# Patient Record
Sex: MALE | Race: WHITE | Employment: FULL TIME | URBAN - METROPOLITAN AREA
[De-identification: names, ages, dates, MRNs, and addresses within clinical notes are randomized per-mention and may not be internally consistent; named-entity substitution may affect disease eponyms.]

---

## 2017-01-17 ENCOUNTER — OFFICE VISIT (OUTPATIENT)
Dept: INTERNAL MEDICINE | Facility: CLINIC | Age: 52
End: 2017-01-17
Payer: COMMERCIAL

## 2017-01-17 VITALS
SYSTOLIC BLOOD PRESSURE: 134 MMHG | BODY MASS INDEX: 35.29 KG/M2 | OXYGEN SATURATION: 95 % | TEMPERATURE: 98.1 F | HEIGHT: 73 IN | DIASTOLIC BLOOD PRESSURE: 100 MMHG | WEIGHT: 266.3 LBS | HEART RATE: 60 BPM

## 2017-01-17 DIAGNOSIS — G43.009 MIGRAINE WITHOUT AURA AND WITHOUT STATUS MIGRAINOSUS, NOT INTRACTABLE: Primary | ICD-10-CM

## 2017-01-17 DIAGNOSIS — N52.9 ERECTILE DYSFUNCTION, UNSPECIFIED ERECTILE DYSFUNCTION TYPE: ICD-10-CM

## 2017-01-17 PROCEDURE — 99213 OFFICE O/P EST LOW 20 MIN: CPT | Performed by: INTERNAL MEDICINE

## 2017-01-17 RX ORDER — ONDANSETRON 4 MG/1
4-8 TABLET, ORALLY DISINTEGRATING ORAL EVERY 8 HOURS PRN
Qty: 20 TABLET | Refills: 1 | Status: SHIPPED | OUTPATIENT
Start: 2017-01-17 | End: 2017-02-14

## 2017-01-17 RX ORDER — SILDENAFIL CITRATE 20 MG/1
20-40 TABLET ORAL DAILY PRN
Qty: 30 TABLET | Refills: 0 | Status: SHIPPED | OUTPATIENT
Start: 2017-01-17 | End: 2017-02-14

## 2017-01-17 RX ORDER — PREDNISONE 20 MG/1
40 TABLET ORAL 2 TIMES DAILY
Qty: 12 TABLET | Refills: 0 | Status: SHIPPED | OUTPATIENT
Start: 2017-01-17 | End: 2017-01-20

## 2017-01-17 RX ORDER — SUMATRIPTAN 100 MG/1
100 TABLET, FILM COATED ORAL
Qty: 9 TABLET | Refills: 2 | Status: SHIPPED
Start: 2017-01-17 | End: 2017-02-14

## 2017-01-17 NOTE — MR AVS SNAPSHOT
After Visit Summary   1/17/2017    Ricki Blanchard    MRN: [id number]           Patient Information     Date Of Birth          1965        Visit Information        Provider Department      1/17/2017 1:00 PM Ibrahima Campbell MD [facility]        Today's Diagnoses     Migraine without aura and without status migrainosus, not intractable    -  1     Erectile dysfunction, unspecified erectile dysfunction type           Care Instructions    *  Your headaches appear to be migraine headaches.        *  Take Imitrex 100mg at the onset of migraine specific headache, or even at the time of the aura that precedes the migraine if you have a specific aura that precedes your migraine headache.  You may take a second dose every after 3 hours if you do not get noticeable migraine relief up to a maximum dose of 200 mg per day.      *  Possible side effects of Imitrex include, but are not limited to: chest pain, palpitations, shortness of breath, chest tightness, anxiety, sweating, dizziness.   Stop the medication if you develop any concerning side effects.      *  Prednisone 60 mg once per day for 3 days.     *  ZOfran tablet, 4 mg every 6-8 hours as needed for nasuea.       *  Try to find a quiet, darker room to rest during the migraine headache.  Cover your eyes with a washcloth if needed.      *  You may experience nausea and/or vomiting during the migraines.  Try to remain as well hydrated as you can and eat what you are capable.     *  It is typical to have lingering migraine symptoms for a day or two after a migraine which may also include fatigue and difficulties in concentration.      *  There is also a possibility of another rebound migraine during the days following a migraine episode.  Use the migraine medication mentioned above.     *  If you have more than one or two migraines per month on a regular basis, please return to see me to discuss preventative migraine  medications.        Common Migraine triggers:    * sudden changes in the weather, excessive heat and humidity  * Some fragrances common to outdoor living, including sunscreens and insect repellents.  * Foods containing nitrites, such as hot dogs, sausages, luncheon meats  *  Certain cheeses such as Cheddar, Brie, Blue cheeses.    Some cheeses are OK such as cottage cheeses, American cheese, and cream cheese.  *  Caffeine and alcohol can be triggers, limit iced tea, coffee and cola drinks to 16 ounces per day and alcoholic drinks to 1-2 per day.  *  Avoid choclate and pizza.  *  Have no more than 1/2 cup per day of citrus, raisins, papayas or avocados      VIAGRA (SILDENAFIL):     *  Trial of Sildenafil (Viagra) for help with erections.       **DO NOT TAKE VIAGRA/SILDENAFIL WITHIN ONE WEEK OF A MIGRAINE HEADACHE    *  Brand name Viagra is Sildenafil that comes in a 25, 50, or 100 mg size tablet that is manufactured and marketed by the drug company [employer].    *  Sildenafil is also available as a generic medication that comes in a 20 or 40 mg tablet.  It is not technically called Viagra, but is the same medication.      *  Visit the Viagra web site (http://www.Artabase/) for more information and also for possible discount cards.      *  Viagra and generic Sildenafil are potentially available at much cheaper prices from some [facility], which can be very important if you are paying for the cost of this medication.  One example is www.Ringpay.Bizily    *  Depending on which version of Sildenafil you have, Take a 20 mg, 40 mg or 50 mg dose (1/2 of 100 mg tablet) 30-60 minutes before sexual intercourse.  If the first time lower dose does not work, then retry the same dose the next time.  If that dose dose not work, then try 100 mg dose.    The medication rarely works the very first time you try it since you are likely to be nervous about the medication itself, but there has to be a first time.      *  Beware  of possible side effects including dizziness, headaches, colosr vision, upset stomach, nausea, passing out, problems with urination and sustained erections.  Never take the medication with nitroglycerin containing medications.  If you experience any severe side effects stop the medication and do not take it again until you speak with our clinic.     *  There may be an increased rate of side effects from Sildenafil/Viagra when alcohol is used too close to viagra    *  I will prescribe either Sildenafil or Viagra as long as it helps improve erectile dysfunction and does not cause side effects.  Contact me after you finish the first prescription.  If it works well with no major side effects, then I will give you further prescriptions.  If Sildenafil does not work as desired, then we may consider another similar medication (Levitra or Cialis)    *  Contact me after you use the first prescripton and let me know if it works and that there are no side effects.   If it works and there are no side effects, then I will continue it.  Also let me know what form of Sildenafil you would like to continue and where you would like to get the prescriptions.                Follow-ups after your visit        Who to contact     If you have questions or need follow up information about today's clinic visit or your schedule please contact [facility] directly at [phone].  Normal or non-critical lab and imaging results will be communicated to you by MyChart, letter or phone within 4 business days after the clinic has received the results. If you do not hear from us within 7 days, please contact the clinic through [employer] or phone. If you have a critical or abnormal lab result, we will notify you by phone as soon as possible.  Submit refill requests through [employer] or call your pharmacy and they will forward the refill request to us. Please allow 3 business days for your refill to be completed.           "Additional Information About Your Visit        [employer] Information     Your.MD gives you secure access to your electronic health record. If you see a primary care provider, you can also send messages to your care team and make appointments. If you have questions, please call your primary care clinic.  If you do not have a primary care provider, please call [phone] and they will assist you.        Care EveryWhere ID     This is your Care EveryWhere ID. This could be used by other organizations to access your [place] medical records  [id number]        Your Vitals Were     Pulse Temperature Height BMI (Body Mass Index) Pulse Oximetry       60 98.1  F (36.7  C) (Oral) 6' 1\" (1.854 m) 35.14 kg/m2 95%        Blood Pressure from Last 3 Encounters:   01/17/17 134/100   10/12/16 158/102   02/01/16 132/94    Weight from Last 3 Encounters:   01/17/17 266 lb 4.8 oz (120.793 kg)   10/12/16 260 lb 9.6 oz (118.207 kg)   02/01/16 257 lb 3.2 oz (116.665 kg)              Today, you had the following     No orders found for display         Today's Medication Changes          These changes are accurate as of: 1/17/17  1:42 PM.  If you have any questions, ask your nurse or doctor.               Start taking these medicines.        Dose/Directions    ondansetron 4 MG ODT tab   Commonly known as:  ZOFRAN ODT   Used for:  Migraine without aura and without status migrainosus, not intractable   Started by:  Ibrahima Campbell MD        Dose:  4-8 mg   Take 1-2 tablets (4-8 mg) by mouth every 8 hours as needed for nausea   Quantity:  20 tablet   Refills:  1       predniSONE 20 MG tablet   Commonly known as:  DELTASONE   Used for:  Migraine without aura and without status migrainosus, not intractable   Started by:  Ibrahima Campbell MD        Dose:  40 mg   Take 2 tablets (40 mg) by mouth 2 times daily for 3 days   Quantity:  12 tablet   Refills:  0       sildenafil 20 MG tablet   Commonly known as:  REVATIO/VIAGRA   Used " for:  Erectile dysfunction, unspecified erectile dysfunction type   Started by:  Ibrahima Campbell MD        Dose:  20-40 mg   Take 1-2 tablets (20-40 mg) by mouth daily as needed Never use with nitroglycerin, terazosin or doxazosin.   Quantity:  30 tablet   Refills:  0       SUMAtriptan 100 MG tablet   Commonly known as:  IMITREX   Used for:  Migraine without aura and without status migrainosus, not intractable   Started by:  Ibrahima Campbell MD        Dose:  100 mg   Take 1 tablet (100 mg) by mouth at onset of headache for migraine May take one additional tablet after hours if needed; MAX dose 200 mg daily   Quantity:  9 tablet   Refills:  2         Stop taking these medicines if you haven't already. Please contact your care team if you have questions.     sildenafil 100 MG cap/tab   Commonly known as:  VIAGRA   Stopped by:  Ibrahima Campbell MD           tadalafil 20 MG tablet   Commonly known as:  CIALIS   Stopped by:  Ibrahima Campbell MD                Where to get your medicines      These medications were sent to [facility]/pharmacy #[id number] - [address] [address]     Phone:  [phone]    - ondansetron 4 MG ODT tab  - predniSONE 20 MG tablet  - SUMAtriptan 100 MG tablet      Some of these will need a paper prescription and others can be bought over the counter.  Ask your nurse if you have questions.     Bring a paper prescription for each of these medications    - sildenafil 20 MG tablet             Primary Care Provider Office Phone # Fax #    Ibrahima Campbell [phone]       [facility]3] [address] [facility] [address]        Thank you!     Thank you for choosing [facility]  for your care. Our goal is always to provide you with excellent care. Hearing back from our patients is one way we can continue to improve our services. Please take a few minutes to complete  the written survey that you may receive in the mail after your visit with us. Thank you!             Your Updated Medication List - Protect others around you: Learn how to safely use, store and throw away your medicines at www.disposemymeds.org.          This list is accurate as of: 1/17/17  1:42 PM.  Always use your most recent med list.                   Brand Name Dispense Instructions for use    acetaminophen 500 MG tablet    TYLENOL     Take 3 tablets by mouth every 6 hours as needed.       CLARITIN 10 MG tablet   Generic drug:  loratadine      Take 1 tablet (10 mg) by mouth daily       guaiFENesin-codeine 100-10 MG/5ML Soln solution    ROBITUSSIN AC    120 mL    Take 5 mLs by mouth every 4 hours as needed for cough       ibuprofen 800 MG tablet    ADVIL/MOTRIN    90 tablet    Take 1 tablet by mouth every 8 hours as needed for pain.       ondansetron 4 MG ODT tab    ZOFRAN ODT    20 tablet    Take 1-2 tablets (4-8 mg) by mouth every 8 hours as needed for nausea       predniSONE 20 MG tablet    DELTASONE    12 tablet    Take 2 tablets (40 mg) by mouth 2 times daily for 3 days       sildenafil 20 MG tablet    REVATIO/VIAGRA    30 tablet    Take 1-2 tablets (20-40 mg) by mouth daily as needed Never use with nitroglycerin, terazosin or doxazosin.       SUMAtriptan 100 MG tablet    IMITREX    9 tablet    Take 1 tablet (100 mg) by mouth at onset of headache for migraine May take one additional tablet after hours if needed; MAX dose 200 mg daily

## 2017-01-17 NOTE — PROGRESS NOTES
SUBJECTIVE:                                                    Ricki Blanchard is a 51 year old male who presents to clinic today for the following health issues:      Headache     Onset: x5days     Description:   Location: unilateral in the left frontal area   Character: throbbing pain  Frequency:  constant  Duration:  Been steady all five days    Intensity: severe    Progression of Symptoms:  worsening    Accompanying Signs & Symptoms:  Stiff neck: no  Neck or upper back pain: no   Fever: no   Sinus pressure: no   Nausea or vomiting: YES- Today was the first day of vomitting  Dizziness: YES- a little bit  Numbness: no   Weakness: YES  Visual changes: YES- seeing spots   History:   Head trauma: no   Family history of migraines: YES- mother and grand father  Previous tests for headaches: YES- many  Neurologist evaluations: no   Able to do daily activities: YES- today its bothering  Wake with a headaches: YES  Do headaches wake you up: YES  Daily pain medication use: YES- tylenol/advil  Work/school stressors/changes: no    Precipitating factors:   Does light make it worse: YES  Does sound make it worse: YES    Alleviating factors:  Does sleep help: no     head injury migraines, but has not had one for years.  He says that he has not tried imitrex or other triptan in past.      Therapies Tried and outcome: Ibuprofen (Advil, Motrin) and Tylenol      2.  Pt. reports difficulty achieving and maintaining erections.  Denies other specific  complaints.  Has not tried VIAGRA or similar medication.  Is interested in trying VIAGRA.     Problem list and histories reviewed & adjusted, as indicated.  Additional history: as documented        Past Medical History:  ---------------------------  Past Medical History   Diagnosis Date     Migraine headaches 10/25/2010     Tobacco abuse 10/25/2010     Hyperlipidemia LDL goal <160 9/1/2011          Neck pain      Chronic pain      disc problems     Headache(784.0)      like  migraines     Obesity (BMI 30-39.9) 4/28/14     BMI 33.4     Prediabetes      A1C 6.5       Past Surgical History:  ---------------------------  Past Surgical History   Procedure Laterality Date     Replace disk cervical anterior  12/13/2011     Procedure:REPLACE DISK CERVICAL ANTERIOR; C5-6 ARTHROPLASTY (PRO DISC-C) (c-arm); Surgeon:ZINA JOSHI; Location:[facility] OR       Current Medications:  ---------------------------  Current Outpatient Prescriptions   Medication Sig Dispense Refill     SUMAtriptan (IMITREX) 100 MG tablet Take 1 tablet (100 mg) by mouth at onset of headache for migraine May take one additional tablet after hours if needed; MAX dose 200 mg daily 9 tablet 2     ondansetron (ZOFRAN ODT) 4 MG ODT tab Take 1-2 tablets (4-8 mg) by mouth every 8 hours as needed for nausea 20 tablet 1     sildenafil (REVATIO/VIAGRA) 20 MG tablet Take 1-2 tablets (20-40 mg) by mouth daily as needed Never use with nitroglycerin, terazosin or doxazosin. 30 tablet 0     guaiFENesin-codeine (ROBITUSSIN AC) 100-10 MG/5ML SOLN Take 5 mLs by mouth every 4 hours as needed for cough 120 mL 0     loratadine (CLARITIN) 10 MG tablet Take 1 tablet (10 mg) by mouth daily       ibuprofen (ADVIL,MOTRIN) 800 MG tablet Take 1 tablet by mouth every 8 hours as needed for pain. 90 tablet 1     acetaminophen (TYLENOL) 500 MG tablet Take 3 tablets by mouth every 6 hours as needed.         Allergies:  -------------  Allergies   Allergen Reactions     Seasonal Allergies Other (See Comments)     Sneezing, watery eyes       Social History:  -------------------  Social History     Social History     Marital Status:      Spouse Name: N/A     Number of Children: N/A     Years of Education: N/A     Occupational History     Not on file.     Social History Main Topics     Smoking status: Current Every Day Smoker -- 0.50 packs/day for 20 years     Types: Cigarettes     Last Attempt to Quit: 12/12/2011     Smokeless tobacco: Never Used       "Comment: 1/2-1 pk daily     Alcohol Use: No     Drug Use: No     Sexual Activity:     Partners: Female     Other Topics Concern     Not on file     Social History Narrative       Family Medical History:  ------------------------------  Family History   Problem Relation Age of Onset     Lipids Mother      Lipids Father          ROS:  REVIEW OF SYSTEMS:    RESP: negative for cough, dyspnea, wheezing, hemoptysis  CV: negative for chest pain, palpitations, PND, JACKSON, orthopnea; reports no changes in their ability to perform physical activity (from cardiovascular standpoint)  GI: negative for dysphagia, N/V, pain, melena, diarrhea and constipation  NEURO: negative for numbness/tingling, paralysis, incoordination, or focal weakness     OBJECTIVE:                                                    /100 mmHg  Pulse 60  Temp(Src) 98.1  F (36.7  C) (Oral)  Ht 6' 1\" (1.854 m)  Wt 266 lb 4.8 oz (120.793 kg)  BMI 35.14 kg/m2  SpO2 95%     GENERAL alert and no distress  EYES:  Normal sclera,conjunctiva, EOMI  HENT: oral and posterior pharynx without lesions or erythema, facies symmetric  NECK: Neck supple. No LAD, without thyroidmegaly or JVD., Carotids without bruits.  RESP: Clear to ausculation bilaterally without wheezes or crackles. Normal BS in all fields.  CV: RRR normal S1S2 without murmurs, rubs or gallops. PMI normal  LYMPH: no cervical lymph adenopathy appreciated  MS: extremities- no gross deformities of the visible extremities noted, no edema  PSYCH: Alert and oriented times 3; speech- coherent  SKIN:  No obvious significant skin lesions on visible portions of face          ASSESSMENT/PLAN:                                                      (G43.009) Migraine without aura and without status migrainosus, not intractable  (primary encounter diagnosis)  Comment: Discuss migraine heaches and vascular headache variants.  Discuss pathophysiology and treatment options inclduing OTC meds such as Tylenol or NSAIDS " with food, triptan medicaitons such as Imitrex, maxalt, etc.  Reviewed their indications, proper dosing instructions.  Also discussed their side effetcs including tachycardia, chest pain, elevated BP.  Discussed some of the contributing features to migraine and items to avoid such as excessive alcohol, blue cheese, red wine, etc.   Discussed administration of Imitrex at the onset of migraine specific headache with a second dose 2-3 hours later if no migraine relief with a max dose of 200 mg per day.    Discussed side effects of Imitrex including, but not limited to, chest pain, palpitations, shortness of breath, chest tightness, anxiety, sweating, dizziness.    Plan: SUMAtriptan (IMITREX) 100 MG tablet,         ondansetron (ZOFRAN ODT) 4 MG ODT tab,         predniSONE (DELTASONE) 20 MG tablet            (N52.9) Erectile dysfunction, unspecified erectile dysfunction type  Comment: Discussed erectile dysfunction in detail including a review of the physiology that produces erections.  Reviewed typical causes of impotence such as medication side effect, diabetes, neuropathies, drugs/alcohol, underlying mood disorder, relationship issues, neurogenic causes, hypogonadism.  Discussed typical medical evaluation including physical exam findings, labs (thyroid, testosterone, BMP, etc.)  Treatment will include modifying any causative features if able, stopping nay substance use, treating low testosterone levels, and consideration of medications (Viagra/Levitra/Cialis).     Discussed the risks and benefits of these meds.  Discussed the potential side effects of Viagra/Levitra/Cialis including dizziness, headache, vision changes, syncope, GI upset/dyspepsia, hypotension.  Recommended avoiding taking with large meal to improve absorption.  Avoid taking with alcohol.  Never take nitroglycerin containing compounds when on these medications.    WIll start with a lower dose and titrate upward as needed, aiming for the lowest  effective dose.  If effective, they will call for a prescription provided the medication if effective and there are no side effects.    Plan: sildenafil (REVATIO/VIAGRA) 20 MG tablet              See Patient Instructions    PAMELA FONTENOT M.D., MD  [facility]

## 2017-01-17 NOTE — NURSING NOTE
"Chief Complaint   Patient presents with     Headache     Migraine, vomitting, blackout x5 days       Initial /100 mmHg  Pulse 60  Temp(Src) 98.1  F (36.7  C) (Oral)  Ht 6' 1\" (1.854 m)  Wt 266 lb 4.8 oz (120.793 kg)  BMI 35.14 kg/m2  SpO2 95% Estimated body mass index is 35.14 kg/(m^2) as calculated from the following:    Height as of this encounter: 6' 1\" (1.854 m).    Weight as of this encounter: 266 lb 4.8 oz (120.793 kg).  BP completed using cuff size: regular    "

## 2017-01-17 NOTE — PATIENT INSTRUCTIONS
*  Your headaches appear to be migraine headaches.        *  Take Imitrex 100mg at the onset of migraine specific headache, or even at the time of the aura that precedes the migraine if you have a specific aura that precedes your migraine headache.  You may take a second dose every after 3 hours if you do not get noticeable migraine relief up to a maximum dose of 200 mg per day.      *  Possible side effects of Imitrex include, but are not limited to: chest pain, palpitations, shortness of breath, chest tightness, anxiety, sweating, dizziness.   Stop the medication if you develop any concerning side effects.      *  Prednisone 60 mg once per day for 3 days.     *  ZOfran tablet, 4 mg every 6-8 hours as needed for nasuea.       *  Try to find a quiet, darker room to rest during the migraine headache.  Cover your eyes with a washcloth if needed.      *  You may experience nausea and/or vomiting during the migraines.  Try to remain as well hydrated as you can and eat what you are capable.     *  It is typical to have lingering migraine symptoms for a day or two after a migraine which may also include fatigue and difficulties in concentration.      *  There is also a possibility of another rebound migraine during the days following a migraine episode.  Use the migraine medication mentioned above.     *  If you have more than one or two migraines per month on a regular basis, please return to see me to discuss preventative migraine medications.        Common Migraine triggers:    * sudden changes in the weather, excessive heat and humidity  * Some fragrances common to outdoor living, including sunscreens and insect repellents.  * Foods containing nitrites, such as hot dogs, sausages, luncheon meats  *  Certain cheeses such as Cheddar, Brie, Blue cheeses.    Some cheeses are OK such as cottage cheeses, American cheese, and cream cheese.  *  Caffeine and alcohol can be triggers, limit iced tea, coffee and cola drinks to 16  ounces per day and alcoholic drinks to 1-2 per day.  *  Avoid choclate and pizza.  *  Have no more than 1/2 cup per day of citrus, raisins, papayas or avocados      VIAGRA (SILDENAFIL):     *  Trial of Sildenafil (Viagra) for help with erections.       **DO NOT TAKE VIAGRA/SILDENAFIL WITHIN ONE WEEK OF A MIGRAINE HEADACHE    *  Brand name Viagra is Sildenafil that comes in a 25, 50, or 100 mg size tablet that is manufactured and marketed by the drug company [employer].    *  Sildenafil is also available as a generic medication that comes in a 20 or 40 mg tablet.  It is not technically called Viagra, but is the same medication.      *  Visit the Viagra web site (http://www.Proven/) for more information and also for possible discount cards.      *  Viagra and generic Sildenafil are potentially available at much cheaper prices from some [facility], which can be very important if you are paying for the cost of this medication.  One example is www.sifonr    *  Depending on which version of Sildenafil you have, Take a 20 mg, 40 mg or 50 mg dose (1/2 of 100 mg tablet) 30-60 minutes before sexual intercourse.  If the first time lower dose does not work, then retry the same dose the next time.  If that dose dose not work, then try 100 mg dose.    The medication rarely works the very first time you try it since you are likely to be nervous about the medication itself, but there has to be a first time.      *  Beware of possible side effects including dizziness, headaches, colosr vision, upset stomach, nausea, passing out, problems with urination and sustained erections.  Never take the medication with nitroglycerin containing medications.  If you experience any severe side effects stop the medication and do not take it again until you speak with our clinic.     *  There may be an increased rate of side effects from Sildenafil/Viagra when alcohol is used too close to viagra    *  I will prescribe either  Sildenafil or Viagra as long as it helps improve erectile dysfunction and does not cause side effects.  Contact me after you finish the first prescription.  If it works well with no major side effects, then I will give you further prescriptions.  If Sildenafil does not work as desired, then we may consider another similar medication (Levitra or Cialis)    *  Contact me after you use the first prescripton and let me know if it works and that there are no side effects.   If it works and there are no side effects, then I will continue it.  Also let me know what form of Sildenafil you would like to continue and where you would like to get the prescriptions.

## 2017-02-14 ENCOUNTER — OFFICE VISIT (OUTPATIENT)
Dept: FAMILY MEDICINE | Facility: CLINIC | Age: 52
End: 2017-02-14
Payer: COMMERCIAL

## 2017-02-14 VITALS — SYSTOLIC BLOOD PRESSURE: 130 MMHG | DIASTOLIC BLOOD PRESSURE: 82 MMHG | HEIGHT: 73 IN | TEMPERATURE: 98.2 F

## 2017-02-14 DIAGNOSIS — Z71.84 COUNSELING ABOUT TRAVEL: ICD-10-CM

## 2017-02-14 DIAGNOSIS — Z23 VACCINE FOR SINGLE BACTERIAL DISEASE: Primary | ICD-10-CM

## 2017-02-14 PROCEDURE — 90472 IMMUNIZATION ADMIN EACH ADD: CPT | Mod: GA | Performed by: FAMILY MEDICINE

## 2017-02-14 PROCEDURE — 90471 IMMUNIZATION ADMIN: CPT | Mod: GA | Performed by: FAMILY MEDICINE

## 2017-02-14 PROCEDURE — 90691 TYPHOID VACCINE IM: CPT | Mod: GA | Performed by: FAMILY MEDICINE

## 2017-02-14 PROCEDURE — 99402 PREV MED CNSL INDIV APPRX 30: CPT | Mod: 25 | Performed by: FAMILY MEDICINE

## 2017-02-14 PROCEDURE — 90632 HEPA VACCINE ADULT IM: CPT | Mod: GA | Performed by: FAMILY MEDICINE

## 2017-02-14 RX ORDER — ZOLPIDEM TARTRATE 10 MG/1
10 TABLET ORAL
Qty: 14 TABLET | Refills: 0 | Status: SHIPPED | OUTPATIENT
Start: 2017-02-14 | End: 2017-07-27

## 2017-02-14 RX ORDER — AZITHROMYCIN 500 MG/1
TABLET, FILM COATED ORAL
Qty: 3 TABLET | Refills: 0 | Status: SHIPPED | OUTPATIENT
Start: 2017-02-14 | End: 2017-07-27

## 2017-02-14 NOTE — PATIENT INSTRUCTIONS
Today February 14, 2017 you received the    Hepatitis A Vaccine - Please return on 8/13/17 or later for your 2nd and final dose.    Typhoid - injectable. This vaccine is valid for two years.   .    These appointments can be made as a NURSE ONLY visit.    **It is very important for the vaccinations to be given on the scheduled day(s), this helps ensure you receive the full effectiveness of the vaccine.**    Please call [facility] with any questions [phone]    Thank you for visiting [facility]

## 2017-02-14 NOTE — PROGRESS NOTES
"  Itinerary:  [facility]    Departure Date: 02/27/17    Return Date: 03/11/17    Length of Trip: 12 days    Purpose of Trip: business, pleasure    Urban/Rural: urban    Accommodations: hotel    IMMUNIZATION HISTORY  Have you received any immunizations within the past 4 weeks?  No  Have you ever fainted from having your blood drawn or from an injection?  No  Have you ever had a fever reaction to vaccination?  No  Have you ever had any bad reaction or side effect from any vaccination?  No  Have you ever had hepatitis A or B vaccine?  No  Do you live (or work closely) with anyone who has AIDS, an AIDS-like condition, any other immune disorder or who is on chemotherapy for cancer or a   family history of immunodeficiency?  No  Have you received any injection of immune globulin or any blood products during the past 12 months?  No    Patient roomed by Flavia Ramesh M.A.      Special medical concerns: none  Sleep issues - requesting med to help with sleep    /82  Temp 98.2  F (36.8  C) (Oral)  Ht 6' 1\" (1.854 m)  EXAM: deferred    Immunizations discussed include: Hepatitis A and Typhoid  Malaraia prophylaxis recommended: NONE needed based on trip details  Symptomatic treatment for traveler's diarrhea: azithromycin    ASSESSMENT/PLAN:  1. Vaccine for single bacterial disease     - HEPATITIS A VACCINE (ADULT)  - TYPHOID VACCINE, IM    2. Counseling about travel     - azithromycin (ZITHROMAX) 500 MG tablet; Take one tablet daily for up to 3 days as needed for traveler's diarrhea  Dispense: 3 tablet; Refill: 0  - zolpidem (AMBIEN) 10 MG tablet; Take 1 tablet (10 mg) by mouth nightly as needed for sleep  Dispense: 14 tablet; Refill: 0  I have reviewed general recommendations for safe travel   including: food/water precautions, insect avoidance,   roadway safety. Educational materials and [employer] report provided.    Total visit time 60 minutes (two family members)with over 50% of time spent counseling patient.    "

## 2017-02-14 NOTE — MR AVS SNAPSHOT
After Visit Summary   2/14/2017    Ricki Blanchard    MRN: [id number]           Patient Information     Date Of Birth          1965        Visit Information        Provider Department      2/14/2017 10:30 AM Laura Chilel, DO [facility]        Today's Diagnoses     Vaccine for single bacterial disease    -  1    Counseling about travel          Care Instructions    Today February 14, 2017 you received the    Hepatitis A Vaccine - Please return on 8/13/17 or later for your 2nd and final dose.    Typhoid - injectable. This vaccine is valid for two years.   .    These appointments can be made as a NURSE ONLY visit.    **It is very important for the vaccinations to be given on the scheduled day(s), this helps ensure you receive the full effectiveness of the vaccine.**    Please call [facility] with any questions [phone]    Thank you for visiting [facility]            Follow-ups after your visit        Who to contact     If you have questions or need follow up information about [facility]'s clinic visit or your schedule please contact [facility] directly at [phone].  Normal or non-critical lab and imaging results will be communicated to you by Zangohart, letter or phone within 4 business days after the clinic has received the results. If you do not hear from us within 7 days, please contact the clinic through [employer] or phone. If you have a critical or abnormal lab result, we will notify you by phone as soon as possible.  Submit refill requests through Azubu or call your pharmacy and they will forward the refill request to us. Please allow 3 business days for your refill to be completed.          Additional Information About Your Visit        Zangohart Information     Azubu gives you secure access to your electronic health record. If you see a primary care provider, you can also send messages to your care team and make  "appointments. If you have questions, please call your primary care clinic.  If you do not have a primary care provider, please call [phone] and they will assist you.        Care EveryWhere ID     This is your Care EveryWhere ID. This could be used by other organizations to access your [facility] medical records  [id number]        Your Vitals Were     Temperature Height                98.2  F (36.8  C) (Oral) 6' 1\" (1.854 m)           Blood Pressure from Last 3 Encounters:   02/14/17 130/82   01/17/17 (!) 134/100   10/12/16 (!) 158/102    Weight from Last 3 Encounters:   01/17/17 266 lb 4.8 oz (120.8 kg)   10/12/16 260 lb 9.6 oz (118.2 kg)   02/01/16 257 lb 3.2 oz (116.7 kg)              We Performed the Following     HEPATITIS A VACCINE (ADULT)     TYPHOID VACCINE, IM          Today's Medication Changes          These changes are accurate as of: 2/14/17 10:48 AM.  If you have any questions, ask your nurse or doctor.               Start taking these medicines.        Dose/Directions    azithromycin 500 MG tablet   Commonly known as:  ZITHROMAX   Used for:  Counseling about travel   Started by:  Laura Chilel DO        Take one tablet daily for up to 3 days as needed for traveler's diarrhea   Quantity:  3 tablet   Refills:  0       zolpidem 10 MG tablet   Commonly known as:  AMBIEN   Used for:  Counseling about travel   Started by:  Laura Chilel DO        Dose:  10 mg   Take 1 tablet (10 mg) by mouth nightly as needed for sleep   Quantity:  14 tablet   Refills:  0            Where to get your medicines      These medications were sent to [id number] - [place], [place] - [address] [place] [address]  [address] [facility] [address]     Phone:  [phone]     azithromycin 500 MG tablet         Some of these will need a paper prescription and others can be bought over the counter.  Ask your nurse if you have questions.     Bring a paper prescription for each of these medications     zolpidem 10 MG " tablet                Primary Care Provider Office Phone # Fax #    Ibrahima Campbell [phone]       [facility] [address] [facility] [address]        Thank you!     Thank you for choosing [facility] [place]  for your care. Our goal is always to provide you with excellent care. Hearing back from our patients is one way we can continue to improve our services. Please take a few minutes to complete the written survey that you may receive in the mail after your visit with us. Thank you!             Your Updated Medication List - Protect others around you: Learn how to safely use, store and throw away your medicines at www.disposemymeds.org.          This list is accurate as of: 2/14/17 10:48 AM.  Always use your most recent med list.                   Brand Name Dispense Instructions for use    azithromycin 500 MG tablet    ZITHROMAX    3 tablet    Take one tablet daily for up to 3 days as needed for traveler's diarrhea       zolpidem 10 MG tablet    AMBIEN    14 tablet    Take 1 tablet (10 mg) by mouth nightly as needed for sleep

## 2017-02-14 NOTE — NURSING NOTE
"Chief Complaint   Patient presents with     Travel Clinic     [facility]     /82  Temp 98.2  F (36.8  C) (Oral)  Ht 6' 1\" (1.854 m) Estimated body mass index is 35.13 kg/(m^2) as calculated from the following:    Height as of 1/17/17: 6' 1\" (1.854 m).    Weight as of 1/17/17: 266 lb 4.8 oz (120.8 kg).  bp completed using cuff size: large      Health Maintenance that is potentially due pending provider review:  NONE    n/a  Flavia Ramesh M.A.    "

## 2017-06-19 ENCOUNTER — TRANSFERRED RECORDS (OUTPATIENT)
Dept: HEALTH INFORMATION MANAGEMENT | Facility: CLINIC | Age: 52
End: 2017-06-19

## 2017-07-25 ENCOUNTER — APPOINTMENT (OUTPATIENT)
Dept: MRI IMAGING | Facility: CLINIC | Age: 52
End: 2017-07-25
Attending: EMERGENCY MEDICINE
Payer: OTHER MISCELLANEOUS

## 2017-07-25 ENCOUNTER — HOSPITAL ENCOUNTER (EMERGENCY)
Facility: CLINIC | Age: 52
Discharge: HOME | End: 2017-07-25
Attending: EMERGENCY MEDICINE | Admitting: EMERGENCY MEDICINE
Payer: OTHER MISCELLANEOUS

## 2017-07-25 ENCOUNTER — APPOINTMENT (OUTPATIENT)
Dept: CT IMAGING | Facility: CLINIC | Age: 52
End: 2017-07-25
Attending: EMERGENCY MEDICINE
Payer: OTHER MISCELLANEOUS

## 2017-07-25 VITALS
OXYGEN SATURATION: 98 % | HEIGHT: 74 IN | DIASTOLIC BLOOD PRESSURE: 87 MMHG | TEMPERATURE: 98.3 F | WEIGHT: 250 LBS | BODY MASS INDEX: 32.08 KG/M2 | RESPIRATION RATE: 39 BRPM | SYSTOLIC BLOOD PRESSURE: 144 MMHG
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
## 2017-07-25 DIAGNOSIS — R55 VASOVAGAL SYNCOPE: ICD-10-CM

## 2017-07-25 DIAGNOSIS — S16.1XXA CERVICAL STRAIN, INITIAL ENCOUNTER: ICD-10-CM

## 2017-07-25 LAB
ALBUMIN UR-MCNC: NEGATIVE MG/DL
ANION GAP SERPL CALCULATED.3IONS-SCNC: 11 MMOL/L (ref 3–14)
APPEARANCE UR: CLEAR
BASOPHILS # BLD AUTO: 0 10E9/L (ref 0–0.2)
BASOPHILS NFR BLD AUTO: 0.5 %
BILIRUB UR QL STRIP: NEGATIVE
BUN SERPL-MCNC: 10 MG/DL (ref 7–30)
CALCIUM SERPL-MCNC: 8.6 MG/DL (ref 8.5–10.1)
CHLORIDE SERPL-SCNC: 102 MMOL/L (ref 94–109)
CO2 SERPL-SCNC: 23 MMOL/L (ref 20–32)
COLOR UR AUTO: NORMAL
CREAT SERPL-MCNC: 0.89 MG/DL (ref 0.66–1.25)
DIFFERENTIAL METHOD BLD: NORMAL
EOSINOPHIL # BLD AUTO: 0.1 10E9/L (ref 0–0.7)
EOSINOPHIL NFR BLD AUTO: 1.7 %
ERYTHROCYTE [DISTWIDTH] IN BLOOD BY AUTOMATED COUNT: 12.7 % (ref 10–15)
GFR SERPL CREATININE-BSD FRML MDRD: 89 ML/MIN/1.7M2
GLUCOSE SERPL-MCNC: 131 MG/DL (ref 70–99)
GLUCOSE UR STRIP-MCNC: NEGATIVE MG/DL
HCT VFR BLD AUTO: 46.4 % (ref 40–53)
HGB BLD-MCNC: 16.8 G/DL (ref 13.3–17.7)
HGB UR QL STRIP: NEGATIVE
IMM GRANULOCYTES # BLD: 0 10E9/L (ref 0–0.4)
IMM GRANULOCYTES NFR BLD: 0.4 %
INTERPRETATION ECG - MUSE: NORMAL
KETONES UR STRIP-MCNC: NEGATIVE MG/DL
LEUKOCYTE ESTERASE UR QL STRIP: NEGATIVE
LYMPHOCYTES # BLD AUTO: 1.9 10E9/L (ref 0.8–5.3)
LYMPHOCYTES NFR BLD AUTO: 22.6 %
MCH RBC QN AUTO: 30.7 PG (ref 26.5–33)
MCHC RBC AUTO-ENTMCNC: 36.2 G/DL (ref 31.5–36.5)
MCV RBC AUTO: 85 FL (ref 78–100)
MONOCYTES # BLD AUTO: 0.5 10E9/L (ref 0–1.3)
MONOCYTES NFR BLD AUTO: 6.1 %
NEUTROPHILS # BLD AUTO: 5.8 10E9/L (ref 1.6–8.3)
NEUTROPHILS NFR BLD AUTO: 68.7 %
NITRATE UR QL: NEGATIVE
NRBC # BLD AUTO: 0 10*3/UL
NRBC BLD AUTO-RTO: 0 /100
PH UR STRIP: 7 PH (ref 5–7)
PLATELET # BLD AUTO: 206 10E9/L (ref 150–450)
POTASSIUM SERPL-SCNC: 4 MMOL/L (ref 3.4–5.3)
RBC # BLD AUTO: 5.47 10E12/L (ref 4.4–5.9)
RBC #/AREA URNS AUTO: 0 /HPF (ref 0–2)
SODIUM SERPL-SCNC: 136 MMOL/L (ref 133–144)
SP GR UR STRIP: 1 (ref 1–1.03)
SQUAMOUS #/AREA URNS AUTO: <1 /HPF (ref 0–1)
TROPONIN I SERPL-MCNC: NORMAL UG/L (ref 0–0.04)
URN SPEC COLLECT METH UR: NORMAL
UROBILINOGEN UR STRIP-MCNC: NORMAL MG/DL (ref 0–2)
WBC # BLD AUTO: 8.4 10E9/L (ref 4–11)
WBC #/AREA URNS AUTO: 1 /HPF (ref 0–2)

## 2017-07-25 PROCEDURE — 25000128 H RX IP 250 OP 636: Performed by: EMERGENCY MEDICINE

## 2017-07-25 PROCEDURE — 70553 MRI BRAIN STEM W/O & W/DYE: CPT

## 2017-07-25 PROCEDURE — 70544 MR ANGIOGRAPHY HEAD W/O DYE: CPT | Mod: XS

## 2017-07-25 PROCEDURE — 25000132 ZZH RX MED GY IP 250 OP 250 PS 637: Performed by: EMERGENCY MEDICINE

## 2017-07-25 PROCEDURE — 99285 EMERGENCY DEPT VISIT HI MDM: CPT | Mod: 25

## 2017-07-25 PROCEDURE — 81001 URINALYSIS AUTO W/SCOPE: CPT | Performed by: EMERGENCY MEDICINE

## 2017-07-25 PROCEDURE — 96361 HYDRATE IV INFUSION ADD-ON: CPT

## 2017-07-25 PROCEDURE — 93005 ELECTROCARDIOGRAM TRACING: CPT

## 2017-07-25 PROCEDURE — 70450 CT HEAD/BRAIN W/O DYE: CPT

## 2017-07-25 PROCEDURE — 85025 COMPLETE CBC W/AUTO DIFF WBC: CPT | Performed by: EMERGENCY MEDICINE

## 2017-07-25 PROCEDURE — 80048 BASIC METABOLIC PNL TOTAL CA: CPT | Performed by: EMERGENCY MEDICINE

## 2017-07-25 PROCEDURE — 84484 ASSAY OF TROPONIN QUANT: CPT | Performed by: EMERGENCY MEDICINE

## 2017-07-25 PROCEDURE — 96360 HYDRATION IV INFUSION INIT: CPT | Mod: 59

## 2017-07-25 PROCEDURE — A9585 GADOBUTROL INJECTION: HCPCS | Performed by: EMERGENCY MEDICINE

## 2017-07-25 PROCEDURE — 72125 CT NECK SPINE W/O DYE: CPT

## 2017-07-25 RX ORDER — ACETAMINOPHEN 500 MG
1000 TABLET ORAL ONCE
Status: COMPLETED | OUTPATIENT
Start: 2017-07-25 | End: 2017-07-25

## 2017-07-25 RX ORDER — SODIUM CHLORIDE 9 MG/ML
1000 INJECTION, SOLUTION INTRAVENOUS CONTINUOUS
Status: DISCONTINUED | OUTPATIENT
Start: 2017-07-25 | End: 2017-07-25 | Stop reason: HOSPADM

## 2017-07-25 RX ORDER — GADOBUTROL 604.72 MG/ML
10 INJECTION INTRAVENOUS ONCE
Status: COMPLETED | OUTPATIENT
Start: 2017-07-25 | End: 2017-07-25

## 2017-07-25 RX ADMIN — SODIUM CHLORIDE 1000 ML: 9 INJECTION, SOLUTION INTRAVENOUS at 10:21

## 2017-07-25 RX ADMIN — ACETAMINOPHEN 1000 MG: 500 TABLET, FILM COATED ORAL at 11:16

## 2017-07-25 RX ADMIN — SODIUM CHLORIDE 1000 ML: 9 INJECTION, SOLUTION INTRAVENOUS at 12:41

## 2017-07-25 RX ADMIN — GADOBUTROL 10 ML: 604.72 INJECTION INTRAVENOUS at 14:46

## 2017-07-25 ASSESSMENT — ENCOUNTER SYMPTOMS
HEADACHES: 0
NECK PAIN: 1
APPETITE CHANGE: 0
DIAPHORESIS: 0
SHORTNESS OF BREATH: 0
ROS GI COMMENTS: NO BOWEL INCONTINENCE
NAUSEA: 0
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
## 2017-07-25 NOTE — ED AVS SNAPSHOT
Emergency Department    [address] [place] [address]    Phone:  [phone]    Fax:  [phone]                                       Ricki Blanchard   MRN: [id number]    Department:   Emergency Department   Date of Visit:  7/25/2017           After Visit Summary Signature Page     I have received my discharge instructions, and my questions have been answered. I have discussed any challenges I see with this plan with the nurse or doctor.    ..........................................................................................................................................  Patient/Patient Representative Signature      ..........................................................................................................................................  Patient Representative Print Name and Relationship to Patient    ..................................................               ................................................  Date                                            Time    ..........................................................................................................................................  Reviewed by Signature/Title    ...................................................              ..............................................  Date                                                            Time
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
## 2017-07-25 NOTE — DISCHARGE INSTRUCTIONS
Understanding Cervical Strain    There are 7 bones (vertebrae) in the neck that are part of the spine. These are called the cervical spine. Cervical strain is a medical term for neck pain. The neck has several layers of muscles. These are connected with tendons to the cervical spine and other bones. Neck pain is often the result of injury to these muscles and tendons.  Causes of cervical strain  Different types of stress on the neck can damage muscles and tendons (soft tissues) and cause cervical strain. Cervical tissues can be damaged by:    The neck being forced past its normal range of motion, such as in a car accident or sports injury    Constant, low-level stress, such as from poor posture or a poorly set-up workspace  Symptoms of cervical strain  These may include:    Neck pain or stiffness    Pain in the shoulders or upper back    Muscle spasms    Headache, often starting at the base of the neck    Irritability, difficulty concentrating, or sleeplessness  Treatment for cervical strain  This problem often gets better on its own. Treatments aim to reduce pain and inflammation and increase the range of motion of the neck. Possible treatments include:    Over-the-counter or prescription pain medicine. These help relieve pain and inflammation.    Stretching exercises to decrease neck stiffness.    Massage to decrease neck stiffness.    Cold or heat pack. These help reduce pain and swelling.  Call 911  Call emergency services right away if you have any of these:    Face drooping or numbness    Numbness or weakness, especially in the arms or on one side    Slurred speech or difficulty speaking    Blurred vision   When to call your healthcare provider  Call your healthcare provider right away if you have any of these:    Fever of 100.4 F (38 C) or higher, or as directed    Pain or stiffness that gets worse    Symptoms that don t get better, or get worse    Numbness, tingling, weakness or shooting pains into the  arms or legs    New symptoms  Date Last Reviewed: 3/10/2016    5143-5634 [employer]. [address] [place] [address], [place] [address]. All rights reserved. This information is not intended as a substitute for professional medical care. Always follow your healthcare professional's instructions.          Fainting: Vagal Reaction  Fainting (syncope) is a temporary loss of consciousness that is associated with a loss of postural tone. It s also called passing out. It occurs when blood flow to the brain is less than normal. Your healthcare provider believes that your fainting was because of a vagal reaction. This condition is not a sign of serious disease.  A vagal reaction is a response in your body that causes your pulse to slow down or the blood vessels to expand. This causes your blood pressure to fall. And this sends less blood to your brain if you are standing or sitting. That results in dizziness, near-fainting, or fainting. Lying down usually stops the reaction within 60 seconds.  This response can occur during sudden fear, severe pain, emotional stress, overexertion, overheating, hunger, nausea or vomiting, prolonged standing, or standing up after sitting or lying for a long time.  Home care  Follow these guidelines when caring for yourself at home:    Rest today. Go back to your normal activities as soon as you are feeling back to normal.    Stay hydrated and avoid skipping meals.    If you feel lightheaded or dizzy, lie down right away. Or sit with your head lowered between your knees.  Follow-up care  Follow up with your healthcare provider, or as advised.  When to seek medical advice  Call your healthcare provider right away if any of these occur:    Another fainting spell that s not explained by the common causes listed above    Pain in your chest, arm, neck, jaw, back, or abdomen    Shortness of breath    Severe headache or seizure    Your heart beats very rapidly, very slowly, or irregularly  (palpitations)  Date Last Reviewed: 12/1/2016 2000-2017 [employer]. [address] [place] [address], [place] [address]. All rights reserved. This information is not intended as a substitute for professional medical care. Always follow your healthcare professional's instructions.          Fainting: Vagal Reaction  Fainting (syncope) is a temporary loss of consciousness that is associated with a loss of postural tone. It s also called passing out. It occurs when blood flow to the brain is less than normal. Your healthcare provider believes that your fainting was because of a vagal reaction. This condition is not a sign of serious disease.  A vagal reaction is a response in your body that causes your pulse to slow down or the blood vessels to expand. This causes your blood pressure to fall. And this sends less blood to your brain if you are standing or sitting. That results in dizziness, near-fainting, or fainting. Lying down usually stops the reaction within 60 seconds.  This response can occur during sudden fear, severe pain, emotional stress, overexertion, overheating, hunger, nausea or vomiting, prolonged standing, or standing up after sitting or lying for a long time.  Home care  Follow these guidelines when caring for yourself at home:    Rest today. Go back to your normal activities as soon as you are feeling back to normal.    Stay hydrated and avoid skipping meals.    If you feel lightheaded or dizzy, lie down right away. Or sit with your head lowered between your knees.  Follow-up care  Follow up with your healthcare provider, or as advised.  When to seek medical advice  Call your healthcare provider right away if any of these occur:    Another fainting spell that s not explained by the common causes listed above    Pain in your chest, arm, neck, jaw, back, or abdomen    Shortness of breath    Severe headache or seizure    Your heart beats very rapidly, very slowly, or irregularly  (palpitations)  Date Last Reviewed: 12/1/2016 2000-2017 [email or web address]. [address] [place] [address], [place] [address]. All rights reserved. This information is not intended as a substitute for professional medical care. Always follow your healthcare professional's instructions.

## 2017-07-27 ENCOUNTER — OFFICE VISIT (OUTPATIENT)
Dept: INTERNAL MEDICINE | Facility: CLINIC | Age: 52
End: 2017-07-27
Payer: OTHER MISCELLANEOUS

## 2017-07-27 VITALS
OXYGEN SATURATION: 97 % | DIASTOLIC BLOOD PRESSURE: 90 MMHG | BODY MASS INDEX: 32.12 KG/M2 | TEMPERATURE: 98.5 F | WEIGHT: 250.2 LBS | SYSTOLIC BLOOD PRESSURE: 126 MMHG | HEART RATE: 54 BPM

## 2017-07-27 DIAGNOSIS — R55 SYNCOPE, UNSPECIFIED SYNCOPE TYPE: Primary | ICD-10-CM

## 2017-07-27 PROCEDURE — 99214 OFFICE O/P EST MOD 30 MIN: CPT | Performed by: INTERNAL MEDICINE

## 2017-07-27 NOTE — NURSING NOTE
"Chief Complaint   Patient presents with     Hospital F/U       Initial /90  Pulse 54  Temp 98.5  F (36.9  C) (Oral)  Wt 250 lb 3.2 oz (113.5 kg)  SpO2 97%  BMI 32.12 kg/m2 Estimated body mass index is 32.12 kg/(m^2) as calculated from the following:    Height as of 7/25/17: 6' 2\" (1.88 m).    Weight as of this encounter: 250 lb 3.2 oz (113.5 kg).  Medication Reconciliation: complete    "
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
## 2017-07-27 NOTE — MR AVS SNAPSHOT
After Visit Summary   7/27/2017    Ricki Blanchard    MRN: [id number]           Patient Information     Date Of Birth          1965        Visit Information        Provider Department      7/27/2017 8:00 AM Junior Prieto MD [facility]        Today's Diagnoses     Syncope, unspecified syncope type    -  1       Follow-ups after your visit        Future tests that were ordered for you today     Open Future Orders        Priority Expected Expires Ordered    Echocardiogram Complete Routine  7/27/2018 7/27/2017    Zio Patch Holter Routine  9/10/2017 7/27/2017            Who to contact     If you have questions or need follow up information about today's clinic visit or your schedule please contact [facility] directly at [phone].  Normal or non-critical lab and imaging results will be communicated to you by MyChart, letter or phone within 4 business days after the clinic has received the results. If you do not hear from us within 7 days, please contact the clinic through MyChart or phone. If you have a critical or abnormal lab result, we will notify you by phone as soon as possible.  Submit refill requests through [employer] or call your pharmacy and they will forward the refill request to us. Please allow 3 business days for your refill to be completed.          Additional Information About Your Visit        MyChart Information     [employer] gives you secure access to your electronic health record. If you see a primary care provider, you can also send messages to your care team and make appointments. If you have questions, please call your primary care clinic.  If you do not have a primary care provider, please call [phone] and they will assist you.        Care EveryWhere ID     This is your Care EveryWhere ID. This could be used by other organizations to access your [facility] medical records  [id number]        Your Vitals Were     Pulse  Temperature Pulse Oximetry BMI (Body Mass Index)          54 98.5  F (36.9  C) (Oral) 97% 32.12 kg/m2         Blood Pressure from Last 3 Encounters:   07/27/17 126/90   07/25/17 144/87   02/14/17 130/82    Weight from Last 3 Encounters:   07/27/17 250 lb 3.2 oz (113.5 kg)   07/25/17 250 lb (113.4 kg)   01/17/17 266 lb 4.8 oz (120.8 kg)               Primary Care Provider Office Phone # Fax #    Ibrahima Campbell [phone]       [facility] [address] [facility] [address]        Equal Access to Services     SHERI DUNCAN : Hadii donte balderramao Soedward, waaxda luqadaha, qaybta kaalmada adeegyada, mike parkinson. So [facility] [phone].    ATENCIÓN: Si habla español, tiene a mclean disposición servicios gratuitos de asistencia lingüística. Llame al [phone].    We comply with applicable federal civil rights laws and [place] laws. We do not discriminate on the basis of race, color, national origin, age, disability sex, sexual orientation or gender identity.            Thank you!     Thank you for choosing [facility]  for your care. Our goal is always to provide you with excellent care. Hearing back from our patients is one way we can continue to improve our services. Please take a few minutes to complete the written survey that you may receive in the mail after your visit with us. Thank you!             Your Updated Medication List - Protect others around you: Learn how to safely use, store and throw away your medicines at www.disposemymeds.org.      Notice  As of 7/27/2017  8:55 AM    You have not been prescribed any medications.

## 2017-07-31 ENCOUNTER — HOSPITAL ENCOUNTER (OUTPATIENT)
Dept: CARDIOLOGY | Facility: CLINIC | Age: 52
Discharge: HOME | End: 2017-07-31
Attending: INTERNAL MEDICINE | Admitting: INTERNAL MEDICINE
Payer: OTHER MISCELLANEOUS

## 2017-07-31 ENCOUNTER — TELEPHONE (OUTPATIENT)
Dept: INTERNAL MEDICINE | Facility: CLINIC | Age: 52
End: 2017-07-31

## 2017-07-31 ENCOUNTER — HOSPITAL ENCOUNTER (OUTPATIENT)
Dept: CARDIOLOGY | Facility: CLINIC | Age: 52
End: 2017-07-31
Attending: INTERNAL MEDICINE
Payer: OTHER MISCELLANEOUS

## 2017-07-31 DIAGNOSIS — R55 SYNCOPE, UNSPECIFIED SYNCOPE TYPE: ICD-10-CM

## 2017-07-31 DIAGNOSIS — I47.20 VENTRICULAR TACHYCARDIA (H): Primary | ICD-10-CM

## 2017-07-31 PROCEDURE — 93306 TTE W/DOPPLER COMPLETE: CPT | Mod: 26 | Performed by: INTERNAL MEDICINE

## 2017-07-31 PROCEDURE — 0296T ZIO PATCH HOLTER: CPT

## 2017-07-31 PROCEDURE — 93306 TTE W/DOPPLER COMPLETE: CPT

## 2017-07-31 PROCEDURE — 0298T ZZC EXT ECG > 48HR TO 21 DAY REVIEW AND INTERPRETATN: CPT | Performed by: INTERNAL MEDICINE

## 2017-07-31 NOTE — TELEPHONE ENCOUNTER
Attending Physician's Statement of Disability from [employer] to be completed in your red folder [facility]

## 2017-08-07 NOTE — TELEPHONE ENCOUNTER
Patient called asking if forms were complete and if not can they be done soon because otherwise he will not get paid.

## 2017-08-07 NOTE — TELEPHONE ENCOUNTER
Form sent to me as I saw pt  Pt should have f/u with Dr. Campbell or myself a week after ZIO patch returned  Should bring another form to visit so we can release him back to work at that time if everything normal

## 2017-08-08 NOTE — TELEPHONE ENCOUNTER
Attending Physician's Statement of Disability form completed and faxed back to [employer] FAX: [phone], orig for chart, pt to return call and make appt and be informed of below MD message

## 2017-08-16 ENCOUNTER — TELEPHONE (OUTPATIENT)
Dept: INTERNAL MEDICINE | Facility: CLINIC | Age: 52
End: 2017-08-16

## 2017-08-16 NOTE — TELEPHONE ENCOUNTER
Spoke with patient and informed him of Dr. Prieto's note below.  Patient understood however had several questions;     1) Would MD be willing to write a note for patient dismissing him from work until cardiology appointment (8/24) due to fact that he is not to drive.  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  If possible, can note be released to [employer] for patient to access.     2) Can patient still lift weights?  RN advised patient to refrain from activity until further advisement from MD.  Please advise.

## 2017-08-16 NOTE — LETTER
[facility]  [address] [address]  [phone]      8/17/2017       Ricki Blanchard  [address] [facility] [address]        To whom it May Concern;  Due to Ricki's recent health condition, he is not able to drive until further evaluation which is 8/24/17. Please excuse him from work until that time. If you have any further questions please contact the clinic at [phone].      Sincerely,      Junior Prieto MD  Internal Medicine
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
## 2017-08-17 NOTE — TELEPHONE ENCOUNTER
A letter was already written saying he cannot drive.   You can add addendum to this one if needed

## 2017-08-24 ENCOUNTER — OFFICE VISIT (OUTPATIENT)
Dept: CARDIOLOGY | Facility: CLINIC | Age: 52
End: 2017-08-24
Attending: INTERNAL MEDICINE
Payer: COMMERCIAL

## 2017-08-24 VITALS
WEIGHT: 258.3 LBS | DIASTOLIC BLOOD PRESSURE: 91 MMHG | SYSTOLIC BLOOD PRESSURE: 159 MMHG | HEIGHT: 74 IN | HEART RATE: 64 BPM | BODY MASS INDEX: 33.15 KG/M2

## 2017-08-24 DIAGNOSIS — I47.20 VENTRICULAR TACHYCARDIA (H): ICD-10-CM

## 2017-08-24 DIAGNOSIS — Z72.0 TOBACCO ABUSE: ICD-10-CM

## 2017-08-24 DIAGNOSIS — E78.5 HYPERLIPIDEMIA LDL GOAL <160: ICD-10-CM

## 2017-08-24 DIAGNOSIS — R55 SYNCOPE, UNSPECIFIED SYNCOPE TYPE: Primary | ICD-10-CM

## 2017-08-24 PROCEDURE — 99204 OFFICE O/P NEW MOD 45 MIN: CPT | Performed by: INTERNAL MEDICINE

## 2017-08-24 RX ORDER — OMEGA-3 FATTY ACIDS/FISH OIL 300-1000MG
600 CAPSULE ORAL EVERY 4 HOURS PRN
Status: ON HOLD | COMMUNITY
End: 2022-04-13

## 2017-08-24 NOTE — MR AVS SNAPSHOT
After Visit Summary   8/24/2017    Ricki Blanchard    MRN: [id number]           Patient Information     Date Of Birth          1965        Visit Information        Provider Department      8/24/2017 8:15 AM Ignacio Garduno MD [facility]        Today's Diagnoses     Syncope, unspecified syncope type    -  1    Hyperlipidemia LDL goal <160        Ventricular tachycardia (H)        Tobacco abuse           Follow-ups after your visit        Additional Services     Follow-Up with Electrophysiologist                 Future tests that were ordered for you today     Open Future Orders        Priority Expected Expires Ordered    Follow-Up with Electrophysiologist Routine 8/28/2017 8/24/2018 8/24/2017    MRI Cardiac w/contrast and stress Routine 8/25/2017 8/24/2018 8/24/2017            Who to contact     If you have questions or need follow up information about today's clinic visit or your schedule please contact [facility] directly at [phone].  Normal or non-critical lab and imaging results will be communicated to you by [employer], letter or phone within 4 business days after the clinic has received the results. If you do not hear from us within 7 days, please contact the clinic through [employer] or phone. If you have a critical or abnormal lab result, we will notify you by phone as soon as possible.  Submit refill requests through [employer] or call your pharmacy and they will forward the refill request to us. Please allow 3 business days for your refill to be completed.          Additional Information About Your Visit        MyChart Information     [employer] gives you secure access to your electronic health record. If you see a primary care provider, you can also send messages to your care team and make appointments. If you have questions, please call your primary care clinic.  If you do not have a primary care provider,  "please call [phone] and they will assist you.        Care EveryWhere ID     This is your Care EveryWhere ID. This could be used by other organizations to access your [place] medical records  [id number]        Your Vitals Were     Pulse Height BMI (Body Mass Index)             64 1.88 m (6' 2\") 33.16 kg/m2          Blood Pressure from Last 3 Encounters:   08/24/17 (!) 159/91   07/27/17 126/90   07/25/17 144/87    Weight from Last 3 Encounters:   08/24/17 117.2 kg (258 lb 4.8 oz)   07/27/17 113.5 kg (250 lb 3.2 oz)   07/25/17 113.4 kg (250 lb)               Primary Care Provider Office Phone # Fax #    Ibrahima Campbell [phone]       [address] [facility] [address]        Equal Access to Services     SHERI [facility] Center[facility]AN JOSE : Hadii aad ku hadasho Soomaali, waaxda luqadaha, qaybta kaalmada adeegyada, waxay drewin hayvianeyn matthew shah . So [facility] [phone].    ATENCIÓN: Si habla español, tiene a mclean disposición servicios gratuitos de asistencia lingüística. Salomon al [phone].    We comply with applicable federal civil rights laws and [place] laws. We do not discriminate on the basis of race, color, national origin, age, disability sex, sexual orientation or gender identity.            Thank you!     Thank you for choosing [facility] [place]  for your care. Our goal is always to provide you with excellent care. Hearing back from our patients is one way we can continue to improve our services. Please take a few minutes to complete the written survey that you may receive in the mail after your visit with us. Thank you!             Your Updated Medication List - Protect others around you: Learn how to safely use, store and throw away your medicines at www.disposemymeds.org.          This list is accurate as of: 8/24/17  9:10 AM.  Always use your most recent med list.                   Brand Name Dispense Instructions for use Diagnosis    ADVIL 200 MG " capsule   Generic drug:  ibuprofen      Take 200 mg by mouth as needed for fever

## 2017-08-24 NOTE — LETTER
8/24/2017    Junior Prieto MD  [address] [place] [address]    RE: Ricki Blanchard       Dear Colleague,    I had the pleasure of seeing Ricki Blanchard in the [facility].    I had the opportunity to see Mr. Ricki Blanchard in Cardiology Clinic today at the [facility] in [place] for consultation regarding an episode of syncope and tachycardia identified on ZIO Patch monitor.  Mr. Blanchard is a 51-year-old gentleman with no prior cardiac history who had just finished urinating at a convenience store and got him back in his van to drive to work when he had syncope.  He tells me that he was planning to take a left turn out of the gas station when he suddenly had a severe sharp pain in his lower abdomen, felt a sudden brief rush to his head and then found himself waking up in his van on a football field across the road.  Apparently he has gone over the curb and through a chain link fence before his van came to rest on the football field.  He had some pain in his neck and called 911.  He was brought into the emergency room at [facility] and had CT scan of the neck, an MRI of the brain which all looked fine.  He had an ECG which looked normal and a series of labs which were normal as well including troponin and electrolytes and glucose.  Based on the recent urination and abdominal discomfort and perhaps some prodromal symptoms, this was thought to be a vasovagal event and he was sent home to follow up with his primary care office at [facility].  He saw Dr. Prieto and had a ZIO Patch monitor placed for 7 days.  I reviewed the strips of that monitor myself and discussed those strips with Dr. Arauz with Electrophysiology.  There was 1 episode of tachycardia that appeared slightly different in morphology than his baseline.  The QRS duration was not very wide, but on a single lead strip this is difficult to identify as possible SVT versus ventricular  tachycardia.  This episode was 8.1 seconds in duration at a heart rate of 160 beats per minute and the final report from Dr. Russell suggested that this could be nonsustained ventricular tachycardia, but also could be SVT with some aberrancy.  This was a patient triggered event and he felt a little bit of fluttering in his chest at the time this occurred as well as a moment of brief lightheadedness.  He has these similar episodes on a fairly regular basis, occurring without predictability and not specifically with exertion.  He often feels them at night.  Most of the time there is no lightheadedness associated with these.  He typically just feels some fluttering.  The episodes are usually momentary.  He has had only 2 episodes of syncope in the past.  One occurred while he was in the shower about 6 months ago.  He had no warning whatsoever and found himself waking up on the bathroom floor on top of the shower curtain with the shower still running.  He is not sure how long he might have been unconscious.  The previous episode of syncope was 30 years ago.  At that time, he was experiencing a severe migraine headache and passed out and then bumped his head.  He has not been having any migraines recently.  He does have chronic headaches that are not as severe as what he used to have.  He has no other previous history of any similar vasovagal sort of situations in the past.  He was in the  and tells me that he stood at attention quite frequently and never had any syncopal or lightheadedness episodes doing that.  He has no history of syncope as a child.      He has no prior history of coronary artery disease events.  He did have an echocardiogram recently as part of this evaluation on 07/31/2017 and that looked normal with an ejection fraction of 60%.  He had a stress echocardiogram back in 2005 which was completely normal except for hypertension and hypertensive response to exercise.  He apparently was having some  chest discomfort at that time.  His recent 12-lead ECG showed normal sinus rhythm without abnormality.  I reviewed that ECG myself.  He does have hypercholesterolemia with a recent LDL of 161, HDL 41 and triglycerides 271.  His LDL prior to that was 183.  He has diet-controlled diabetes with a hemoglobin A1c of 6.6 in 2016 and he continues to smoke cigarettes.  He smoked for many years, although he did go for about 8 or 10 years without smoking until he started again.  He does not have a family history of premature coronary artery disease and was never told that he has hypertension.        PHYSICAL EXAMINATION:   VITAL SIGNS:  On examination today, his blood pressure is 159/91, heart rate 64 and weight 258 pounds.  His last blood pressure on 07/27/2017 was 126/90.   LUNGS:  His lungs are clear.     CARDIAC:  His heart rhythm is regular.  He has no cardiac murmurs or carotid bruits.    EXTREMITIES:  No edema.     Outpatient Encounter Prescriptions as of 8/24/2017   Medication Sig Dispense Refill     ibuprofen (ADVIL) 200 MG capsule Take 200 mg by mouth as needed for fever       No facility-administered encounter medications on file as of 8/24/2017.       IMPRESSIONS:  Mr. Ricki Blanchard is a 51-year-old gentleman with diet-controlled type 2 diabetes, ongoing cigarette smoking, and significant hypercholesterolemia who presented to the emergency room recently on 07/25/2017 with a syncopal episode.  It was thought initially to be due to a vasovagal etiology, which certainly could be the case.  He had an episode in the shower which also could certainly have been vasovagal or due to situational hypotension.  However, he had a ZIO Patch monitor performed which shows an episode of tachycardia which appears to be likely due to ventricular tachycardia, although this is not immediately clear on a single lead rhythm strip.  There is some possibility that this could also be SVT.  He has significant cardiac risk factors including  dyslipidemia, cigarette smoking and diet-controlled type 2 diabetes, which raised the possibility of coronary artery disease playing a role here.      I discussed this with Dr. Arauz who suggested that this does not appear to be a situation that requires hospitalization and suggested a stress MRI for further evaluation of the possibility of coronary artery disease as well as evaluation of other myocardial abnormalities that could cause arrhythmia.      After his stress MRI, I will have him follow up with Electrophysiology to discuss this further and decide whether electrophysiology study is required for further evaluation.  I have not started any medical therapy for now.  I have discouraged Mr. Blanchard from exercising vigorously and he is not driving until this issue gets sorted out.  I have also encouraged him to stop smoking.            Again, thank you for allowing me to participate in the care of your patient.      Sincerely,    ERIC BROWN MD     [facility]    cc:   Ibrahima Campbell MD  [address] [place] [id number]

## 2017-08-24 NOTE — PROGRESS NOTES
HPI and Plan:   See dictation    Orders Placed This Encounter   Procedures     MRI Cardiac w/contrast and stress     Follow-Up with Electrophysiologist       Orders Placed This Encounter   Medications     ibuprofen (ADVIL) 200 MG capsule     Sig: Take 200 mg by mouth as needed for fever       There are no discontinued medications.      Encounter Diagnoses   Name Primary?     Hyperlipidemia LDL goal <160      Syncope, unspecified syncope type Yes     Ventricular tachycardia (H)      Tobacco abuse        CURRENT MEDICATIONS:  Current Outpatient Prescriptions   Medication Sig Dispense Refill     ibuprofen (ADVIL) 200 MG capsule Take 200 mg by mouth as needed for fever         ALLERGIES     Allergies   Allergen Reactions     Seasonal Allergies Other (See Comments)     Sneezing, watery eyes       PAST MEDICAL HISTORY:  Past Medical History:   Diagnosis Date     Chronic pain     disc problems     Headache(784.0)     like migraines     Hyperlipidemia LDL goal <160 9/1/2011         Migraine headaches 10/25/2010     Neck pain      Obesity (BMI 30-39.9) 4/28/14    BMI 33.4     Prediabetes     A1C 6.5     Tobacco abuse 10/25/2010       PAST SURGICAL HISTORY:  Past Surgical History:   Procedure Laterality Date     REPLACE DISK CERVICAL ANTERIOR  12/13/2011    Procedure:REPLACE DISK CERVICAL ANTERIOR; C5-6 ARTHROPLASTY (PRO DISC-C) (c-arm); Surgeon:ZINA JOSHI; Location: OR       FAMILY HISTORY:  Family History   Problem Relation Age of Onset     Lipids Mother      Lipids Father        SOCIAL HISTORY:  Social History     Social History     Marital status:      Spouse name: N/A     Number of children: N/A     Years of education: N/A     Social History Main Topics     Smoking status: Current Every Day Smoker     Packs/day: 0.50     Years: 20.00     Types: Cigarettes     Last attempt to quit: 12/12/2011     Smokeless tobacco: Never Used      Comment: 1/2-1 pk daily     Alcohol use No     Drug use: No      "Sexual activity: Yes     Partners: Female     Other Topics Concern     None     Social History Narrative       Review of Systems:  Skin:  Negative       Eyes:  Positive for glasses    ENT:  Negative      Respiratory:  Negative       Cardiovascular:    Positive for;syncope or near-syncope;lightheadedness;dizziness 3 episodes of syncope  Gastroenterology: Negative      Genitourinary:  Negative      Musculoskeletal:  Negative      Neurologic:  Positive for migraine headaches    Psychiatric:  Positive for depression    Heme/Lymph/Imm:  Positive for allergies    Endocrine:  Negative        Physical Exam:  Vitals: BP (!) 159/91  Pulse 64  Ht 1.88 m (6' 2\")  Wt 117.2 kg (258 lb 4.8 oz)  BMI 33.16 kg/m2    Constitutional:  cooperative, alert and oriented, well developed, well nourished, in no acute distress        Skin:  warm and dry to the touch, no apparent skin lesions or masses noted        Head:  normocephalic, no masses or lesions        Eyes:  pupils equal and round, conjunctivae and lids unremarkable, sclera white, no xanthalasma, EOMS intact, no nystagmus        ENT:  no pallor or cyanosis, dentition good        Neck:  carotid pulses are full and equal bilaterally, JVP normal, no carotid bruit, no thyromegaly        Chest:  normal breath sounds, clear to auscultation, normal A-P diameter, normal symmetry, normal respiratory excursion, no use of accessory muscles          Cardiac: regular rhythm, normal S1/S2, no S3 or S4, apical impulse not displaced, no murmurs, gallops or rubs                  Abdomen:  abdomen soft;BS normoactive        Vascular: pulses full and equal, no bruits auscultated                                        Extremities and Back:  no edema;no deformities, clubbing, cyanosis, erythema observed              Neurological:  affect appropriate, oriented to time, person and place;no gross motor deficits              CC  Junior Prieto MD  [address] [place] " [id number]

## 2017-08-24 NOTE — PROGRESS NOTES
HISTORY OF PRESENT ILLNESS:  I had the opportunity to see Mr. Ricki Blanchard in Cardiology Clinic today at the [facility] in [place] for consultation regarding an episode of syncope and tachycardia identified on ZIO Patch monitor.  Mr. Blanchard is a 51-year-old gentleman with no prior cardiac history who had just finished urinating at a convenience store and got him back in his van to drive to work when he had syncope.  He tells me that he was planning to take a left turn out of the gas station when he suddenly had a severe sharp pain in his lower abdomen, felt a sudden brief rush to his head and then found himself waking up in his van on a football field across the road.  Apparently he has gone over the curb and through a chain link fence before his van came to rest on the football field.  He had some pain in his neck and called 911.  He was brought into the emergency room at [facility] and had CT scan of the neck, an MRI of the brain which all looked fine.  He had an ECG which looked normal and a series of labs which were normal as well including troponin and electrolytes and glucose.  Based on the recent urination and abdominal discomfort and perhaps some prodromal symptoms, this was thought to be a vasovagal event and he was sent home to follow up with his primary care office at [facility].  He saw Dr. Prieto and had a ZIO Patch monitor placed for 7 days.  I reviewed the strips of that monitor myself and discussed those strips with Dr. Arauz with Electrophysiology.  There was 1 episode of tachycardia that appeared slightly different in morphology than his baseline.  The QRS duration was not very wide, but on a single lead strip this is difficult to identify as possible SVT versus ventricular tachycardia.  This episode was 8.1 seconds in duration at a heart rate of 160 beats per minute and the final report from Dr. Russell suggested that this could be nonsustained ventricular tachycardia,  but also could be SVT with some aberrancy.  This was a patient triggered event and he felt a little bit of fluttering in his chest at the time this occurred as well as a moment of brief lightheadedness.  He has these similar episodes on a fairly regular basis, occurring without predictability and not specifically with exertion.  He often feels them at night.  Most of the time there is no lightheadedness associated with these.  He typically just feels some fluttering.  The episodes are usually momentary.  He has had only 2 episodes of syncope in the past.  One occurred while he was in the shower about 6 months ago.  He had no warning whatsoever and found himself waking up on the bathroom floor on top of the shower curtain with the shower still running.  He is not sure how long he might have been unconscious.  The previous episode of syncope was 30 years ago.  At that time, he was experiencing a severe migraine headache and passed out and then bumped his head.  He has not been having any migraines recently.  He does have chronic headaches that are not as severe as what he used to have.  He has no other previous history of any similar vasovagal sort of situations in the past.  He was in the  and tells me that he stood at attention quite frequently and never had any syncopal or lightheadedness episodes doing that.  He has no history of syncope as a child.      He has no prior history of coronary artery disease events.  He did have an echocardiogram recently as part of this evaluation on 07/31/2017 and that looked normal with an ejection fraction of 60%.  He had a stress echocardiogram back in 2005 which was completely normal except for hypertension and hypertensive response to exercise.  He apparently was having some chest discomfort at that time.  His recent 12-lead ECG showed normal sinus rhythm without abnormality.  I reviewed that ECG myself.  He does have hypercholesterolemia with a recent LDL of 161, HDL  41 and triglycerides 271.  His LDL prior to that was 183.  He has diet-controlled diabetes with a hemoglobin A1c of 6.6 in 2016 and he continues to smoke cigarettes.  He smoked for many years, although he did go for about 8 or 10 years without smoking until he started again.  He does not have a family history of premature coronary artery disease and was never told that he has hypertension.        PHYSICAL EXAMINATION:   VITAL SIGNS:  On examination today, his blood pressure is 159/91, heart rate 64 and weight 258 pounds.  His last blood pressure on 07/27/2017 was 126/90.   LUNGS:  His lungs are clear.     CARDIAC:  His heart rhythm is regular.  He has no cardiac murmurs or carotid bruits.    EXTREMITIES:  No edema.      IMPRESSIONS:  Mr. Ricki Blanchard is a 51-year-old gentleman with diet-controlled type 2 diabetes, ongoing cigarette smoking, and significant hypercholesterolemia who presented to the emergency room recently on 07/25/2017 with a syncopal episode.  It was thought initially to be due to a vasovagal etiology, which certainly could be the case.  He had an episode in the shower which also could certainly have been vasovagal or due to situational hypotension.  However, he had a ZIO Patch monitor performed which shows an episode of tachycardia which appears to be likely due to ventricular tachycardia, although this is not immediately clear on a single lead rhythm strip.  There is some possibility that this could also be SVT.  He has significant cardiac risk factors including dyslipidemia, cigarette smoking and diet-controlled type 2 diabetes, which raised the possibility of coronary artery disease playing a role here.      I discussed this with Dr. Arauz who suggested that this does not appear to be a situation that requires hospitalization and suggested a stress MRI for further evaluation of the possibility of coronary artery disease as well as evaluation of other myocardial abnormalities that could cause  arrhythmia.      After his stress MRI, I will have him follow up with Electrophysiology to discuss this further and decide whether electrophysiology study is required for further evaluation.  I have not started any medical therapy for now.  I have discouraged Mr. Blanchard from exercising vigorously and he is not driving until this issue gets sorted out.  I have also encouraged him to stop smoking.      cc:   [place] [address]      Junior Prieto MD   [address]  [id number]         ERIC BROWN MD, [facility]             D: 2017 10:59   T: 2017 13:30   MT: CHRIS      Name:     VALERIE BLANCHARD   MRN:      [id number]        Account:      [id number]   :      1965           Service Date: 2017      Document: [id number]

## 2017-08-28 ENCOUNTER — HOSPITAL ENCOUNTER (OUTPATIENT)
Dept: CARDIOLOGY | Facility: CLINIC | Age: 52
Discharge: HOME | End: 2017-08-28
Attending: INTERNAL MEDICINE | Admitting: INTERNAL MEDICINE
Payer: COMMERCIAL

## 2017-08-28 ENCOUNTER — TELEPHONE (OUTPATIENT)
Dept: CARDIOLOGY | Facility: CLINIC | Age: 52
End: 2017-08-28

## 2017-08-28 VITALS — SYSTOLIC BLOOD PRESSURE: 131 MMHG | HEART RATE: 61 BPM | OXYGEN SATURATION: 97 % | DIASTOLIC BLOOD PRESSURE: 75 MMHG

## 2017-08-28 DIAGNOSIS — I47.20 VENTRICULAR TACHYCARDIA (H): ICD-10-CM

## 2017-08-28 PROCEDURE — 75563 CARD MRI W/STRESS IMG & DYE: CPT | Mod: 26 | Performed by: INTERNAL MEDICINE

## 2017-08-28 PROCEDURE — 93017 CV STRESS TEST TRACING ONLY: CPT

## 2017-08-28 PROCEDURE — A9585 GADOBUTROL INJECTION: HCPCS | Performed by: INTERNAL MEDICINE

## 2017-08-28 PROCEDURE — 25000128 H RX IP 250 OP 636: Performed by: INTERNAL MEDICINE

## 2017-08-28 PROCEDURE — 93016 CV STRESS TEST SUPVJ ONLY: CPT | Performed by: INTERNAL MEDICINE

## 2017-08-28 PROCEDURE — 93018 CV STRESS TEST I&R ONLY: CPT | Performed by: INTERNAL MEDICINE

## 2017-08-28 RX ORDER — ALBUTEROL SULFATE 90 UG/1
2 AEROSOL, METERED RESPIRATORY (INHALATION) EVERY 5 MIN PRN
Status: DISCONTINUED | OUTPATIENT
Start: 2017-08-28 | End: 2017-08-29 | Stop reason: HOSPADM

## 2017-08-28 RX ORDER — DIAZEPAM 5 MG
5 TABLET ORAL EVERY 30 MIN PRN
Status: DISCONTINUED | OUTPATIENT
Start: 2017-08-28 | End: 2017-08-29 | Stop reason: HOSPADM

## 2017-08-28 RX ORDER — METHYLPREDNISOLONE SODIUM SUCCINATE 125 MG/2ML
125 INJECTION, POWDER, LYOPHILIZED, FOR SOLUTION INTRAMUSCULAR; INTRAVENOUS
Status: DISCONTINUED | OUTPATIENT
Start: 2017-08-28 | End: 2017-08-29 | Stop reason: HOSPADM

## 2017-08-28 RX ORDER — DIPHENHYDRAMINE HCL 25 MG
25 CAPSULE ORAL
Status: DISCONTINUED | OUTPATIENT
Start: 2017-08-28 | End: 2017-08-29 | Stop reason: HOSPADM

## 2017-08-28 RX ORDER — DIPHENHYDRAMINE HYDROCHLORIDE 50 MG/ML
25-50 INJECTION INTRAMUSCULAR; INTRAVENOUS
Status: DISCONTINUED | OUTPATIENT
Start: 2017-08-28 | End: 2017-08-29 | Stop reason: HOSPADM

## 2017-08-28 RX ORDER — ACYCLOVIR 200 MG/1
0-1 CAPSULE ORAL
Status: DISCONTINUED | OUTPATIENT
Start: 2017-08-28 | End: 2017-08-29 | Stop reason: HOSPADM

## 2017-08-28 RX ORDER — GADOBUTROL 604.72 MG/ML
5-65 INJECTION INTRAVENOUS ONCE
Status: COMPLETED | OUTPATIENT
Start: 2017-08-28 | End: 2017-08-28

## 2017-08-28 RX ORDER — REGADENOSON 0.08 MG/ML
0.4 INJECTION, SOLUTION INTRAVENOUS ONCE
Status: COMPLETED | OUTPATIENT
Start: 2017-08-28 | End: 2017-08-28

## 2017-08-28 RX ORDER — AMINOPHYLLINE 25 MG/ML
100 INJECTION, SOLUTION INTRAVENOUS ONCE
Status: COMPLETED | OUTPATIENT
Start: 2017-08-28 | End: 2017-08-28

## 2017-08-28 RX ORDER — ONDANSETRON 2 MG/ML
4 INJECTION INTRAMUSCULAR; INTRAVENOUS
Status: DISCONTINUED | OUTPATIENT
Start: 2017-08-28 | End: 2017-08-29 | Stop reason: HOSPADM

## 2017-08-28 RX ADMIN — AMINOPHYLLINE 100 MG: 25 INJECTION, SOLUTION INTRAVENOUS at 11:58

## 2017-08-28 RX ADMIN — GADOBUTROL 30 ML: 604.72 INJECTION INTRAVENOUS at 13:00

## 2017-08-28 RX ADMIN — REGADENOSON 0.4 MG: 0.08 INJECTION, SOLUTION INTRAVENOUS at 11:56

## 2017-08-28 NOTE — TELEPHONE ENCOUNTER
Phone call to patient to inform him that Dr. Garduno reviewed his c-MRI stress done today and it showed no evidence for any perfusion problems or issues with the heart muscle itself. He was pleased to know of this. I reminded him that this was done because his recent Ziopatch showed an episode of v-tach along with having had recent episode of syncope. I reminded him that he has an EP appointment on 9/11 with [facility] who will review this study as well as his ZioPatch and determine if further testing is indicated. He had no questions for me at this time. Gilma

## 2017-08-31 ENCOUNTER — OFFICE VISIT (OUTPATIENT)
Dept: INTERNAL MEDICINE | Facility: CLINIC | Age: 52
End: 2017-08-31
Payer: COMMERCIAL

## 2017-08-31 VITALS
HEART RATE: 69 BPM | SYSTOLIC BLOOD PRESSURE: 154 MMHG | DIASTOLIC BLOOD PRESSURE: 102 MMHG | WEIGHT: 258.6 LBS | TEMPERATURE: 99.2 F | OXYGEN SATURATION: 98 % | BODY MASS INDEX: 33.2 KG/M2

## 2017-08-31 DIAGNOSIS — R55 SYNCOPE, UNSPECIFIED SYNCOPE TYPE: ICD-10-CM

## 2017-08-31 DIAGNOSIS — R03.0 ELEVATED BLOOD PRESSURE READING WITHOUT DIAGNOSIS OF HYPERTENSION: ICD-10-CM

## 2017-08-31 DIAGNOSIS — N52.9 ERECTILE DYSFUNCTION, UNSPECIFIED ERECTILE DYSFUNCTION TYPE: ICD-10-CM

## 2017-08-31 DIAGNOSIS — H61.23 BILATERAL IMPACTED CERUMEN: Primary | ICD-10-CM

## 2017-08-31 PROCEDURE — 99214 OFFICE O/P EST MOD 30 MIN: CPT | Mod: 25 | Performed by: INTERNAL MEDICINE

## 2017-08-31 PROCEDURE — 69210 REMOVE IMPACTED EAR WAX UNI: CPT | Mod: 50 | Performed by: INTERNAL MEDICINE

## 2017-08-31 NOTE — PROGRESS NOTES
SUBJECTIVE:   Ricki Blanchard is a 51 year old male who presents to clinic today for the following health issues:      Chief Complaint   Patient presents with     Ear Problem     c/o muffled hwaring in R ear X 3-2 days, denies pain or drainage            Problem list and histories reviewed & adjusted, as indicated.  Additional history: as documented        Reviewed and updated as needed this visit by clinical staff  Tobacco  Allergies       Reviewed and updated as needed this visit by Provider           Past Medical History:  ---------------------------  Past Medical History:   Diagnosis Date     Chronic pain     disc problems     Headache(784.0)     like migraines     Hyperlipidemia LDL goal <160 9/1/2011         Migraine headaches 10/25/2010     Neck pain      Obesity (BMI 30-39.9) 4/28/14    BMI 33.4     Prediabetes     A1C 6.5     Tobacco abuse 10/25/2010       Past Surgical History:  ---------------------------  Past Surgical History:   Procedure Laterality Date     MR CARDIAC W CONTRAST AND STRESS  08/28/2017    negative cardiac MRI     REPLACE DISK CERVICAL ANTERIOR  12/13/2011    Procedure:REPLACE DISK CERVICAL ANTERIOR; C5-6 ARTHROPLASTY (PRO DISC-C) (c-arm); Surgeon:ZINA JOSHI; Location:[facility] OR       Current Medications:  ---------------------------  Current Outpatient Prescriptions   Medication Sig Dispense Refill     ibuprofen (ADVIL) 200 MG capsule Take 200 mg by mouth as needed for fever         Allergies:  -------------  Allergies   Allergen Reactions     Seasonal Allergies Other (See Comments)     Sneezing, watery eyes       Social History:  -------------------  Social History     Social History     Marital status:      Spouse name: N/A     Number of children: N/A     Years of education: N/A     Occupational History     Not on file.     Social History Main Topics     Smoking status: Current Every Day Smoker     Packs/day: 0.50     Years: 20.00     Types: Cigarettes      Last attempt to quit: 12/12/2011     Smokeless tobacco: Never Used      Comment: 1/2-1 pk daily     Alcohol use No     Drug use: No     Sexual activity: Yes     Partners: Female     Other Topics Concern     Not on file     Social History Narrative       Family Medical History:  ------------------------------  Family History   Problem Relation Age of Onset     Lipids Mother      Lipids Father          ROS:  REVIEW OF SYSTEMS:    RESP: negative for cough, dyspnea, wheezing, hemoptysis  CV: negative for chest pain, palpitations, PND, JACKSON, orthopnea; reports no changes in their ability to perform physical activity (from cardiovascular standpoint)  GI: negative for dysphagia, N/V, pain, melena, diarrhea and constipation  NEURO: negative for numbness/tingling, paralysis, incoordination, or focal weakness     OBJECTIVE:                                                    BP (!) 154/102  Pulse 69  Temp 99.2  F (37.3  C) (Oral)  Wt 258 lb 9.6 oz (117.3 kg)  SpO2 98%  BMI 33.2 kg/m2     GENERAL alert and no distress  EYES:  Normal sclera,conjunctiva, EOMI  EAR:  both ear canals show thick wax, occluding most of the ear canals, unable to fully see TMs.  The wax was removed from both ear canals with a combination of loop removal by MD and water irrigation.   Canals were normal after, TMs normal.  Pt tolerated well   HENT: oral and posterior pharynx without lesions or erythema, facies symmetric  NECK: Neck supple. No LAD, without thyroidmegaly or JVD., Carotids without bruits.  RESP: Clear to ausculation bilaterally without wheezes or crackles. Normal BS in all fields.  CV: RRR normal S1S2 without murmurs, rubs or gallops. PMI normal  LYMPH: no cervical lymph adenopathy appreciated  MS: extremities- no gross deformities of the visible extremities noted, no edema  PSYCH: Alert and oriented times 3; speech- coherent  SKIN:  No obvious significant skin lesions on visible portions of face          ASSESSMENT/PLAN:                                                       (H61.23) Bilateral impacted cerumen  (primary encounter diagnosis)  Comment: Impacted cerumen was removed with a combination of loop removal by MD and water irrigation.  Canal and TM clear afterwards.  Discussed strategies for preventing future ear wax buildups.   Plan:     (R03.0) Elevated blood pressure reading without diagnosis of hypertension  Comment: The blood pressure was higher than expected today, which is not usual for the patient.  Discussed the diagnostic criteria for HTN according to JNC VII.  Discussed the rationale for aggressive management of HTN including diagnosis, starting treatment when indicated, and importance of regular follow up to ensure adequate treatment.  Will have the patient check their BPs outside clinic and return for consideration for treatment if the BPs remain outside the normal range on regular basis.   Plan:     (R55) Syncope, unspecified syncope type  Comment: unclear reason.   Plan:     (N52.9) Erectile dysfunction, unspecified erectile dysfunction type  Comment: /Discussed erectile dysfunction in detail including a review of the physiology that produces erections.  Reviewed typical causes of impotence such as medication side effect, diabetes, neuropathies, drugs/alcohol, underlying mood disorder, relationship issues, neurogenic causes, hypogonadism.  Discussed typical medical evaluation including physical exam findings, labs (thyroid, testosterone, BMP, etc.)  Treatment will include modifying any causative features if able, stopping nay substance use, treating low testosterone levels, and consideration of medications (Viagra/Levitra/Cialis).     Discussed the risks and benefits of these meds.  Discussed the potential side effects of Viagra/Levitra/Cialis including dizziness, headache, vision changes, syncope, GI upset/dyspepsia, hypotension.  Recommended avoiding taking with large meal to improve absorption.  Avoid taking with alcohol.   Never take nitroglycerin containing compounds when on these medications.    WIll start with a lower dose and titrate upward as needed, aiming for the lowest effective dose.  If effective, they will call for a prescription provided the medication if effective and there are no side effects.    Plan:        See Patient Instructions    PAMELA FONTENOT M.D., MD  [facility]

## 2017-08-31 NOTE — NURSING NOTE
"Chief Complaint   Patient presents with     Ear Problem     c/o muffled hwaring in R ear X 3-2 days, denies pain or drainage        Initial BP (!) 154/102  Pulse 69  Temp 99.2  F (37.3  C) (Oral)  Wt 258 lb 9.6 oz (117.3 kg)  SpO2 98%  BMI 33.2 kg/m2 Estimated body mass index is 33.2 kg/(m^2) as calculated from the following:    Height as of 8/24/17: 6' 2\" (1.88 m).    Weight as of this encounter: 258 lb 9.6 oz (117.3 kg).  Medication Reconciliation: complete   Emely Dodd CMA      "

## 2017-08-31 NOTE — MR AVS SNAPSHOT
After Visit Summary   8/31/2017    Ricki Blanchard    MRN: [id number]           Patient Information     Date Of Birth          1965        Visit Information        Provider Department      8/31/2017 1:00 PM Ibrahima Campbell MD [facility]        Today's Diagnoses     Bilateral impacted cerumen    -  1    Elevated blood pressure reading without diagnosis of hypertension        Syncope, unspecified syncope type        Erectile dysfunction, unspecified erectile dysfunction type          Care Instructions    Continue all medications at the same doses.  Contact your usual pharmacy if you need refills.             Follow-ups after your visit        Your next 10 appointments already scheduled     Sep 11, 2017  8:15 AM CDT   EP NEW with Wil Carlton MD   [facility]YSIC[facility]ANS [facility] AT [place] ([facility])    [address] [place] [id number] [phone]              Who to contact     If you have questions or need follow up information about today's clinic visit or your schedule please contact [facility] directly at [phone].  Normal or non-critical lab and imaging results will be communicated to you by [email or web address], letter or phone within 4 business days after the clinic has received the results. If you do not hear from us within 7 days, please contact the clinic through MyChart or phone. If you have a critical or abnormal lab result, we will notify you by phone as soon as possible.  Submit refill requests through [employer] or call your pharmacy and they will forward the refill request to us. Please allow 3 business days for your refill to be completed.          Additional Information About Your Visit        [email or web address] Information     [employer] gives you secure access to your electronic health record. If you see a primary care provider, you can also send messages to your care team and make appointments. If  you have questions, please call your primary care clinic.  If you do not have a primary care provider, please call [phone] and they will assist you.        Care EveryWhere ID     This is your Care EveryWhere ID. This could be used by other organizations to access your [place] medical records  [id number]        Your Vitals Were     Pulse Temperature Pulse Oximetry BMI (Body Mass Index)          69 99.2  F (37.3  C) (Oral) 98% 33.2 kg/m2         Blood Pressure from Last 3 Encounters:   08/31/17 (!) 154/102   08/28/17 131/75   08/24/17 (!) 159/91    Weight from Last 3 Encounters:   08/31/17 258 lb 9.6 oz (117.3 kg)   08/24/17 258 lb 4.8 oz (117.2 kg)   07/27/17 250 lb 3.2 oz (113.5 kg)              We Performed the Following     [facility]        Primary Care Provider Office Phone # Fax #    Ibrahima Campbell [phone]       [address] [place] [address]        Equal Access to Services     [facility] HospitalJUAN JOSE : Hadii aad ku hadasho Soomaali, waaxda luqadaha, qaybta kaalmada adekandice, mike shah . So [facility] [phone].    ATENCIÓN: Si habla español, tiene a mclean disposición servicios gratuitos de asistencia lingüística. [facility] [phone].    We comply with applicable federal civil rights laws and [place] laws. We do not discriminate on the basis of race, color, national origin, age, disability sex, sexual orientation or gender identity.            Thank you!     Thank you for choosing [facility]  for your care. Our goal is always to provide you with excellent care. Hearing back from our patients is one way we can continue to improve our services. Please take a few minutes to complete the written survey that you may receive in the mail after your visit with us. Thank you!             Your Updated Medication List - Protect others around you: Learn how to safely use, store and throw away your medicines at  www.disposemymeds.org.          This list is accurate as of: 8/31/17 11:59 PM.  Always use your most recent med list.                   Brand Name Dispense Instructions for use Diagnosis    ADVIL 200 MG capsule   Generic drug:  ibuprofen      Take 200 mg by mouth as needed for fever

## 2017-09-11 ENCOUNTER — OFFICE VISIT (OUTPATIENT)
Dept: CARDIOLOGY | Facility: CLINIC | Age: 52
End: 2017-09-11
Attending: INTERNAL MEDICINE
Payer: COMMERCIAL

## 2017-09-11 VITALS
HEIGHT: 74 IN | SYSTOLIC BLOOD PRESSURE: 110 MMHG | DIASTOLIC BLOOD PRESSURE: 70 MMHG | BODY MASS INDEX: 33.11 KG/M2 | WEIGHT: 258 LBS | HEART RATE: 60 BPM

## 2017-09-11 DIAGNOSIS — I47.10 PAROXYSMAL SUPRAVENTRICULAR TACHYCARDIA (H): Primary | ICD-10-CM

## 2017-09-11 DIAGNOSIS — I47.20 VENTRICULAR TACHYCARDIA (H): ICD-10-CM

## 2017-09-11 PROCEDURE — 99204 OFFICE O/P NEW MOD 45 MIN: CPT | Performed by: INTERNAL MEDICINE

## 2017-09-11 NOTE — PROGRESS NOTES
HPI and Plan:   See dictation  [id number]  Orders Placed This Encounter   Procedures     EP Ablation/ EP Studies       No orders of the defined types were placed in this encounter.      There are no discontinued medications.      Encounter Diagnoses   Name Primary?     Ventricular tachycardia (H)      Paroxysmal supraventricular tachycardia (H) Yes       CURRENT MEDICATIONS:  Current Outpatient Prescriptions   Medication Sig Dispense Refill     ibuprofen (ADVIL) 200 MG capsule Take 200 mg by mouth as needed for fever         ALLERGIES     Allergies   Allergen Reactions     Seasonal Allergies Other (See Comments)     Sneezing, watery eyes       PAST MEDICAL HISTORY:  Past Medical History:   Diagnosis Date     Chronic pain     disc problems     Headache(784.0)     like migraines     Hyperlipidemia LDL goal <160 9/1/2011         Migraine headaches 10/25/2010     Neck pain      NSVT (nonsustained ventricular tachycardia) (H)      Obesity (BMI 30-39.9) 4/28/14    BMI 33.4     Prediabetes     A1C 6.5     Syncope      Tobacco abuse 10/25/2010       PAST SURGICAL HISTORY:  Past Surgical History:   Procedure Laterality Date     MR CARDIAC W CONTRAST AND STRESS  08/28/2017    negative cardiac MRI     REPLACE DISK CERVICAL ANTERIOR  12/13/2011    Procedure:REPLACE DISK CERVICAL ANTERIOR; C5-6 ARTHROPLASTY (PRO DISC-C) (c-arm); Surgeon:ZINA JOSHI; Location: OR       FAMILY HISTORY:  Family History   Problem Relation Age of Onset     Lipids Mother      Lipids Father        SOCIAL HISTORY:  Social History     Social History     Marital status:      Spouse name: N/A     Number of children: N/A     Years of education: N/A     Social History Main Topics     Smoking status: Current Every Day Smoker     Packs/day: 0.50     Years: 20.00     Types: Cigarettes     Last attempt to quit: 12/12/2011     Smokeless tobacco: Never Used      Comment: 1/2-1 pk daily     Alcohol use No     Drug use: No     Sexual  "activity: Yes     Partners: Female     Other Topics Concern     None     Social History Narrative       Review of Systems:  Skin:  Negative       Eyes:  Positive for glasses    ENT:  Negative      Respiratory:  Negative       Cardiovascular:    Positive for;syncope or near-syncope;lightheadedness;dizziness 3 episodes of syncope  Gastroenterology: Negative      Genitourinary:  Negative      Musculoskeletal:  Negative      Neurologic:  Positive for migraine headaches    Psychiatric:  Positive for depression    Heme/Lymph/Imm:  Positive for allergies    Endocrine:  Negative        Physical Exam:  Vitals: /70  Pulse 60  Ht 1.88 m (6' 2\")  Wt 117 kg (258 lb)  BMI 33.13 kg/m2    Constitutional:  cooperative, alert and oriented, well developed, well nourished, in no acute distress        Skin:  warm and dry to the touch, no apparent skin lesions or masses noted        Head:  normocephalic, no masses or lesions        Eyes:  pupils equal and round, conjunctivae and lids unremarkable, sclera white, no xanthalasma, EOMS intact, no nystagmus        ENT:  no pallor or cyanosis, dentition good        Neck:  carotid pulses are full and equal bilaterally, JVP normal, no carotid bruit, no thyromegaly        Chest:  normal breath sounds, clear to auscultation, normal A-P diameter, normal symmetry, normal respiratory excursion, no use of accessory muscles          Cardiac: regular rhythm, normal S1/S2, no S3 or S4, apical impulse not displaced, no murmurs, gallops or rubs                  Abdomen:  abdomen soft, non-tender, BS normoactive, no mass, no HSM, no bruits        Vascular: pulses full and equal, no bruits auscultated                                        Extremities and Back:  no deformities, clubbing, cyanosis, erythema observed              Neurological:  affect appropriate, oriented to time, person and place                Ignacio Garduno MD  [address]  [place] [id number]              "

## 2017-09-11 NOTE — MR AVS SNAPSHOT
After Visit Summary   9/11/2017    Ricki Blanchard    MRN: [id number]           Patient Information     Date Of Birth          1965        Visit Information        Provider Department      9/11/2017 8:15 AM Wil Shelley MD [facility] HEART [facility]        Today's Diagnoses     Paroxysmal supraventricular tachycardia (H)    -  1    Ventricular tachycardia (H)           Follow-ups after your visit        Your next 10 appointments already scheduled     Sep 25, 2017 10:30 AM CDT   Ep 90 Minute with [id number]   [facility] ([facility])    [address]  [place] [address]   [phone]              Future tests that were ordered for you today     Open Future Orders        Priority Expected Expires Ordered    EP Ablation/ EP Studies Routine 9/11/2017 9/11/2018 9/11/2017            Who to contact     If you have questions or need follow up information about today's clinic visit or your schedule please contact [facility] directly at [phone].  Normal or non-critical lab and imaging results will be communicated to you by That's Solarhart, letter or phone within 4 business days after the clinic has received the results. If you do not hear from us within 7 days, please contact the clinic through That's Solarhart or phone. If you have a critical or abnormal lab result, we will notify you by phone as soon as possible.  Submit refill requests through [employer] or call your pharmacy and they will forward the refill request to us. Please allow 3 business days for your refill to be completed.          Additional Information About Your Visit        That's Solarhart Information     [employer] gives you secure access to your electronic health record. If you see a primary care provider, you can also send messages to your care team and make appointments. If you have questions, please call your primary care clinic.  If  "you do not have a primary care provider, please call [phone] and they will assist you.        Care EveryWhere ID     This is your Care EveryWhere ID. This could be used by other organizations to access your [place] medical records  [id number]        Your Vitals Were     Pulse Height BMI (Body Mass Index)             60 1.88 m (6' 2\") 33.13 kg/m2          Blood Pressure from Last 3 Encounters:   09/11/17 110/70   08/31/17 (!) 154/102   08/28/17 131/75    Weight from Last 3 Encounters:   09/11/17 117 kg (258 lb)   08/31/17 117.3 kg (258 lb 9.6 oz)   08/24/17 117.2 kg (258 lb 4.8 oz)              We Performed the Following     Follow-Up with Electrophysiologist        Primary Care Provider Office Phone # Fax #    Ibrahima Campbell [phone]       [address] [place] [address]        Equal Access to Services     SHERI DUNCAN : Hadii aad ku hadasho Soomaali, waaxda luqadaha, qaybta kaalmada adeegyada, waxay idiin haytello shah . So [facility] [phone].    ATENCIÓN: Si habla español, tiene a mclean disposición servicios gratuitos de asistencia lingüística. [facility] [phone].    We comply with applicable federal civil rights laws and [place] laws. We do not discriminate on the basis of race, color, national origin, age, disability sex, sexual orientation or gender identity.            Thank you!     Thank you for choosing [facility]YSIC[facility]ANS HE[facility] AT [place]  for your care. Our goal is always to provide you with excellent care. Hearing back from our patients is one way we can continue to improve our services. Please take a few minutes to complete the written survey that you may receive in the mail after your visit with us. Thank you!             Your Updated Medication List - Protect others around you: Learn how to safely use, store and throw away your medicines at www.disposemymeds.org.          This list is accurate as of: 9/11/17  8:51 AM.  Always use " your most recent med list.                   Brand Name Dispense Instructions for use Diagnosis    ADVIL 200 MG capsule   Generic drug:  ibuprofen      Take 200 mg by mouth as needed for fever
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
## 2017-09-22 DIAGNOSIS — I47.10 PAROXYSMAL SUPRAVENTRICULAR TACHYCARDIA (H): Primary | ICD-10-CM

## 2017-09-22 RX ORDER — LIDOCAINE 40 MG/G
CREAM TOPICAL
Status: CANCELLED | OUTPATIENT
Start: 2017-09-22

## 2017-09-22 RX ORDER — SODIUM CHLORIDE 450 MG/100ML
INJECTION, SOLUTION INTRAVENOUS CONTINUOUS
Status: CANCELLED | OUTPATIENT
Start: 2017-09-22

## 2017-09-25 ENCOUNTER — APPOINTMENT (OUTPATIENT)
Dept: CARDIOLOGY | Facility: CLINIC | Age: 52
End: 2017-09-25
Attending: INTERNAL MEDICINE
Payer: COMMERCIAL

## 2017-09-25 ENCOUNTER — HOSPITAL ENCOUNTER (OUTPATIENT)
Facility: CLINIC | Age: 52
Discharge: HOME | End: 2017-09-25
Attending: INTERNAL MEDICINE | Admitting: INTERNAL MEDICINE
Payer: COMMERCIAL

## 2017-09-25 VITALS
HEART RATE: 65 BPM | DIASTOLIC BLOOD PRESSURE: 74 MMHG | SYSTOLIC BLOOD PRESSURE: 137 MMHG | HEIGHT: 73 IN | TEMPERATURE: 97.8 F | OXYGEN SATURATION: 96 % | BODY MASS INDEX: 34.3 KG/M2 | WEIGHT: 258.8 LBS | RESPIRATION RATE: 16 BRPM

## 2017-09-25 DIAGNOSIS — I47.10 PAROXYSMAL SUPRAVENTRICULAR TACHYCARDIA (H): ICD-10-CM

## 2017-09-25 LAB
ANION GAP SERPL CALCULATED.3IONS-SCNC: 7 MMOL/L (ref 3–14)
BUN SERPL-MCNC: 9 MG/DL (ref 7–30)
CALCIUM SERPL-MCNC: 9.1 MG/DL (ref 8.5–10.1)
CHLORIDE SERPL-SCNC: 105 MMOL/L (ref 94–109)
CO2 SERPL-SCNC: 27 MMOL/L (ref 20–32)
CREAT SERPL-MCNC: 0.97 MG/DL (ref 0.66–1.25)
ERYTHROCYTE [DISTWIDTH] IN BLOOD BY AUTOMATED COUNT: 12.6 % (ref 10–15)
GFR SERPL CREATININE-BSD FRML MDRD: 81 ML/MIN/1.7M2
GLUCOSE SERPL-MCNC: 155 MG/DL (ref 70–99)
HCT VFR BLD AUTO: 47.4 % (ref 40–53)
HGB BLD-MCNC: 17.3 G/DL (ref 13.3–17.7)
MCH RBC QN AUTO: 31.3 PG (ref 26.5–33)
MCHC RBC AUTO-ENTMCNC: 36.5 G/DL (ref 31.5–36.5)
MCV RBC AUTO: 86 FL (ref 78–100)
PLATELET # BLD AUTO: 224 10E9/L (ref 150–450)
POTASSIUM SERPL-SCNC: 4.8 MMOL/L (ref 3.4–5.3)
RBC # BLD AUTO: 5.52 10E12/L (ref 4.4–5.9)
SODIUM SERPL-SCNC: 139 MMOL/L (ref 133–144)
WBC # BLD AUTO: 7.4 10E9/L (ref 4–11)

## 2017-09-25 PROCEDURE — 25000125 ZZHC RX 250: Performed by: INTERNAL MEDICINE

## 2017-09-25 PROCEDURE — 27210795 ZZH PAD DEFIB QUICK CR4

## 2017-09-25 PROCEDURE — 40000235 ZZH STATISTIC TELEMETRY

## 2017-09-25 PROCEDURE — C1732 CATH, EP, DIAG/ABL, 3D/VECT: HCPCS

## 2017-09-25 PROCEDURE — 93005 ELECTROCARDIOGRAM TRACING: CPT

## 2017-09-25 PROCEDURE — 25000132 ZZH RX MED GY IP 250 OP 250 PS 637: Performed by: INTERNAL MEDICINE

## 2017-09-25 PROCEDURE — 36415 COLL VENOUS BLD VENIPUNCTURE: CPT | Performed by: INTERNAL MEDICINE

## 2017-09-25 PROCEDURE — 3E033KZ INTRODUCTION OF OTHER DIAGNOSTIC SUBSTANCE INTO PERIPHERAL VEIN, PERCUTANEOUS APPROACH: ICD-10-PCS | Performed by: INTERNAL MEDICINE

## 2017-09-25 PROCEDURE — 02K83ZZ MAP CONDUCTION MECHANISM, PERCUTANEOUS APPROACH: ICD-10-PCS | Performed by: INTERNAL MEDICINE

## 2017-09-25 PROCEDURE — 93623 PRGRMD STIMJ&PACG IV RX NFS: CPT

## 2017-09-25 PROCEDURE — 80048 BASIC METABOLIC PNL TOTAL CA: CPT | Performed by: INTERNAL MEDICINE

## 2017-09-25 PROCEDURE — 4A023FZ MEASUREMENT OF CARDIAC RHYTHM, PERCUTANEOUS APPROACH: ICD-10-PCS | Performed by: INTERNAL MEDICINE

## 2017-09-25 PROCEDURE — 27210782 ZZH KIT EP TOTES DISP CR7

## 2017-09-25 PROCEDURE — 27210796 ZZH PAD EXTRNAL REFRENCE CARDIAC MAPPING CR14

## 2017-09-25 PROCEDURE — 93613 INTRACARDIAC EPHYS 3D MAPG: CPT | Performed by: INTERNAL MEDICINE

## 2017-09-25 PROCEDURE — 25000128 H RX IP 250 OP 636: Performed by: INTERNAL MEDICINE

## 2017-09-25 PROCEDURE — 40000852 ZZH STATISTIC HEART CATH LAB OR EP LAB

## 2017-09-25 PROCEDURE — 93653 COMPRE EP EVAL TX SVT: CPT | Performed by: INTERNAL MEDICINE

## 2017-09-25 PROCEDURE — 99152 MOD SED SAME PHYS/QHP 5/>YRS: CPT

## 2017-09-25 PROCEDURE — C1730 CATH, EP, 19 OR FEW ELECT: HCPCS

## 2017-09-25 PROCEDURE — 4A0234Z MEASUREMENT OF CARDIAC ELECTRICAL ACTIVITY, PERCUTANEOUS APPROACH: ICD-10-PCS | Performed by: INTERNAL MEDICINE

## 2017-09-25 PROCEDURE — 93623 PRGRMD STIMJ&PACG IV RX NFS: CPT | Mod: 26 | Performed by: INTERNAL MEDICINE

## 2017-09-25 PROCEDURE — 93010 ELECTROCARDIOGRAM REPORT: CPT | Mod: 76 | Performed by: INTERNAL MEDICINE

## 2017-09-25 PROCEDURE — 99153 MOD SED SAME PHYS/QHP EA: CPT

## 2017-09-25 PROCEDURE — 85027 COMPLETE CBC AUTOMATED: CPT | Performed by: INTERNAL MEDICINE

## 2017-09-25 PROCEDURE — 93621 COMP EP EVL L PAC&REC C SINS: CPT | Mod: 26 | Performed by: INTERNAL MEDICINE

## 2017-09-25 PROCEDURE — 93621 COMP EP EVL L PAC&REC C SINS: CPT

## 2017-09-25 PROCEDURE — 93653 COMPRE EP EVAL TX SVT: CPT

## 2017-09-25 PROCEDURE — 93613 INTRACARDIAC EPHYS 3D MAPG: CPT

## 2017-09-25 PROCEDURE — 02573ZZ DESTRUCTION OF LEFT ATRIUM, PERCUTANEOUS APPROACH: ICD-10-PCS | Performed by: INTERNAL MEDICINE

## 2017-09-25 PROCEDURE — 99152 MOD SED SAME PHYS/QHP 5/>YRS: CPT | Mod: 59 | Performed by: INTERNAL MEDICINE

## 2017-09-25 RX ORDER — DIPHENHYDRAMINE HYDROCHLORIDE 50 MG/ML
25-50 INJECTION INTRAMUSCULAR; INTRAVENOUS
Status: DISCONTINUED | OUTPATIENT
Start: 2017-09-25 | End: 2017-09-25 | Stop reason: HOSPADM

## 2017-09-25 RX ORDER — LIDOCAINE HYDROCHLORIDE 10 MG/ML
10-30 INJECTION, SOLUTION EPIDURAL; INFILTRATION; INTRACAUDAL; PERINEURAL
Status: DISCONTINUED | OUTPATIENT
Start: 2017-09-25 | End: 2017-09-25 | Stop reason: HOSPADM

## 2017-09-25 RX ORDER — PROTAMINE SULFATE 10 MG/ML
1-5 INJECTION, SOLUTION INTRAVENOUS
Status: DISCONTINUED | OUTPATIENT
Start: 2017-09-25 | End: 2017-09-25 | Stop reason: HOSPADM

## 2017-09-25 RX ORDER — LIDOCAINE HYDROCHLORIDE AND EPINEPHRINE 10; 10 MG/ML; UG/ML
10-30 INJECTION, SOLUTION INFILTRATION; PERINEURAL
Status: DISCONTINUED | OUTPATIENT
Start: 2017-09-25 | End: 2017-09-25 | Stop reason: HOSPADM

## 2017-09-25 RX ORDER — PROMETHAZINE HYDROCHLORIDE 25 MG/ML
6.25-25 INJECTION, SOLUTION INTRAMUSCULAR; INTRAVENOUS EVERY 4 HOURS PRN
Status: DISCONTINUED | OUTPATIENT
Start: 2017-09-25 | End: 2017-09-25 | Stop reason: HOSPADM

## 2017-09-25 RX ORDER — IBUTILIDE FUMARATE 1 MG/10ML
1 INJECTION, SOLUTION INTRAVENOUS
Status: DISCONTINUED | OUTPATIENT
Start: 2017-09-25 | End: 2017-09-25 | Stop reason: HOSPADM

## 2017-09-25 RX ORDER — SODIUM CHLORIDE 450 MG/100ML
INJECTION, SOLUTION INTRAVENOUS CONTINUOUS
Status: DISCONTINUED | OUTPATIENT
Start: 2017-09-25 | End: 2017-09-25 | Stop reason: HOSPADM

## 2017-09-25 RX ORDER — MORPHINE SULFATE 2 MG/ML
1-2 INJECTION, SOLUTION INTRAMUSCULAR; INTRAVENOUS EVERY 5 MIN PRN
Status: DISCONTINUED | OUTPATIENT
Start: 2017-09-25 | End: 2017-09-25 | Stop reason: HOSPADM

## 2017-09-25 RX ORDER — LORAZEPAM 2 MG/ML
.5-2 INJECTION INTRAMUSCULAR EVERY 10 MIN PRN
Status: DISCONTINUED | OUTPATIENT
Start: 2017-09-25 | End: 2017-09-25 | Stop reason: HOSPADM

## 2017-09-25 RX ORDER — ONDANSETRON 2 MG/ML
4 INJECTION INTRAMUSCULAR; INTRAVENOUS EVERY 4 HOURS PRN
Status: DISCONTINUED | OUTPATIENT
Start: 2017-09-25 | End: 2017-09-25 | Stop reason: HOSPADM

## 2017-09-25 RX ORDER — FUROSEMIDE 10 MG/ML
20-100 INJECTION INTRAMUSCULAR; INTRAVENOUS
Status: DISCONTINUED | OUTPATIENT
Start: 2017-09-25 | End: 2017-09-25 | Stop reason: HOSPADM

## 2017-09-25 RX ORDER — FLUMAZENIL 0.1 MG/ML
0.2 INJECTION, SOLUTION INTRAVENOUS
Status: DISCONTINUED | OUTPATIENT
Start: 2017-09-25 | End: 2017-09-25 | Stop reason: HOSPADM

## 2017-09-25 RX ORDER — IBUTILIDE FUMARATE 1 MG/10ML
0.01 INJECTION, SOLUTION INTRAVENOUS
Status: DISCONTINUED | OUTPATIENT
Start: 2017-09-25 | End: 2017-09-25 | Stop reason: HOSPADM

## 2017-09-25 RX ORDER — BUPIVACAINE HYDROCHLORIDE 2.5 MG/ML
10-30 INJECTION, SOLUTION EPIDURAL; INFILTRATION; INTRACAUDAL
Status: DISCONTINUED | OUTPATIENT
Start: 2017-09-25 | End: 2017-09-25 | Stop reason: HOSPADM

## 2017-09-25 RX ORDER — LIDOCAINE 40 MG/G
CREAM TOPICAL
Status: DISCONTINUED | OUTPATIENT
Start: 2017-09-25 | End: 2017-09-25 | Stop reason: HOSPADM

## 2017-09-25 RX ORDER — HEPARIN SODIUM 1000 [USP'U]/ML
1000-10000 INJECTION, SOLUTION INTRAVENOUS; SUBCUTANEOUS EVERY 5 MIN PRN
Status: DISCONTINUED | OUTPATIENT
Start: 2017-09-25 | End: 2017-09-25 | Stop reason: HOSPADM

## 2017-09-25 RX ORDER — NALOXONE HYDROCHLORIDE 0.4 MG/ML
0.4 INJECTION, SOLUTION INTRAMUSCULAR; INTRAVENOUS; SUBCUTANEOUS EVERY 5 MIN PRN
Status: DISCONTINUED | OUTPATIENT
Start: 2017-09-25 | End: 2017-09-25 | Stop reason: HOSPADM

## 2017-09-25 RX ORDER — DOBUTAMINE HYDROCHLORIDE 200 MG/100ML
5-40 INJECTION INTRAVENOUS CONTINUOUS PRN
Status: DISCONTINUED | OUTPATIENT
Start: 2017-09-25 | End: 2017-09-25 | Stop reason: HOSPADM

## 2017-09-25 RX ORDER — NALOXONE HYDROCHLORIDE 0.4 MG/ML
.1-.4 INJECTION, SOLUTION INTRAMUSCULAR; INTRAVENOUS; SUBCUTANEOUS
Status: DISCONTINUED | OUTPATIENT
Start: 2017-09-25 | End: 2017-09-25 | Stop reason: HOSPADM

## 2017-09-25 RX ORDER — ATROPINE SULFATE 0.1 MG/ML
.5-1 INJECTION INTRAVENOUS
Status: DISCONTINUED | OUTPATIENT
Start: 2017-09-25 | End: 2017-09-25 | Stop reason: HOSPADM

## 2017-09-25 RX ORDER — KETOROLAC TROMETHAMINE 30 MG/ML
15-30 INJECTION, SOLUTION INTRAMUSCULAR; INTRAVENOUS
Status: DISCONTINUED | OUTPATIENT
Start: 2017-09-25 | End: 2017-09-25 | Stop reason: HOSPADM

## 2017-09-25 RX ORDER — FENTANYL CITRATE 50 UG/ML
25-50 INJECTION, SOLUTION INTRAMUSCULAR; INTRAVENOUS
Status: DISCONTINUED | OUTPATIENT
Start: 2017-09-25 | End: 2017-09-25 | Stop reason: HOSPADM

## 2017-09-25 RX ORDER — PROTAMINE SULFATE 10 MG/ML
5-40 INJECTION, SOLUTION INTRAVENOUS EVERY 10 MIN PRN
Status: DISCONTINUED | OUTPATIENT
Start: 2017-09-25 | End: 2017-09-25 | Stop reason: HOSPADM

## 2017-09-25 RX ORDER — ADENOSINE 3 MG/ML
6-12 INJECTION, SOLUTION INTRAVENOUS EVERY 5 MIN PRN
Status: DISCONTINUED | OUTPATIENT
Start: 2017-09-25 | End: 2017-09-25 | Stop reason: HOSPADM

## 2017-09-25 RX ADMIN — FENTANYL CITRATE 100 MCG: 50 INJECTION, SOLUTION INTRAMUSCULAR; INTRAVENOUS at 12:14

## 2017-09-25 RX ADMIN — Medication 3 MCG/MIN: at 12:05

## 2017-09-25 RX ADMIN — ACETAMINOPHEN AND CODEINE PHOSPHATE 1 TABLET: 300; 30 TABLET ORAL at 13:00

## 2017-09-25 RX ADMIN — Medication 3 MCG/MIN: at 11:39

## 2017-09-25 RX ADMIN — MIDAZOLAM HYDROCHLORIDE 5 MG: 1 INJECTION, SOLUTION INTRAMUSCULAR; INTRAVENOUS at 12:15

## 2017-09-25 NOTE — DISCHARGE INSTRUCTIONS
SVT Ablation Discharge Instructions - Femoral     After you go home:      Have an adult stay with you until tomorrow.    You may resume your normal diet.       For 24 hours - due to the sedation you received:    Relax and take it easy.    Do NOT make any important or legal decisions.    Do NOT drive or operate machines at home or at work.    Do NOT drink alcohol.    Care of Groin Puncture Site:      For the first 24 hrs - check the puncture site every 1-2 hours while awake.    For 2 days, when you cough, sneeze, laugh or move your bowels, hold your hand over the puncture site and press firmly.    Remove the bandaid after 24 hours. If there is minor oozing, apply another bandaid and remove it after 12 hours.    It is normal to have a small bruise or pea size lump at the site.    You may shower tomorrow.  Do NOT take a bath, or use a hot tub or pool for at least 3 days. Do NOT scrub the site. Do not use lotion or powder near the puncture site.    Activity:            For 2 days:    No stooping or squatting    Do NOT do any heavy activity such as exercise, lifting, or straining.     No housework, yard work or any activity that make you sweat    Do NOT lift more than 10 pounds    Bleeding:      If you start bleeding from the site in your groin, lie down flat and press firmly on the site for 10 minutes.     Once bleeding stops, lay flat for 2 hours.    Call [facility] as soon as you can.       Call 911 right away if you have heavy bleeding or bleeding that does not stop.      Medicines:      Take your medications, including blood thinners, unless your provider tells you not to.    If you have stopped any medicines, check with your provider about when to restart them.    If you have pain or shortness of breath, you may take Advil (ibuprofen) or Tylenol (acetaminophen).    Follow Up Appointments:      Follow up with Device Clinic at [facility] of patient preference in 7-10 days.    Call the clinic if:      You  have increased pain or a large or growing hard lump around the site.    The site is red, swollen, hot or tender.    Blood or fluid is draining from the site.    You have chills or a fever greater than 101 F (38 C).    Your leg feels numb, cool or changes color.    Increased pain in the chest and/or groin.    Increased shortness of breath    Chest pain not relieved by Tylenol or Advil    New pain in the back or belly that you cannot control with Tylenol.    Recurrent irregular or fast heart rate lasting over 2 hours.    Any questions or concerns.    Heart rhythms:    You may have some irregular heartbeats. These feel very strong. They may make you feel that the fast heart rhythm is going to start again.  Give it time. The irregular beats should occur less often.       [facility] at [place]:            [phone] [facility] (7 days a week)

## 2017-09-25 NOTE — PROGRESS NOTES
Pre procedure instructions reviewed, questions answered, pt states to understand, ready for procedure.

## 2017-09-25 NOTE — PROGRESS NOTES
1310 Report received from Ludy Fernandez RN.  1315 Bandaid CDI to right groin puncture sites. No oozing or hematoma noted. Area soft & flat. Pt denies pain. Activity restrictions while on bedrest reinforced with pt. Verbal understanding received from pt. Pt's wife at bedside. Detailed update given.   1325 Pt taking diet & flds well. No complaints.  1410 Report given to Ludy Fernandez RN.

## 2017-09-25 NOTE — PROGRESS NOTES
Pt up ambulating in halls, right groin site c/d/i with dressing no hematoma,VSS, pt denies pain,pt discharged to home with wife.

## 2017-09-25 NOTE — PROGRESS NOTES
Post procedure instructions reviewed with pt, questions answered and pt and wife state and appear to understand.

## 2017-09-25 NOTE — IP AVS SNAPSHOT
MRN:[id number]                      After Visit Summary   9/25/2017    Ricki Blanchard    MRN: [id number]           Visit Information        Department      9/25/2017  8:36 AM [facility]          Review of your medicines      CONTINUE these medicines which have NOT CHANGED        Dose / Directions    ADVIL 200 MG capsule   Generic drug:  ibuprofen        Dose:  200 mg   Take 200 mg by mouth as needed for fever   Refills:  0                Protect others around you: Learn how to safely use, store and throw away your medicines at www.disposemymeds.org.         Follow-ups after your visit         Care Instructions        Further instructions from your care team       SVT Ablation Discharge Instructions - Femoral     After you go home:      Have an adult stay with you until tomorrow.    You may resume your normal diet.       For 24 hours - due to the sedation you received:    Relax and take it easy.    Do NOT make any important or legal decisions.    Do NOT drive or operate machines at home or at work.    Do NOT drink alcohol.    Care of Groin Puncture Site:      For the first 24 hrs - check the puncture site every 1-2 hours while awake.    For 2 days, when you cough, sneeze, laugh or move your bowels, hold your hand over the puncture site and press firmly.    Remove the bandaid after 24 hours. If there is minor oozing, apply another bandaid and remove it after 12 hours.    It is normal to have a small bruise or pea size lump at the site.    You may shower tomorrow.  Do NOT take a bath, or use a hot tub or pool for at least 3 days. Do NOT scrub the site. Do not use lotion or powder near the puncture site.    Activity:            For 2 days:    No stooping or squatting    Do NOT do any heavy activity such as exercise, lifting, or straining.     No housework, yard work or any activity that make you sweat    Do NOT lift more than 10 pounds    Bleeding:      If you start bleeding from  the site in your groin, lie down flat and press firmly on the site for 10 minutes.     Once bleeding stops, lay flat for 2 hours.    Call [facility] as soon as you can.       Call 911 right away if you have heavy bleeding or bleeding that does not stop.      Medicines:      Take your medications, including blood thinners, unless your provider tells you not to.    If you have stopped any medicines, check with your provider about when to restart them.    If you have pain or shortness of breath, you may take Advil (ibuprofen) or Tylenol (acetaminophen).    Follow Up Appointments:      Follow up with Device Clinic at [facility] of patient preference in 7-10 days.    Call the clinic if:      You have increased pain or a large or growing hard lump around the site.    The site is red, swollen, hot or tender.    Blood or fluid is draining from the site.    You have chills or a fever greater than 101 F (38 C).    Your leg feels numb, cool or changes color.    Increased pain in the chest and/or groin.    Increased shortness of breath    Chest pain not relieved by Tylenol or Advil    New pain in the back or belly that you cannot control with Tylenol.    Recurrent irregular or fast heart rate lasting over 2 hours.    Any questions or concerns.    Heart rhythms:    You may have some irregular heartbeats. These feel very strong. They may make you feel that the fast heart rhythm is going to start again.  Give it time. The irregular beats should occur less often.       [facility] at [place]:            [phone] [facility] (7 days a week)           Additional Information About Your Visit        MedigramharMyGoodPoints Information     Cebix gives you secure access to your electronic health record. If you see a primary care provider, you can also send messages to your care team and make appointments. If you have questions, please call your primary care clinic.  If you do not have a primary care provider,  "please call [phone] and they will assist you.        Care EveryWhere ID     This is your Care EveryWhere ID. This could be used by other organizations to access your [facility] medical records  [id number]        Your Vitals Were     Blood Pressure Pulse Temperature Respirations Height Weight    138/98 65 97.8  F (36.6  C) (Oral) 18 1.854 m (6' 1\") 117.4 kg (258 lb 12.8 oz)    Pulse Oximetry BMI (Body Mass Index)                97% 34.14 kg/m2           Primary Care Provider Office Phone # Fax #    Ibrahima Campbell [phone]      Equal Access to Services     [facility] JOSE : Hadii donte Markham, waaxda tinoadaha, qaybta kaalmada filemon, mike parkinson. So [facility] [phone].    ATENCIÓN: Si habla español, tiene a mclean disposición servicios gratuitos de asistencia lingüística. Llame al [phone].    We comply with applicable federal civil rights laws and [place] laws. We do not discriminate on the basis of race, color, national origin, age, disability sex, sexual orientation or gender identity.            Thank you!     Thank you for choosing [facility] for your care. Our goal is always to provide you with excellent care. Hearing back from our patients is one way we can continue to improve our services. Please take a few minutes to complete the written survey that you may receive in the mail after you visit with us. Thank you!             Medication List: This is a list of all your medications and when to take them. Check marks below indicate your daily home schedule. Keep this list as a reference.      Medications           Morning Afternoon Evening Bedtime As Needed    ADVIL 200 MG capsule   Take 200 mg by mouth as needed for fever   Generic drug:  ibuprofen                                  "

## 2017-09-26 ENCOUNTER — TELEPHONE (OUTPATIENT)
Dept: CARDIOLOGY | Facility: CLINIC | Age: 52
End: 2017-09-26

## 2017-09-26 DIAGNOSIS — I47.10 SVT (SUPRAVENTRICULAR TACHYCARDIA) (H): Primary | ICD-10-CM

## 2017-09-26 NOTE — PROGRESS NOTES
Called patient to follow up post SVT ablation.  He is feeling well without complaints.  Has questions regarding RTW, follow up and STD paperwork.  I will review plan for follow up with Dr. Shelley and fill out paperwork for patient.    Reviewed with Dr. Shelley.  ANGELIA follow up 4-6 weeks ordered.  Paperwork for RTW 10/25 faxed to [employer] company and mailed to patient.

## 2017-09-27 LAB — INTERPRETATION ECG - MUSE: NORMAL

## 2017-10-24 ENCOUNTER — OFFICE VISIT (OUTPATIENT)
Dept: CARDIOLOGY | Facility: CLINIC | Age: 52
End: 2017-10-24
Payer: COMMERCIAL

## 2017-10-24 VITALS
HEIGHT: 74 IN | SYSTOLIC BLOOD PRESSURE: 158 MMHG | HEART RATE: 64 BPM | BODY MASS INDEX: 34.14 KG/M2 | WEIGHT: 266 LBS | DIASTOLIC BLOOD PRESSURE: 100 MMHG

## 2017-10-24 DIAGNOSIS — I47.10 SVT (SUPRAVENTRICULAR TACHYCARDIA) (H): ICD-10-CM

## 2017-10-24 PROCEDURE — 99213 OFFICE O/P EST LOW 20 MIN: CPT | Performed by: NURSE PRACTITIONER
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
## 2017-10-24 NOTE — MR AVS SNAPSHOT
"              After Visit Summary   10/24/2017    Ricki Blanchard    MRN: [id number]           Patient Information     Date Of Birth          1965        Visit Information        Provider Department      10/24/2017 2:30 PM Gisele Santos APRN CNP [facility]        Today's Diagnoses     SVT (supraventricular tachycardia) (H)           Follow-ups after your visit        Who to contact     If you have questions or need follow up information about today's clinic visit or your schedule please contact [facility] directly at [phone].  Normal or non-critical lab and imaging results will be communicated to you by Assemblagehart, letter or phone within 4 business days after the clinic has received the results. If you do not hear from us within 7 days, please contact the clinic through Up [employer] Nett or phone. If you have a critical or abnormal lab result, we will notify you by phone as soon as possible.  Submit refill requests through COM DEV or call your pharmacy and they will forward the refill request to us. Please allow 3 business days for your refill to be completed.          Additional Information About Your Visit        MyChart Information     COM DEV gives you secure access to your electronic health record. If you see a primary care provider, you can also send messages to your care team and make appointments. If you have questions, please call your primary care clinic.  If you do not have a primary care provider, please call [phone] and they will assist you.        Care EveryWhere ID     This is your Care EveryWhere ID. This could be used by other organizations to access your [facility] medical records  [id number]        Your Vitals Were     Pulse Height BMI (Body Mass Index)             64 1.88 m (6' 2\") 34.15 kg/m2          Blood Pressure from Last 3 Encounters:   10/24/17 (!) 158/100   09/25/17 137/74   09/11/17 " 110/70    Weight from Last 3 Encounters:   10/24/17 120.7 kg (266 lb)   09/25/17 117.4 kg (258 lb 12.8 oz)   09/11/17 117 kg (258 lb)              We Performed the Following     Follow-Up with Cardiac Advanced Practice Provider        Primary Care Provider Office Phone # Fax #    Ibrahima Campbell [phone]       [address] [facility] [address]        Equal Access to Services     SHERI DUNCAN : Hadii aad ku hadasho Soomaali, waaxda luqadaha, qaybta kaalmada adeegyada, waxay idiin hayaan adeeg kharash la'aan . So [facility] [phone].    ATENCIÓN: Si habla español, tiene a mclean disposición servicios gratuitos de asistencia lingüística. [facility] [phone].    We comply with applicable federal civil rights laws and [place] laws. We do not discriminate on the basis of race, color, national origin, age, disability, sex, sexual orientation, or gender identity.            Thank you!     Thank you for choosing [facility] [place]  for your care. Our goal is always to provide you with excellent care. Hearing back from our patients is one way we can continue to improve our services. Please take a few minutes to complete the written survey that you may receive in the mail after your visit with us. Thank you!             Your Updated Medication List - Protect others around you: Learn how to safely use, store and throw away your medicines at www.disposemymeds.org.          This list is accurate as of: 10/24/17  4:10 PM.  Always use your most recent med list.                   Brand Name Dispense Instructions for use Diagnosis    ADVIL 200 MG capsule   Generic drug:  ibuprofen      Take 200 mg by mouth as needed for fever
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
## 2017-10-26 ENCOUNTER — DOCUMENTATION ONLY (OUTPATIENT)
Dept: CARDIOLOGY | Facility: CLINIC | Age: 52
End: 2017-10-26

## 2017-10-26 NOTE — PROGRESS NOTES
[place] Dept of Public Safety form and letter composed and signed by Dr. Shelley faxed to [phone]. Copy of letter mailed to patient as well. Gimla

## 2017-11-04 ENCOUNTER — MYC MEDICAL ADVICE (OUTPATIENT)
Dept: INTERNAL MEDICINE | Facility: CLINIC | Age: 52
End: 2017-11-04

## 2018-02-23 ENCOUNTER — OFFICE VISIT (OUTPATIENT)
Dept: INTERNAL MEDICINE | Facility: CLINIC | Age: 53
End: 2018-02-23
Payer: COMMERCIAL

## 2018-02-23 VITALS
WEIGHT: 251.3 LBS | BODY MASS INDEX: 32.25 KG/M2 | TEMPERATURE: 98.2 F | DIASTOLIC BLOOD PRESSURE: 90 MMHG | OXYGEN SATURATION: 98 % | HEART RATE: 59 BPM | SYSTOLIC BLOOD PRESSURE: 124 MMHG | HEIGHT: 74 IN

## 2018-02-23 DIAGNOSIS — H61.92 SKIN LESION OF LEFT EAR: ICD-10-CM

## 2018-02-23 DIAGNOSIS — H61.23 BILATERAL IMPACTED CERUMEN: Primary | ICD-10-CM

## 2018-02-23 PROCEDURE — 69209 REMOVE IMPACTED EAR WAX UNI: CPT | Mod: 50 | Performed by: PHYSICIAN ASSISTANT

## 2018-02-23 PROCEDURE — 99213 OFFICE O/P EST LOW 20 MIN: CPT | Mod: 25 | Performed by: PHYSICIAN ASSISTANT

## 2018-02-23 NOTE — MR AVS SNAPSHOT
"              After Visit Summary   2/23/2018    Ricki Blanchard    MRN: [id number]           Patient Information     Date Of Birth          1965        Visit Information        Provider Department      2/23/2018 10:00 AM Mariajose Lu PA-C [facility]        Today's Diagnoses     Bilateral impacted cerumen    -  1    Skin lesion of left ear           Follow-ups after your visit        Who to contact     If you have questions or need follow up information about today's clinic visit or your schedule please contact [facility] directly at [phone].  Normal or non-critical lab and imaging results will be communicated to you by MyChart, letter or phone within 4 business days after the clinic has received the results. If you do not hear from us within 7 days, please contact the clinic through [employer] or phone. If you have a critical or abnormal lab result, we will notify you by phone as soon as possible.  Submit refill requests through "[employer] or call your pharmacy and they will forward the refill request to us. Please allow 3 business days for your refill to be completed.          Additional Information About Your Visit        MyChart Information     "[employer] gives you secure access to your electronic health record. If you see a primary care provider, you can also send messages to your care team and make appointments. If you have questions, please call your primary care clinic.  If you do not have a primary care provider, please call [phone] and they will assist you.        Care EveryWhere ID     This is your Care EveryWhere ID. This could be used by other organizations to access your [place] medical records  [id number]        Your Vitals Were     Pulse Temperature Height Pulse Oximetry BMI (Body Mass Index)       59 98.2  F (36.8  C) (Oral) 6' 2\" (1.88 m) 98% 32.27 kg/m2        Blood Pressure from Last 3 Encounters:   02/23/18 124/90   10/24/17 " (!) 158/100   09/25/17 137/74    Weight from Last 3 Encounters:   02/23/18 251 lb 4.8 oz (114 kg)   10/24/17 266 lb (120.7 kg)   09/25/17 258 lb 12.8 oz (117.4 kg)              We Performed the Following     HC REMOVAL IMPACTED CERUMEN IRRIGATION/LVG UNILAT        Primary Care Provider Office Phone # Fax #    Ibrahima Campbell [phone]       [address] [facility] [address]        Equal Access to Services     [facility]: Hadii aad ku hadasho Soomaali, waaxda luqadaha, qaybta kaalmada adeegyada, waxay derwin haytello shah . So [facility] [phone].    ATENCIÓN: Si habla español, tiene a mclean disposición servicios gratuitos de asistencia lingüística. [facility] [phone].    We comply with applicable federal civil rights laws and [place] laws. We do not discriminate on the basis of race, color, national origin, age, disability, sex, sexual orientation, or gender identity.            Thank you!     Thank you for choosing [facility]  for your care. Our goal is always to provide you with excellent care. Hearing back from our patients is one way we can continue to improve our services. Please take a few minutes to complete the written survey that you may receive in the mail after your visit with us. Thank you!             Your Updated Medication List - Protect others around you: Learn how to safely use, store and throw away your medicines at www.disposemymeds.org.          This list is accurate as of 2/23/18 10:13 AM.  Always use your most recent med list.                   Brand Name Dispense Instructions for use Diagnosis    ADVIL 200 MG capsule   Generic drug:  ibuprofen      Take 200 mg by mouth as needed for fever

## 2018-02-23 NOTE — PROGRESS NOTES
"  SUBJECTIVE:   Ricki Blanchard is a 52 year old male who presents to clinic today for the following health issues:      Pt states he is here today to get his ears cleaned.   Plugged in the left and right sides    Also - abnormal skin lesion left auricle of the ear. Hx of removal of a lesion in the past.       -------------------------------------    Problem list and histories reviewed & adjusted, as indicated.  Additional history: as documented    Labs reviewed in EPIC    Reviewed and updated as needed this visit by clinical staff  Tobacco  Allergies  Meds       Reviewed and updated as needed this visit by Provider  Allergies  Meds         ROS:  Constitutional, HEENT, cardiovascular, pulmonary, derm systems are negative, except as otherwise noted.    OBJECTIVE:     /90 (BP Location: Left arm, Patient Position: Chair, Cuff Size: Adult Regular)  Pulse 59  Temp 98.2  F (36.8  C) (Oral)  Ht 6' 2\" (1.88 m)  Wt 251 lb 4.8 oz (114 kg)  SpO2 98%  BMI 32.27 kg/m2  Body mass index is 32.27 kg/(m^2).  GENERAL: healthy, alert and no distress  ENT- left and right external ear canals with cerumen  After ear lavage canals are clear  NECK: no adenopathy, no asymmetry, masses, or scars and thyroid normal to palpation  RESP: lungs clear to auscultation - no rales, rhonchi or wheezes  CV: regular rates and rhythm and normal S1 S2, no S3 or S4  SKIN: skin general tan noted.  Other seborrhetic keratosis on the face right temple  Left Auricle with rough raise slightly pigmented area.     Diagnostic Test Results:  none     ASSESSMENT/PLAN:           1. Bilateral impacted cerumen  Ear lavage by staff  - HC REMOVAL IMPACTED CERUMEN IRRIGATION/LVG UNILAT    2. Skin lesion of left ear  Recommend to see dermatology for consult/biopsy           Mariajose Lu PA-C  [facility]    "

## 2018-09-14 ENCOUNTER — OFFICE VISIT (OUTPATIENT)
Dept: INTERNAL MEDICINE | Facility: CLINIC | Age: 53
End: 2018-09-14
Payer: COMMERCIAL

## 2018-09-14 ENCOUNTER — OFFICE VISIT (OUTPATIENT)
Dept: DERMATOLOGY | Facility: CLINIC | Age: 53
End: 2018-09-14
Payer: COMMERCIAL

## 2018-09-14 ENCOUNTER — MYC MEDICAL ADVICE (OUTPATIENT)
Dept: INTERNAL MEDICINE | Facility: CLINIC | Age: 53
End: 2018-09-14

## 2018-09-14 VITALS
BODY MASS INDEX: 32.18 KG/M2 | DIASTOLIC BLOOD PRESSURE: 86 MMHG | HEIGHT: 72 IN | OXYGEN SATURATION: 96 % | SYSTOLIC BLOOD PRESSURE: 154 MMHG | TEMPERATURE: 98.2 F | RESPIRATION RATE: 14 BRPM | HEART RATE: 61 BPM | WEIGHT: 237.6 LBS

## 2018-09-14 VITALS — HEART RATE: 55 BPM | OXYGEN SATURATION: 98 % | SYSTOLIC BLOOD PRESSURE: 130 MMHG | DIASTOLIC BLOOD PRESSURE: 78 MMHG

## 2018-09-14 DIAGNOSIS — L91.8 INFLAMED ACROCHORDON: ICD-10-CM

## 2018-09-14 DIAGNOSIS — D48.5 NEOPLASM OF UNCERTAIN BEHAVIOR OF SKIN: Primary | ICD-10-CM

## 2018-09-14 DIAGNOSIS — E78.5 HYPERLIPIDEMIA LDL GOAL <160: ICD-10-CM

## 2018-09-14 DIAGNOSIS — Z11.4 SCREENING FOR HIV (HUMAN IMMUNODEFICIENCY VIRUS): ICD-10-CM

## 2018-09-14 DIAGNOSIS — N52.9 ERECTILE DYSFUNCTION, UNSPECIFIED ERECTILE DYSFUNCTION TYPE: ICD-10-CM

## 2018-09-14 DIAGNOSIS — Z12.5 SPECIAL SCREENING FOR MALIGNANT NEOPLASM OF PROSTATE: ICD-10-CM

## 2018-09-14 DIAGNOSIS — Z00.00 ROUTINE GENERAL MEDICAL EXAMINATION AT A HEALTH CARE FACILITY: Primary | ICD-10-CM

## 2018-09-14 DIAGNOSIS — Z11.59 NEED FOR HEPATITIS C SCREENING TEST: ICD-10-CM

## 2018-09-14 DIAGNOSIS — R73.03 PREDIABETES: ICD-10-CM

## 2018-09-14 PROBLEM — E11.9 DIABETES MELLITUS, TYPE 2 (H): Status: ACTIVE | Noted: 2018-09-14

## 2018-09-14 LAB
ALBUMIN SERPL-MCNC: 4.3 G/DL (ref 3.4–5)
ALP SERPL-CCNC: 53 U/L (ref 40–150)
ALT SERPL W P-5'-P-CCNC: 33 U/L (ref 0–70)
ANION GAP SERPL CALCULATED.3IONS-SCNC: 5 MMOL/L (ref 3–14)
AST SERPL W P-5'-P-CCNC: 33 U/L (ref 0–45)
BILIRUB SERPL-MCNC: 0.8 MG/DL (ref 0.2–1.3)
BUN SERPL-MCNC: 12 MG/DL (ref 7–30)
CALCIUM SERPL-MCNC: 8.9 MG/DL (ref 8.5–10.1)
CHLORIDE SERPL-SCNC: 101 MMOL/L (ref 94–109)
CHOLEST SERPL-MCNC: 246 MG/DL
CO2 SERPL-SCNC: 29 MMOL/L (ref 20–32)
CREAT SERPL-MCNC: 1.02 MG/DL (ref 0.66–1.25)
GFR SERPL CREATININE-BSD FRML MDRD: 76 ML/MIN/1.7M2
GLUCOSE SERPL-MCNC: 73 MG/DL (ref 70–99)
HBA1C MFR BLD: 6 % (ref 0–5.6)
HDLC SERPL-MCNC: 48 MG/DL
LDLC SERPL CALC-MCNC: 171 MG/DL
NONHDLC SERPL-MCNC: 198 MG/DL
POTASSIUM SERPL-SCNC: 3.8 MMOL/L (ref 3.4–5.3)
PROT SERPL-MCNC: 8 G/DL (ref 6.8–8.8)
PSA SERPL-ACNC: 2.13 UG/L (ref 0–4)
SODIUM SERPL-SCNC: 135 MMOL/L (ref 133–144)
TRIGL SERPL-MCNC: 135 MG/DL

## 2018-09-14 PROCEDURE — 36415 COLL VENOUS BLD VENIPUNCTURE: CPT | Performed by: INTERNAL MEDICINE

## 2018-09-14 PROCEDURE — G0103 PSA SCREENING: HCPCS | Performed by: INTERNAL MEDICINE

## 2018-09-14 PROCEDURE — 99396 PREV VISIT EST AGE 40-64: CPT | Performed by: INTERNAL MEDICINE

## 2018-09-14 PROCEDURE — 87389 HIV-1 AG W/HIV-1&-2 AB AG IA: CPT | Performed by: INTERNAL MEDICINE

## 2018-09-14 PROCEDURE — 88305 TISSUE EXAM BY PATHOLOGIST: CPT | Mod: TC | Performed by: PHYSICIAN ASSISTANT

## 2018-09-14 PROCEDURE — 83036 HEMOGLOBIN GLYCOSYLATED A1C: CPT | Performed by: INTERNAL MEDICINE

## 2018-09-14 PROCEDURE — 80061 LIPID PANEL: CPT | Performed by: INTERNAL MEDICINE

## 2018-09-14 PROCEDURE — 86803 HEPATITIS C AB TEST: CPT | Performed by: INTERNAL MEDICINE

## 2018-09-14 PROCEDURE — 80053 COMPREHEN METABOLIC PANEL: CPT | Performed by: INTERNAL MEDICINE

## 2018-09-14 PROCEDURE — 11200 RMVL SKIN TAGS UP TO&INC 15: CPT | Mod: 51 | Performed by: PHYSICIAN ASSISTANT

## 2018-09-14 PROCEDURE — 11100 HC BIOPSY SKIN/SUBQ/MUC MEM, SINGLE LESION: CPT | Mod: 59 | Performed by: PHYSICIAN ASSISTANT

## 2018-09-14 RX ORDER — SILDENAFIL CITRATE 20 MG/1
20-40 TABLET ORAL DAILY PRN
Qty: 30 TABLET | Refills: 0 | Status: SHIPPED | OUTPATIENT
Start: 2018-09-14 | End: 2020-02-26 | Stop reason: DRUGHIGH

## 2018-09-14 NOTE — MR AVS SNAPSHOT
"              After Visit Summary   9/14/2018    Ricki Blanchard    MRN: [id number]           Patient Information     Date Of Birth          1965        Visit Information        Provider Department      9/14/2018 2:00 PM Ibrahima Campbell MD [facility]        Today's Diagnoses     Routine general medical examination at a health care facility    -  1    Need for hepatitis C screening test        Screening for HIV (human immunodeficiency virus)        Erectile dysfunction, unspecified erectile dysfunction type        Special screening for malignant neoplasm of prostate        Hyperlipidemia LDL goal <160        Prediabetes          Care Instructions      *     5 GOALS TO PREVENT VASCULAR DISEASE:     1.  Aggressive blood pressure control, under 130/80 ideally.  Using medications if needed.    Your blood pressure is under good control    BP Readings from Last 4 Encounters:   09/14/18 130/78   09/14/18 154/86   02/23/18 124/90   10/24/17 (!) 158/100       2.  Aggressive LDL cholesterol (\"bad cholesterol\") lowering as indicated.    Your goal is an LDL under 130 for sure, preferably under 100.  (If you have diabetes or previous vascular disease, the the LDL goals would be under 100 for sure, preferably under 70.)    New guidelines identify four high-risk groups who could benefit from statins:   *people with pre-existing heart disease, such as those who have had a heart attack;   *people ages 40 to 75 who have diabetes of any type  *patients ages 40 to 75 with at least a 7.5% risk of developing cardiovascular disease over the next decade, according to a formula described in the guidelines  *patients with the sort of super-high cholesterol that sometimes runs in families, as evidenced by an LDL of 190 milligrams per deciliter or higher    Your cholesterol levels are well controlled.    Recent Labs   Lab Test  02/01/16   1446  04/01/14   0910  12/05/11   0919   CHOL  256*  267*  261* " "  HDL  41  39*  45   LDL  161*  183*  168*   TRIG  271*  223*  236*   CHOLHDLRATIO   --   6.8*  5.7*       3.  Aggressive diabetic prevention, screening and/or management.      You do not have diabetes as of the most recent blood tests.     4.  No smoking    5.  Consider taking low dose aspirin (81 mg) tablet once per day over the age of 50, every day unless there is a specific reason that you cannot take aspirin (such as side effect, allergy, or you are on another \"blood thinner\").        --Based on your current cardiac risk factors, you should take Aspirin 81 mg once per day if you are over 50 years of age.             Preventive Health Recommendations  Male Ages 50 - 64    Yearly exam:             See your health care provider every year in order to  o   Review health changes.   o   Discuss preventive care.    o   Review your medicines if your doctor has prescribed any.     Have a cholesterol test every 5 years, or more frequently if you are at risk for high cholesterol/heart disease.     Have a diabetes test (fasting glucose) every three years. If you are at risk for diabetes, you should have this test more often.     Have a colonoscopy at age 50, or have a yearly FIT test (stool test). These exams will check for colon cancer.      Talk with your health care provider about whether or not a prostate cancer screening test (PSA) is right for you.    You should be tested each year for STDs (sexually transmitted diseases), if you re at risk.     Shots: Get a flu shot each year. Get a tetanus shot every 10 years.     Nutrition:    Eat at least 5 servings of fruits and vegetables daily.     Eat whole-grain bread, whole-wheat pasta and brown rice instead of white grains and rice.     Get adequate Calcium and Vitamin D.        --Good Grains:  Oats, brown rice, Quinoa (these do not raise the blood sugar as much)     --Bad grains:  Anything made from wheat or white rice     (because these raise the blood sugars " "significantly, and the possible gluten issue from wheat for some people).      --Proteins:  Aim for \"lean proteins\" including chicken, fish, seafood, pork, turkey, and eggs (in moderation); Eat red meat only occasionally            Rashid Abbibrandt:                         Lifestyle    Exercise for at least 150 minutes a week (30 minutes a day, 5 days a week). This will help you control your weight and prevent disease.     Limit alcohol to one drink per day.     No smoking.     Wear sunscreen to prevent skin cancer.     See your dentist every six months for an exam and cleaning.     See your eye doctor every 1 to 2 years.            Follow-ups after your visit        Who to contact     If you have questions or need follow up information about today's clinic visit or your schedule please contact [facility] directly at [phone].  Normal or non-critical lab and imaging results will be communicated to you by [employer]ice[employer]art, letter or phone within 4 business days after the clinic has received the results. If you do not hear from us within 7 days, please contact the clinic through [employer] or phone. If you have a critical or abnormal lab result, we will notify you by phone as soon as possible.  Submit refill requests through [employer] or call your pharmacy and they will forward the refill request to us. Please allow 3 business days for your refill to be completed.          Additional Information About Your Visit        [employer] Information     [employer] gives you secure access to your electronic health record. If you see a primary care provider, you can also send messages to your care team and make appointments. If you have questions, please call your primary care clinic.  If you do not have a primary care provider, please call [phone] and they will assist you.        Care EveryWhere ID     This is your Care EveryWhere ID. This could be used by other organizations to access your [facility]" records  [id number]        Your Vitals Were     Pulse Temperature Respirations Height Pulse Oximetry BMI (Body Mass Index)    61 98.2  F (36.8  C) (Oral) 14 6' (1.829 m) 96% 32.22 kg/m2       Blood Pressure from Last 3 Encounters:   09/14/18 130/78   09/14/18 154/86   02/23/18 124/90    Weight from Last 3 Encounters:   09/14/18 237 lb 9.6 oz (107.8 kg)   02/23/18 251 lb 4.8 oz (114 kg)   10/24/17 266 lb (120.7 kg)              We Performed the Following     Comprehensive metabolic panel     Hemoglobin A1c     Hepatitis C Screen Reflex to HCV RNA Quant and Genotype     HIV Screening     Lipid panel reflex to direct LDL Fasting     PSA, screen          Today's Medication Changes          These changes are accurate as of 9/14/18  3:47 PM.  If you have any questions, ask your nurse or doctor.               Start taking these medicines.        Dose/Directions    sildenafil 20 MG tablet   Commonly known as:  REVATIO   Used for:  Erectile dysfunction, unspecified erectile dysfunction type   Started by:  Ibrahima Campbell MD        Dose:  20-40 mg   Take 1-2 tablets (20-40 mg) by mouth daily as needed Never use with nitroglycerin, terazosin or doxazosin.   Quantity:  30 tablet   Refills:  0            Where to get your medicines      Some of these will need a paper prescription and others can be bought over the counter.  Ask your nurse if you have questions.     Bring a paper prescription for each of these medications     sildenafil 20 MG tablet                Primary Care Provider Office Phone # Fax #    Ibrahima Campbell [phone]       [address] [place] [id number]        Equal Access to Services     [facility]: Hadii aad ku hadasho Soomaali, waaxda luqadaha, qaybta kaalmada adeegyada, mike parkinson. So [facility] [phone].    ATENCIÓN: Si habla español, tiene a mclean disposición servicios gratuitos de asistencia lingüística. Llame al [phone].    We  comply with applicable federal civil rights laws and [place] laws. We do not discriminate on the basis of race, color, national origin, age, disability, sex, sexual orientation, or gender identity.            Thank you!     Thank you for choosing [facility]  for your care. Our goal is always to provide you with excellent care. Hearing back from our patients is one way we can continue to improve our services. Please take a few minutes to complete the written survey that you may receive in the mail after your visit with us. Thank you!             Your Updated Medication List - Protect others around you: Learn how to safely use, store and throw away your medicines at www.disposemymeds.org.          This list is accurate as of 9/14/18  3:47 PM.  Always use your most recent med list.                   Brand Name Dispense Instructions for use Diagnosis    ADVIL 200 MG capsule   Generic drug:  ibuprofen      Take 200 mg by mouth as needed for fever        sildenafil 20 MG tablet    REVATIO    30 tablet    Take 1-2 tablets (20-40 mg) by mouth daily as needed Never use with nitroglycerin, terazosin or doxazosin.    Erectile dysfunction, unspecified erectile dysfunction type

## 2018-09-14 NOTE — LETTER
9/14/2018         RE: Ricki Blanchard  [address] [facility] [address]        Dear Colleague,    Thank you for referring your patient, Ricki Blanchard, to the [facility]. Please see a copy of my visit note below.    HPI:   Ricki Blanchard is a 52 year old male who presents for evaluation of spots on back and left and right abdomen.   chief complaint  Location: back and right and left abdomen   Condition present for:  awhile.   Previous treatments include: none    Review Of Systems  Eyes: negative  Ears/Nose/Throat: negative  Respiratory: No shortness of breath, dyspnea on exertion, cough, or hemoptysis  Cardiovascular: negative  Gastrointestinal: negative  Genitourinary: negative  Musculoskeletal: negative  Neurologic: negative  Psychiatric: negative    This document serves as a record of the services and decisions personally performed and made by Marcelle Horvath, MS, PA-C. It was created on her behalf by Billie Waddell, a trained medical scribe. The creation of this document is based on the provider's statements to the medical scribe.  Billie Waddell 3:02 PM September 14, 2018    PHYSICAL EXAM:    /78  Pulse 55  SpO2 98%  Skin exam performed as follows: Type 2 skin. Mood appropriate  Alert and Oriented X 3. Well developed, well nourished in no distress.  General appearance: Normal  Head including face: Normal  Eyes: conjunctiva and lids: Normal  Mouth: Lips, teeth, gums: Normal  Neck: Normal  Chest-breast/axillae: Normal  Back: Normal  Spleen and liver: Normal  Cardiovascular: Exam of peripheral vascular system by observation for swelling, varicosities, edema: Normal  Genitalia: groin, buttocks: Normal  Extremities: digits/nails (clubbing): Normal  Eccrine and Apocrine glands: Normal  Right upper extremity: Normal  Left upper extremity: Normal  Right lower extremity: Normal  Left lower extremity: Normal  Skin: Scalp and body hair: See  below    1. Left medial back with inflamed black keratotic papule   2. Inflamed pedunculated papules on the left abdomen x 1, right abdomen x 1    ASSESSMENT/PLAN:     1. ISK r/o atypicality on left medial back. Shave bx in typical fashion .  Area cleaned with betadyne and anesthetized with 1% lidocaine with epi .  Dermablade used to remove the lesion and sent to pathology. Bleeding was cauterized. Pt tolerated procedure well.  2. Inflamed acrochordon on left abdomen x 1 and right abdomen x 1. Irritated per patient. Snip excision performed to all areas. Bleeding stopped. Pt tolerated well with no complications.           Follow-up: pending path/PRN  CC:   Scribed By: Billie Waddell, Medical Scribe    The information in this document, created by the medical scribe for me, accurately reflects the services I personally performed and the decisions made by me. I have reviewed and approved this document for accuracy prior to leaving the patient care area.  September 14, 2018 3:10 PM    Marcelle Horvath MS, PANORY      Again, thank you for allowing me to participate in the care of your patient.        Sincerely,        Marcelle Horvath PA-C

## 2018-09-14 NOTE — PROGRESS NOTES
SUBJECTIVE:   CC: Ricki Blanchard is an 52 year old male who presents for preventative health visit.     Physical   Annual:     Getting at least 3 servings of Calcium per day:  Yes    Bi-annual eye exam:  Yes    Dental care twice a year:  Yes    Sleep apnea or symptoms of sleep apnea:  None    Diet:  Carbohydrate counting and Other    Frequency of exercise:  6-7 days/week    Duration of exercise:  45-60 minutes    Taking medications regularly:  Yes    Medication side effects:  Not applicable    Additional concerns today:  No    Today's PHQ-2 Score:   PHQ-2 ( 1999 [employer]) 9/14/2018   Q1: Little interest or pleasure in doing things 0   Q2: Feeling down, depressed or hopeless 0   PHQ-2 Score 0   Q1: Little interest or pleasure in doing things Not at all   Q2: Feeling down, depressed or hopeless Not at all   PHQ-2 Score 0       Abuse: Current or Past(Physical, Sexual or Emotional)- No  Do you feel safe in your environment - Yes    Social History   Substance Use Topics     Smoking status: Current Every Day Smoker     Packs/day: 0.50     Years: 20.00     Types: Cigarettes     Last attempt to quit: 12/12/2011     Smokeless tobacco: Never Used      Comment: 1/2-1 pk daily     Alcohol use No     Alcohol Use 9/14/2018   If you drink alcohol do you typically have greater than 3 drinks per day OR greater than 7 drinks per week? No     Last PSA:   PSA   Date Value Ref Range Status   02/01/2016 0.92 0 - 4 ug/L Final       Reviewed orders with patient. Reviewed health maintenance and updated orders accordingly - Yes      Reviewed and updated as needed this visit by clinical staff  Tobacco  Allergies  Meds         Reviewed and updated as needed this visit by Provider          Past Medical History:  ---------------------------  Past Medical History:   Diagnosis Date     Chronic pain     disc problems     Diabetes mellitus, type 2 (H) 9/14/2018     Headache(784.0)     like migraines     Hyperlipidemia LDL goal <160 9/1/2011          Migraine headaches 10/25/2010     Neck pain      NSVT (nonsustained ventricular tachycardia) (H)      Obesity (BMI 30-39.9) 4/28/14    BMI 33.4     Prediabetes     A1C 6.5     Syncope      Tobacco abuse 10/25/2010       Past Surgical History:  ---------------------------  Past Surgical History:   Procedure Laterality Date     EP ABLATION / EP STUDIES  09/25/2017    Modification of the slow pathway of the atrioventricular node to prevent presumptive typical AVNRT     MR CARDIAC W CONTRAST AND STRESS  08/28/2017    negative cardiac MRI     REPLACE DISK CERVICAL ANTERIOR  12/13/2011    Procedure:REPLACE DISK CERVICAL ANTERIOR; C5-6 ARTHROPLASTY (PRO DISC-C) (c-arm); Surgeon:ZINA JOSHI; Location:[facility] OR       Current Medications:  ---------------------------  Current Outpatient Prescriptions   Medication Sig Dispense Refill     ibuprofen (ADVIL) 200 MG capsule Take 200 mg by mouth as needed for fever       sildenafil (REVATIO) 20 MG tablet Take 1-2 tablets (20-40 mg) by mouth daily as needed Never use with nitroglycerin, terazosin or doxazosin. 30 tablet 0       Allergies:  -------------  Allergies   Allergen Reactions     Seasonal Allergies Other (See Comments)     Sneezing, watery eyes       Social History:  -------------------  Social History     Social History     Marital status:      Spouse name: N/A     Number of children: N/A     Years of education: N/A     Occupational History     Not on file.     Social History Main Topics     Smoking status: Current Every Day Smoker     Packs/day: 0.50     Years: 20.00     Types: Cigarettes     Last attempt to quit: 12/12/2011     Smokeless tobacco: Never Used      Comment: 1/2-1 pk daily     Alcohol use No     Drug use: No     Sexual activity: Yes     Partners: Female     Other Topics Concern     Not on file     Social History Narrative       Family Medical History:  ------------------------------  Family History   Problem Relation Age of Onset      Lipids Mother      Skin Cancer Mother      Lipids Father      Skin Cancer Father      Skin Cancer Maternal Grandmother      Skin Cancer Maternal Grandfather         Review of Systems  CONSTITUTIONAL: NEGATIVE for fever, chills, change in weight  INTEGUMENTARY/SKIN: NEGATIVE for worrisome rashes, moles or lesions  EYES: NEGATIVE for vision changes or irritation  ENT: NEGATIVE for ear, mouth and throat problems  RESP: NEGATIVE for significant cough or SOB  CV: NEGATIVE for chest pain, palpitations or peripheral edema  GI: NEGATIVE for nausea, abdominal pain, heartburn, or change in bowel habits   male: negative for dysuria, hematuria, decreased urinary stream, erectile dysfunction, urethral discharge  MUSCULOSKELETAL: NEGATIVE for significant arthralgias or myalgia  NEURO: NEGATIVE for weakness, dizziness or paresthesias  PSYCHIATRIC: NEGATIVE for changes in mood or affect    OBJECTIVE:   /86  Pulse 61  Temp 98.2  F (36.8  C) (Oral)  Resp 14  Ht 6' (1.829 m)  Wt 237 lb 9.6 oz (107.8 kg)  SpO2 96%  BMI 32.22 kg/m2    Physical Exam  GENERAL alert and no distress  EYES:  Normal sclera,conjunctiva, EOMI  HENT: oral and posterior pharynx without lesions or erythema, facies symmetric  NECK: Neck supple. No LAD, without thyroidmegaly or JVD., Carotids without bruits.  RESP: Clear to ausculation bilaterally without wheezes or crackles. Normal BS in all fields.  CV: RRR normal S1S2 without murmurs, rubs or gallops. PMI normal  LYMPH: no cervical lymph adenopathy appreciated  MS: extremities- no gross deformities of the visible extremities noted, no edema  PSYCH: Alert and oriented times 3; speech- coherent  SKIN:  No obvious significant skin lesions on visible portions of face         ASSESSMENT/PLAN:     (Z00.00) Routine general medical examination at a health care facility  (primary encounter diagnosis)  Comment: Discussed cardiac disease risk factor modification including screening for and treating HTN,  "lipids, DM, and smoking cessation.  Also discussed age appropriate cancer screening recommendations including testicular, prostate, colon and lung cancer as dictated by age group.  Recommended low fat, low salt diet and moderation in any alcohol intake.  Recommended always using seatbelts when in a car.  Recommended never driving after drinking or riding with someone who has been drinking as well.       Plan: Lipid panel reflex to direct LDL Fasting,         Hemoglobin A1c, Comprehensive metabolic panel            (Z11.59) Need for hepatitis C screening test  Comment:   Plan: Hepatitis C Screen Reflex to HCV RNA Quant and         Genotype            (Z11.4) Screening for HIV (human immunodeficiency virus)  Comment:   Plan: HIV Screening            (N52.9) Erectile dysfunction, unspecified erectile dysfunction type  Comment: This condition is currently controlled on the current medical regimen.  Continue current therapy.   He has not experienced any significant side effects of this medication.   Plan: sildenafil (REVATIO) 20 MG tablet            (Z12.5) Special screening for malignant neoplasm of prostate  Comment:   Plan: PSA, screen            (E78.5) Hyperlipidemia LDL goal <160  Comment: Discussed current lipid results, previous results (if available) current guidelines (NCEP) for treatment and goals for lipids.  Discussed lifestyle modification, dietary changes (low fat, low simple carb) and regular aerobic exercise.  Discussed the link between dysmetabolic syndrome and impaired glucose tolerance seen in certain patterns of lipids.  Briefly discussed medication used for lipid lowering, including the statins are their possible side effects of myalgias, rhabdomyolysis, and liver toxicity.   Plan: Lipid panel reflex to direct LDL Fasting,         Comprehensive metabolic panel            (R73.03) Prediabetes  Comment: Reviewed the labs showing elevated glucose levels.    Discussed \"pre-diabetes\", impaired glucose " "tolerance, and its part in the dysmetabolic syndrome.    Discussed the inevitable progression of impaired glucose tolerance toward worsening diabetes mellitus and the need for agressive interventions now to delay and prevent this inevitable progression.  Discussed the overall risks that dysmetabolic syndromes/impaired glucose tolerance/syndrome X pose toward increased risks of vascular disease as the main reason for agressive intervention now.  Will add medications for glucose control (i.e. metformin, glitazones, etc), lipid (e.g. statins), and for blood pressure (preferably ARBs and ACE) as indicated.  Will start these as early as needed based other proven ability to delay and modify these risk factors.   Discussed the need for aggressive diet control as the cornerstone of pre-diabetes and diabetes management, emphasizing the reduction of \"simple carbohydrates\" (e.g. Any kind of wheat products (e.g. any bread, any pasta), white rice, noodles, potatoes, snack foods, regular soda, juices (except fresh squeezed), cakes, cookies, candy, etc.) along with regular exercise.       Plan: Lipid panel reflex to direct LDL Fasting,         Hemoglobin A1c, Comprehensive metabolic panel               COUNSELING:   Reviewed preventive health counseling, as reflected in patient instructions       Regular exercise  Colon cancer screening  Prostate cancer screening       Healthy diet/nutrition       Vision screening       Aspirin Prophylaxsis    BP Readings from Last 1 Encounters:   09/14/18 154/86     Estimated body mass index is 32.22 kg/(m^2) as calculated from the following:    Height as of this encounter: 6' (1.829 m).    Weight as of this encounter: 237 lb 9.6 oz (107.8 kg).           reports that he has been smoking Cigarettes.  He has a 10.00 pack-year smoking history. He has never used smokeless tobacco.      Counseling Resources:  ATP IV Guidelines  Pooled Cohorts Equation Calculator  FRAX Risk Assessment  ICSI Preventive " Guidelines  Dietary Guidelines for Americans, 2010  [employer]'s MyPlate  ASA Prophylaxis  Lung CA Screening    Ibrahima Campbell MD  [facility]  Answers for HPI/ROS submitted by the patient on 9/14/2018   PHQ-2 Score: 0

## 2018-09-14 NOTE — PATIENT INSTRUCTIONS
Wound Care Instructions     FOR SUPERFICIAL WOUNDS     [facility] [phone]    [facility] [phone]                       AFTER 24 HOURS YOU SHOULD REMOVE THE BANDAGE AND BEGIN DAILY DRESSING CHANGES AS FOLLOWS:     1) Remove Dressing.     2) Clean and dry the area with tap water using a Q-tip or sterile gauze pad.     3) Apply Vaseline, Aquaphor, Polysporin ointment or Bacitracin ointment over entire wound.  Do NOT use Neosporin ointment.     4) Cover the wound with a band-aid, or a sterile non-stick gauze pad and micropore paper tape      REPEAT THESE INSTRUCTIONS AT LEAST ONCE A DAY UNTIL THE WOUND HAS COMPLETELY HEALED.    It is an old wives tale that a wound heals better when it is exposed to air and allowed to dry out. The wound will heal faster with a better cosmetic result if it is kept moist with ointment and covered with a bandage.    **Do not let the wound dry out.**      Supplies Needed:      *Cotton tipped applicators (Q-tips)    *Polysporin Ointment or Bacitracin Ointment (NOT NEOSPORIN)    *Band-aids or non-stick gauze pads and micropore paper tape.      PATIENT INFORMATION:    During the healing process you will notice a number of changes. All wounds develop a small halo of redness surrounding the wound.  This means healing is occurring. Severe itching with extensive redness usually indicates sensitivity to the ointment or bandage tape used to dress the wound.  You should call our office if this develops.      Swelling  and/or discoloration around your surgical site is common, particularly when performed around the eye.    All wounds normally drain.  The larger the wound the more drainage there will be.  After 7-10 days, you will notice the wound beginning to shrink and new skin will begin to grow.  The wound is healed when you can see skin has formed over the entire area.  A healed wound has a healthy, shiny look to the surface and is red to dark pink in color  to normalize.  Wounds may take approximately 4-6 weeks to heal.  Larger wounds may take 6-8 weeks.  After the wound is healed you may discontinue dressing changes.    You may experience a sensation of tightness as your wound heals. This is normal and will gradually subside.    Your healed wound may be sensitive to temperature changes. This sensitivity improves with time, but if you re having a lot of discomfort, try to avoid temperature extremes.    Patients frequently experience itching after their wound appears to have healed because of the continue healing under the skin.  Plain Vaseline will help relieve the itching.        POSSIBLE COMPLICATIONS    BLEEDIN. Leave the bandage in place.  2. Use tightly rolled up gauze or a cloth to apply direct pressure over the bandage for 30  minutes.  3. Reapply pressure for an additional 30 minutes if necessary  4. Use additional gauze and tape to maintain pressure once the bleeding has stopped.

## 2018-09-14 NOTE — PATIENT INSTRUCTIONS
"  *     5 GOALS TO PREVENT VASCULAR DISEASE:     1.  Aggressive blood pressure control, under 130/80 ideally.  Using medications if needed.    Your blood pressure is under good control    BP Readings from Last 4 Encounters:   09/14/18 130/78   09/14/18 154/86   02/23/18 124/90   10/24/17 (!) 158/100       2.  Aggressive LDL cholesterol (\"bad cholesterol\") lowering as indicated.    Your goal is an LDL under 130 for sure, preferably under 100.  (If you have diabetes or previous vascular disease, the the LDL goals would be under 100 for sure, preferably under 70.)    New guidelines identify four high-risk groups who could benefit from statins:   *people with pre-existing heart disease, such as those who have had a heart attack;   *people ages 40 to 75 who have diabetes of any type  *patients ages 40 to 75 with at least a 7.5% risk of developing cardiovascular disease over the next decade, according to a formula described in the guidelines  *patients with the sort of super-high cholesterol that sometimes runs in families, as evidenced by an LDL of 190 milligrams per deciliter or higher    Your cholesterol levels are well controlled.    Recent Labs   Lab Test  02/01/16   1446  04/01/14   0910  12/05/11   0919   CHOL  256*  267*  261*   HDL  41  39*  45   LDL  161*  183*  168*   TRIG  271*  223*  236*   CHOLHDLRATIO   --   6.8*  5.7*       3.  Aggressive diabetic prevention, screening and/or management.      You do not have diabetes as of the most recent blood tests.     4.  No smoking    5.  Consider taking low dose aspirin (81 mg) tablet once per day over the age of 50, every day unless there is a specific reason that you cannot take aspirin (such as side effect, allergy, or you are on another \"blood thinner\").        --Based on your current cardiac risk factors, you should take Aspirin 81 mg once per day if you are over 50 years of age.             Preventive Health Recommendations  Male Ages 50 - 64    Yearly exam: " "            See your health care provider every year in order to  o   Review health changes.   o   Discuss preventive care.    o   Review your medicines if your doctor has prescribed any.     Have a cholesterol test every 5 years, or more frequently if you are at risk for high cholesterol/heart disease.     Have a diabetes test (fasting glucose) every three years. If you are at risk for diabetes, you should have this test more often.     Have a colonoscopy at age 50, or have a yearly FIT test (stool test). These exams will check for colon cancer.      Talk with your health care provider about whether or not a prostate cancer screening test (PSA) is right for you.    You should be tested each year for STDs (sexually transmitted diseases), if you re at risk.     Shots: Get a flu shot each year. Get a tetanus shot every 10 years.     Nutrition:    Eat at least 5 servings of fruits and vegetables daily.     Eat whole-grain bread, whole-wheat pasta and brown rice instead of white grains and rice.     Get adequate Calcium and Vitamin D.        --Good Grains:  Oats, brown rice, Quinoa (these do not raise the blood sugar as much)     --Bad grains:  Anything made from wheat or white rice     (because these raise the blood sugars significantly, and the possible gluten issue from wheat for some people).      --Proteins:  Aim for \"lean proteins\" including chicken, fish, seafood, pork, turkey, and eggs (in moderation); Eat red meat only occasionally            Rashid Das:                         Lifestyle    Exercise for at least 150 minutes a week (30 minutes a day, 5 days a week). This will help you control your weight and prevent disease.     Limit alcohol to one drink per day.     No smoking.     Wear sunscreen to prevent skin cancer.     See your dentist every six months for an exam and cleaning.     See your eye doctor every 1 to 2 years.    "

## 2018-09-14 NOTE — PROGRESS NOTES
HPI:   Ricki Blanchard is a 52 year old male who presents for evaluation of spots on back and left and right abdomen.   chief complaint  Location: back and right and left abdomen   Condition present for:  awhile.   Previous treatments include: none    Review Of Systems  Eyes: negative  Ears/Nose/Throat: negative  Respiratory: No shortness of breath, dyspnea on exertion, cough, or hemoptysis  Cardiovascular: negative  Gastrointestinal: negative  Genitourinary: negative  Musculoskeletal: negative  Neurologic: negative  Psychiatric: negative    This document serves as a record of the services and decisions personally performed and made by Marcelle Horvath, MS, PA-C. It was created on her behalf by Billie Waddell, a trained medical scribe. The creation of this document is based on the provider's statements to the medical scribe.  Billie Waddell 3:02 PM September 14, 2018    PHYSICAL EXAM:    /78  Pulse 55  SpO2 98%  Skin exam performed as follows: Type 2 skin. Mood appropriate  Alert and Oriented X 3. Well developed, well nourished in no distress.  General appearance: Normal  Head including face: Normal  Eyes: conjunctiva and lids: Normal  Mouth: Lips, teeth, gums: Normal  Neck: Normal  Chest-breast/axillae: Normal  Back: Normal  Spleen and liver: Normal  Cardiovascular: Exam of peripheral vascular system by observation for swelling, varicosities, edema: Normal  Genitalia: groin, buttocks: Normal  Extremities: digits/nails (clubbing): Normal  Eccrine and Apocrine glands: Normal  Right upper extremity: Normal  Left upper extremity: Normal  Right lower extremity: Normal  Left lower extremity: Normal  Skin: Scalp and body hair: See below    1. Left medial back with inflamed black keratotic papule   2. Inflamed pedunculated papules on the left abdomen x 1, right abdomen x 1    ASSESSMENT/PLAN:     1. ISK r/o atypicality on left medial back. Shave bx in typical fashion .  Area cleaned with  betadyne and anesthetized with 1% lidocaine with epi .  Dermablade used to remove the lesion and sent to pathology. Bleeding was cauterized. Pt tolerated procedure well.  2. Inflamed acrochordon on left abdomen x 1 and right abdomen x 1. Irritated per patient. Snip excision performed to all areas. Bleeding stopped. Pt tolerated well with no complications.           Follow-up: pending path/PRN  CC:   Scribed By: Billie Waddell, Medical Scribe    The information in this document, created by the medical scribe for me, accurately reflects the services I personally performed and the decisions made by me. I have reviewed and approved this document for accuracy prior to leaving the patient care area.  September 14, 2018 3:10 PM    Marcelle Horvath MS, PAKatherynC

## 2018-09-14 NOTE — MR AVS SNAPSHOT
After Visit Summary   9/14/2018    Ricki Blanchard    MRN: [id number]           Patient Information     Date Of Birth          1965        Visit Information        Provider Department      9/14/2018 2:30 PM Marcelle Horvath PA-C [facility]        Today's Diagnoses     Neoplasm of uncertain behavior of skin    -  1      Care Instructions          Wound Care Instructions     FOR SUPERFICIAL WOUNDS     [facility] [phone]    [facility] [phone]                       AFTER 24 HOURS YOU SHOULD REMOVE THE BANDAGE AND BEGIN DAILY DRESSING CHANGES AS FOLLOWS:     1) Remove Dressing.     2) Clean and dry the area with tap water using a Q-tip or sterile gauze pad.     3) Apply Vaseline, Aquaphor, Polysporin ointment or Bacitracin ointment over entire wound.  Do NOT use Neosporin ointment.     4) Cover the wound with a band-aid, or a sterile non-stick gauze pad and micropore paper tape      REPEAT THESE INSTRUCTIONS AT LEAST ONCE A DAY UNTIL THE WOUND HAS COMPLETELY HEALED.    It is an old wives tale that a wound heals better when it is exposed to air and allowed to dry out. The wound will heal faster with a better cosmetic result if it is kept moist with ointment and covered with a bandage.    **Do not let the wound dry out.**      Supplies Needed:      *Cotton tipped applicators (Q-tips)    *Polysporin Ointment or Bacitracin Ointment (NOT NEOSPORIN)    *Band-aids or non-stick gauze pads and micropore paper tape.      PATIENT INFORMATION:    During the healing process you will notice a number of changes. All wounds develop a small halo of redness surrounding the wound.  This means healing is occurring. Severe itching with extensive redness usually indicates sensitivity to the ointment or bandage tape used to dress the wound.  You should call our office if this develops.      Swelling  and/or discoloration around your surgical site is common,  particularly when performed around the eye.    All wounds normally drain.  The larger the wound the more drainage there will be.  After 7-10 days, you will notice the wound beginning to shrink and new skin will begin to grow.  The wound is healed when you can see skin has formed over the entire area.  A healed wound has a healthy, shiny look to the surface and is red to dark pink in color to normalize.  Wounds may take approximately 4-6 weeks to heal.  Larger wounds may take 6-8 weeks.  After the wound is healed you may discontinue dressing changes.    You may experience a sensation of tightness as your wound heals. This is normal and will gradually subside.    Your healed wound may be sensitive to temperature changes. This sensitivity improves with time, but if you re having a lot of discomfort, try to avoid temperature extremes.    Patients frequently experience itching after their wound appears to have healed because of the continue healing under the skin.  Plain Vaseline will help relieve the itching.        POSSIBLE COMPLICATIONS    BLEEDIN. Leave the bandage in place.  2. Use tightly rolled up gauze or a cloth to apply direct pressure over the bandage for 30  minutes.  3. Reapply pressure for an additional 30 minutes if necessary  4. Use additional gauze and tape to maintain pressure once the bleeding has stopped.            Follow-ups after your visit        Who to contact     If you have questions or need follow up information about today's clinic visit or your schedule please contact [facility] directly at [phone].  Normal or non-critical lab and imaging results will be communicated to you by MyChart, letter or phone within 4 business days after the clinic has received the results. If you do not hear from us within 7 days, please contact the clinic through MyChart or phone. If you have a critical or abnormal lab result, we will notify you by phone as soon as  possible.  Submit refill requests through [employer] or call your pharmacy and they will forward the refill request to us. Please allow 3 business days for your refill to be completed.          Additional Information About Your Visit        [employer] Information     [employer] gives you secure access to your electronic health record. If you see a primary care provider, you can also send messages to your care team and make appointments. If you have questions, please call your primary care clinic.  If you do not have a primary care provider, please call [phone] and they will assist you.        Care EveryWhere ID     This is your Care EveryWhere ID. This could be used by other organizations to access your [facility] medical records  [id number]        Your Vitals Were     Pulse Pulse Oximetry                55 98%           Blood Pressure from Last 3 Encounters:   09/14/18 130/78   09/14/18 154/86   02/23/18 124/90    Weight from Last 3 Encounters:   09/14/18 107.8 kg (237 lb 9.6 oz)   02/23/18 114 kg (251 lb 4.8 oz)   10/24/17 120.7 kg (266 lb)              Today, you had the following     No orders found for display         Today's Medication Changes          These changes are accurate as of 9/14/18  3:13 PM.  If you have any questions, ask your nurse or doctor.               Start taking these medicines.        Dose/Directions    sildenafil 20 MG tablet   Commonly known as:  REVATIO   Used for:  Erectile dysfunction, unspecified erectile dysfunction type   Started by:  Ibrahima Campbell MD        Dose:  20-40 mg   Take 1-2 tablets (20-40 mg) by mouth daily as needed Never use with nitroglycerin, terazosin or doxazosin.   Quantity:  30 tablet   Refills:  0            Where to get your medicines      Some of these will need a paper prescription and others can be bought over the counter.  Ask your nurse if you have questions.     Bring a paper prescription for each of these medications     sildenafil 20 MG tablet                 Primary Care Provider Office Phone # Fax #    Ibrahima Campbell [phone]       [address] [facility] [address]        Equal Access to Services     SHERI DUNCAN : Hadcharlie donte valdes deacon So[place], waaxda luqadaha, qaybta kaalmada filemon, mike maldonado laMilviatello parkinson. So [facility] [phone].    ATENCIÓN: Si habla español, tiene a mclean disposición servicios gratuitos de asistencia lingüística. Llame al [phone].    We comply with applicable federal civil rights laws and [place] laws. We do not discriminate on the basis of race, color, national origin, age, disability, sex, sexual orientation, or gender identity.            Thank you!     Thank you for choosing [facility]  for your care. Our goal is always to provide you with excellent care. Hearing back from our patients is one way we can continue to improve our services. Please take a few minutes to complete the written survey that you may receive in the mail after your visit with us. Thank you!             Your Updated Medication List - Protect others around you: Learn how to safely use, store and throw away your medicines at www.disposemymeds.org.          This list is accurate as of 9/14/18  3:13 PM.  Always use your most recent med list.                   Brand Name Dispense Instructions for use Diagnosis    ADVIL 200 MG capsule   Generic drug:  ibuprofen      Take 200 mg by mouth as needed for fever        sildenafil 20 MG tablet    REVATIO    30 tablet    Take 1-2 tablets (20-40 mg) by mouth daily as needed Never use with nitroglycerin, terazosin or doxazosin.    Erectile dysfunction, unspecified erectile dysfunction type

## 2018-09-17 LAB
HCV AB SERPL QL IA: NONREACTIVE
HIV 1+2 AB+HIV1 P24 AG SERPL QL IA: NONREACTIVE

## 2018-09-17 NOTE — TELEPHONE ENCOUNTER
Unable to locate the rx, called pt, Pt asked if rx to be called into [facility]. Pharm called, left on automatic rx line

## 2018-09-19 LAB — COPATH REPORT: NORMAL

## 2018-10-24 ENCOUNTER — MYC MEDICAL ADVICE (OUTPATIENT)
Dept: INTERNAL MEDICINE | Facility: CLINIC | Age: 53
End: 2018-10-24

## 2018-10-24 DIAGNOSIS — R68.82 DECREASED LIBIDO: Primary | ICD-10-CM

## 2018-10-24 DIAGNOSIS — R53.82 CHRONIC FATIGUE: ICD-10-CM

## 2018-10-24 NOTE — TELEPHONE ENCOUNTER
Dr. Campbell- patient last here in September. Looks like testosterone last checked 2011. Would you like patient to come in for another office visit or do evisit?

## 2018-10-26 DIAGNOSIS — R68.82 DECREASED LIBIDO: ICD-10-CM

## 2018-10-26 DIAGNOSIS — R53.82 CHRONIC FATIGUE: ICD-10-CM

## 2018-10-26 LAB
CORTIS SERPL-MCNC: 6.1 UG/DL (ref 4–22)
TSH SERPL DL<=0.005 MIU/L-ACNC: 1.21 MU/L (ref 0.4–4)
VIT B12 SERPL-MCNC: 808 PG/ML (ref 193–986)

## 2018-10-26 PROCEDURE — 84443 ASSAY THYROID STIM HORMONE: CPT | Performed by: INTERNAL MEDICINE

## 2018-10-26 PROCEDURE — 36415 COLL VENOUS BLD VENIPUNCTURE: CPT | Performed by: INTERNAL MEDICINE

## 2018-10-26 PROCEDURE — 84403 ASSAY OF TOTAL TESTOSTERONE: CPT | Performed by: INTERNAL MEDICINE

## 2018-10-26 PROCEDURE — 82607 VITAMIN B-12: CPT | Performed by: INTERNAL MEDICINE

## 2018-10-26 PROCEDURE — 82533 TOTAL CORTISOL: CPT | Performed by: INTERNAL MEDICINE

## 2018-10-26 PROCEDURE — 84270 ASSAY OF SEX HORMONE GLOBUL: CPT | Performed by: INTERNAL MEDICINE

## 2018-10-26 PROCEDURE — 82306 VITAMIN D 25 HYDROXY: CPT | Performed by: INTERNAL MEDICINE

## 2018-10-27 LAB — DEPRECATED CALCIDIOL+CALCIFEROL SERPL-MC: 33 UG/L (ref 20–75)

## 2018-11-01 LAB
SHBG SERPL-SCNC: 65 NMOL/L (ref 11–80)
TESTOST FREE SERPL-MCNC: 8.55 NG/DL (ref 4.7–24.4)
TESTOST SERPL-MCNC: 630 NG/DL (ref 240–950)

## 2018-11-06 ENCOUNTER — MYC MEDICAL ADVICE (OUTPATIENT)
Dept: INTERNAL MEDICINE | Facility: CLINIC | Age: 53
End: 2018-11-06

## 2018-12-05 ENCOUNTER — MYC MEDICAL ADVICE (OUTPATIENT)
Dept: INTERNAL MEDICINE | Facility: CLINIC | Age: 53
End: 2018-12-05

## 2018-12-20 ENCOUNTER — OFFICE VISIT (OUTPATIENT)
Dept: INTERNAL MEDICINE | Facility: CLINIC | Age: 53
End: 2018-12-20
Payer: COMMERCIAL

## 2018-12-20 VITALS
DIASTOLIC BLOOD PRESSURE: 78 MMHG | WEIGHT: 251.9 LBS | OXYGEN SATURATION: 97 % | BODY MASS INDEX: 34.16 KG/M2 | HEART RATE: 58 BPM | TEMPERATURE: 98.9 F | SYSTOLIC BLOOD PRESSURE: 130 MMHG

## 2018-12-20 DIAGNOSIS — R73.03 PREDIABETES: ICD-10-CM

## 2018-12-20 DIAGNOSIS — Z72.0 TOBACCO ABUSE: ICD-10-CM

## 2018-12-20 DIAGNOSIS — E78.5 HYPERLIPIDEMIA LDL GOAL <160: ICD-10-CM

## 2018-12-20 DIAGNOSIS — Z12.5 SCREENING FOR PROSTATE CANCER: ICD-10-CM

## 2018-12-20 DIAGNOSIS — Z23 NEED FOR PROPHYLACTIC VACCINATION AND INOCULATION AGAINST INFLUENZA: ICD-10-CM

## 2018-12-20 DIAGNOSIS — Z71.6 TOBACCO ABUSE COUNSELING: Primary | ICD-10-CM

## 2018-12-20 PROCEDURE — 99213 OFFICE O/P EST LOW 20 MIN: CPT | Performed by: INTERNAL MEDICINE

## 2018-12-20 NOTE — PROGRESS NOTES
SUBJECTIVE:   Ricki Blanchard is a 53 year old male who presents to clinic today for the following health issues:    Pt coming in because he would like to quit smoking.    Quite 17 years ago using zyban without reported side effects .  Smoke free for 7 years until stress from divorce got him back smoking again.     Currently smoking 10-14 cigs per day.     Wants to try chantix.         2.  The patient has a history of impaired glucose tolerance with regularly elevated blood sugars.    They have not been diagnosed with type II DM or placed on medications for diabetes before.   They deny polyuria, polydipsia.     The patient is obese with a BMI of Body mass index is 34.16 kg/m ..    Review of current labs show:    Lab Results   Component Value Date    A1C 6.0 09/14/2018    A1C 6.6 02/01/2016    A1C 6.4 10/06/2014    A1C 6.5 04/01/2014     @bgl@     Problem list and histories reviewed & adjusted, as indicated.  Additional history: as documented        Reviewed and updated as needed this visit by clinical staff       Reviewed and updated as needed this visit by Provider           Past Medical History:  ---------------------------  Past Medical History:   Diagnosis Date     Chronic pain     disc problems     Diabetes mellitus, type 2 (H) 9/14/2018     Headache(784.0)     like migraines     Hyperlipidemia LDL goal <160 9/1/2011         Migraine headaches 10/25/2010     Neck pain      NSVT (nonsustained ventricular tachycardia) (H)      Obesity (BMI 30-39.9) 4/28/14    BMI 33.4     Prediabetes     A1C 6.5     Syncope      Tobacco abuse 10/25/2010       Past Surgical History:  ---------------------------  Past Surgical History:   Procedure Laterality Date     EP ABLATION / EP STUDIES  09/25/2017    Modification of the slow pathway of the atrioventricular node to prevent presumptive typical AVNRT     MR CARDIAC W CONTRAST AND STRESS  08/28/2017    negative cardiac MRI     REPLACE DISK CERVICAL ANTERIOR  12/13/2011     Procedure:REPLACE DISK CERVICAL ANTERIOR; C5-6 ARTHROPLASTY (PRO DISC-C) (c-arm); Surgeon:ZINA JOSHI; Location:[facility] OR       Current Medications:  ---------------------------  Current Outpatient Medications   Medication Sig Dispense Refill     ibuprofen (ADVIL) 200 MG capsule Take 200 mg by mouth as needed for fever       sildenafil (REVATIO) 20 MG tablet Take 1-2 tablets (20-40 mg) by mouth daily as needed Never use with nitroglycerin, terazosin or doxazosin. 30 tablet 0     varenicline (CHANTIX ROBERT) 0.5 MG X 11 & 1 MG X 42 tablet Take 0.5 mg tab daily for 3 days, THEN 0.5 mg tab twice daily for 4 days, THEN 1 mg twice daily. 53 tablet 0       Allergies:  -------------  Allergies   Allergen Reactions     Seasonal Allergies Other (See Comments)     Sneezing, watery eyes       Social History:  -------------------  Social History     Socioeconomic History     Marital status:      Spouse name: Not on file     Number of children: Not on file     Years of education: Not on file     Highest education level: Not on file   Social Needs     Financial resource strain: Not on file     Food insecurity - worry: Not on file     Food insecurity - inability: Not on file     Transportation needs - medical: Not on file     Transportation needs - non-medical: Not on file   Occupational History     Not on file   Tobacco Use     Smoking status: Current Every Day Smoker     Packs/day: 0.50     Years: 20.00     Pack years: 10.00     Types: Cigarettes     Last attempt to quit: 2011     Years since quittin.0     Smokeless tobacco: Never Used     Tobacco comment: -1 pk daily   Substance and Sexual Activity     Alcohol use: No     Alcohol/week: 0.0 oz     Drug use: No     Sexual activity: Yes     Partners: Female   Other Topics Concern     Parent/sibling w/ CABG, MI or angioplasty before 65F 55M? Not Asked   Social History Narrative     Not on file       Family Medical  History:  ------------------------------  Family History   Problem Relation Age of Onset     Lipids Mother      Skin Cancer Mother      Lipids Father      Skin Cancer Father      Skin Cancer Maternal Grandmother      Skin Cancer Maternal Grandfather          ROS:  REVIEW OF SYSTEMS:    RESP: negative for cough, dyspnea, wheezing, hemoptysis  CV: negative for chest pain, palpitations, PND, JACKSON, orthopnea; reports no changes in their ability to perform physical activity (from cardiovascular standpoint)  GI: negative for dysphagia, N/V, pain, melena, diarrhea and constipation  NEURO: negative for numbness/tingling, paralysis, incoordination, or focal weakness     OBJECTIVE:                                                    /78   Pulse 58   Temp 98.9  F (37.2  C) (Oral)   Wt 114.3 kg (251 lb 14.4 oz)   SpO2 97%   BMI 34.16 kg/m       Physical exam deferred today.           ASSESSMENT/PLAN:                                                      (Z71.6) Tobacco abuse counseling  (primary encounter diagnosis)  Comment: Discussed the physical, psychological, and pharmacological aspects of nicotine addiction and smoking cessation.    Discussed 2 possible regimens.  Option #1:  Chantix alone (no nicotine replacement needed), starting month pack for first month, thencontinuing month pack for 3-6 additional months.  Reviewed the main side effects of the medication and directed him to the company web site for further information and gave pt information handouts (if available)  Option #2:  Recommended nicotine replacement with either gum or patches in a descending manner starting the first day of not smoking.  Also discussed the medication Zyban in the use use smoking cessation.  Recommended starting with 150 mg first thing in the morning for 4 days, then adding a second dose late in the afternoon or early evening.  Discussed the potential side effects including but not limited to seizure, GI upset, insomnia, headache,  "weight loss.    The patient will decide what they want and will let me know what they desire me to prescribe.     After further discussion of medication aids to help stop smoking, the patient has decided to take Chantix.  We discussed the medication in detail, including suspected mechanism of action, duration of treatment (minimum preferred 3 months up to max of 6-9 months).  We also discussed some of the potential side effects of the medication including, but not limited to, GI upset, nausea, headaches, nightmares, strange dreams, and possible effects on the mood, inclduing worsening of depression and/or anxiety.  Also mentioned about isolated incidences of suicide possibly related to Chantix use.  I told the patient to immediately stop the Chantix if they suspect any changes in the mood and to contact us immediately.    I also instructed them not to take Chantix until they had a chance to visit the product official web site to further educate themselves about the medication.     Contact me in one month after startign chantix for an update. As long as helping and no side effects, will continue the prescription with the continuation doses.   Aim for 3 month minimum, usually up to 6 months.     Plan:     (Z72.0) Tobacco abuse  Comment:   Plan: Tobacco Cessation - Order to Satisfy Health         Maintenance, varenicline (CHANTIX ROBERT) 0.5 MG X        11 & 1 MG X 42 tablet            (R73.03) Prediabetes  Comment: Reviewed the labs showing elevated glucose levels.    Discussed \"pre-diabetes\", impaired glucose tolerance, and its part in the dysmetabolic syndrome.    Discussed the inevitable progression of impaired glucose tolerance toward worsening diabetes mellitus and the need for agressive interventions now to delay and prevent this inevitable progression.  Discussed the overall risks that dysmetabolic syndromes/impaired glucose tolerance/syndrome X pose toward increased risks of vascular disease as the main reason " "for agressive intervention now.  Will add medications for glucose control (i.e. metformin, glitazones, etc), lipid (e.g. statins), and for blood pressure (preferably ARBs and ACE) as indicated.  Will start these as early as needed based other proven ability to delay and modify these risk factors.   Discussed the need for aggressive diet control as the cornerstone of pre-diabetes and diabetes management, emphasizing the reduction of \"simple carbohydrates\" (e.g. Any kind of wheat products (e.g. any bread, any pasta), white rice, noodles, potatoes, snack foods, regular soda, juices (except fresh squeezed), cakes, cookies, candy, etc.) along with regular exercise.       Plan:     (Z23) Need for prophylactic vaccination and inoculation against influenza  Comment:   Plan:          See Patient Instructions    PAMELA FONTENOT M.D., MD  [facility]    I spent greater than 15 minutes with pt , greater than 50% of time was educational and counseling.     (Chart documentation may have been completed, in part, with [employer] voice-recognition software. Even though reviewed, some grammatical, spelling, and word errors may remain.)     "

## 2018-12-20 NOTE — LETTER
[address] [address]  [phone]        October 30, 2019    Ricki Blanchard  [address] [facility] [address]              Dear Ricki Blanchard    This is to remind you that your fasting labs is due.    You may call our office at [phone] to schedule an appointment.    Please disregard this notice if you have already had your labs drawn or made an appointment.        Sincerely,        Ibrahima Campbell MD

## 2018-12-20 NOTE — PATIENT INSTRUCTIONS
SMOKING CESSATION:  ===========================================    *  Consider visiting www.Quitplan[employer] for options for support in quitting smoking.     * Set a quit date when you will no longer smoke.    *  Get something for your hands to do when you are relaxing.  Examples may be a rubber band around your wrist, pen to click, poker chip, silver dollar, or  strength ball.  This will keep your hands occupied when you would have normally been holding a cigarette.    *  Have something to put in your mouth ( preferably something healthy).  The oral fixation in smokers can be a difficult thing to get over.  Use gum, Thurston Ranchers, Dum Dum suckers, carrots, celery sticks.  try to avoid the urge to snack or est junk food.  This will help prevent the weight gain that many people who quit smoking can experience.    2 medication options for quitting smoking aids:    ------------------------------------------------------------------    Option #1:  Consider Chantix.  Visit Chantix web site (www.[employer].com) for more details about the medicatino and how it works, and even for coupons.  The nicotine withdrawal is managed via the mechanism by which Chantix works, you do not need nicotine replacement while taking this.   The main side effects include weird or strange dreams, stomach upset, and potential adverse changes in the mood, including worsening of depression or anxiety.  There have even been reports of suicides caused by the worsening in the depression.  If you take Chantix and develop any adverse changes in your mood or become more depressed or more anxious, then you should stop the medicatioon immediately and contact us.  Any side effects from Chantix usually resolve very quickly.      *  If you start Chantix, I will prescribe the first month, then ask that you contact me with an update after the first month, regardless of how you feel to see how the medication is working for you and to make sure no side effects.   "If the medication is working well and there are no side effects, then I will continue the medication.     Option #2:   *  Start Nicotine replacement with either gum or lozenges starting the first day of not smoking.  Slowly decrease the amount of nicotine replacement over a few weeks until you no longer need it.  your body will adjust gradually to requiring no nicotine at all.  The first 1-2 weeks are the toughest as your body gets used to not having nicotine around.    *  Start Zyban (or Buproprion/Wellbutrin) 150 mg once daily first thing in the morning 1 week before your quit date.  If no side effects, then add second dose of 150 mg late in the afternoon or early evening.    *Potential side effects including but not limited to seizure (1 out of 1000 patients on Zyban may experience a seizure), GI upset, insomnia, headache, weight loss.  If your experience side effects while ion Zyban/Wellbutrin, then call [phone] ([facility] Internal Medicine Nurse Line) and let me know.  You only need to stay on the zyban for about 2-3 months before you can stop it.  When you have decided to longer take Zyban/Wellbutrin, then go down to 1 tablet per day again for 1 week, then stop completely.           --Good Grains:  Oats, brown rice, Quinoa (these do not raise the blood sugar as much)     --Bad grains:  Anything made from wheat or white rice     (because these raise the blood sugars significantly, and the possible gluten issue from wheat for some people).      --Proteins:  Aim for \"lean proteins\" including chicken, fish, seafood, pork, turkey, and eggs (in moderation); Eat red meat only occasionally        HOW TO QUIT SMOKING  Smoking is one of the hardest habits to break. About half of all those who have ever smoked have been able to quit, and most of those (about 70%) who still smoke want to quit. Here are some of the best ways to stop smoking.     KEEP TRYING:  It takes most smokers about 8 tries before they are finally " able to fully quit. So, the more often you try and fail, the better your chance of quitting the next time! So, don't give up!    GO COLD TURKEY:  Most ex-smokers quit cold turkey. Trying to cut back gradually doesn't seem to work as well, perhaps because it continues the smoking habit. Also, it is possible to fool yourself by inhaling more while smoking fewer cigarettes. This results in the same amount of nicotine in your body!    GET SUPPORT:  Support programs can make an important difference, especially for the heavy smoker. These groups offer lectures, methods to change your behavior and peer support. Call the free national Quitline for more information. 800-QUIT-NOW (637-458-9901). Low-cost or free programs are offered by many hospitals, local chapters of the American Lung Association (690-862-8448) and the American Cancer Society (186-236-9338). Support at home is important too. Non-smokers can help by offering praise and encouragement. If the smoker fails to quit, encourage them to try again!    OVER-THE-COUNTER MEDICINES:  For those who can't quit on their own, Nicotine Replacement Therapy (NRT) may make quitting much easier. Certain aids such as the nicotine patch, gum and lozenge are available without a prescription. However, it is best to use these under the guidance of your doctor. The skin patch provides a steady supply of nicotine to the body. Nicotine gum and lozenge gives temporary bursts of low levels of nicotine. Both methods take the edge off the craving for cigarettes. WARNING: If you feel symptoms of nicotine overdose, such as nausea, vomiting, dizziness, weakness, or fast heartbeat, stop using these and see your doctor.    PRESCRIPTION MEDICINES:  After evaluating your smoking patterns and prior attempts at quitting, your doctor may offer a prescription medicine such as bupropion (Zyban, Wellbutrin), varenicline (Chantix, Champix), a niocotine inhaler or nasal spray. Each has its unique  advantage and side effects which your doctor can review with you.    HEALTH BENEFITS OF QUITTING:  The benefits of quitting start right away and keep improving the longer you go without smokin minutes: blood pressure and pulse return to normal  8 hours: oxygen levels return to normal  2 days: ability to smell and taste begins to improve as damaged nerves start to regrow  2-3 weeks: circulation and lung function improves  1-9 months: decreased cough, congestion and shortness of breath; less tired  1 year: risk of heart attack decreases by half  5 years: risk of lung cancer decreases by half; risk of stroke becomes the same as a non-smoker  For information about how to quit smoking, visit the following links:  National [employer] [place] ,   Clearing the Air, Quit Smoking Today   - an online booklet. http://www.smokefree.gov/pubs/clearing_the_air.pdf  Smokefree.gov http://smokefree.gov/  QuitNet http://www.quitnet.com/    4779-2306 [place] [address]. All rights reserved. This information is not intended as a substitute for professional medical care. Always follow your healthcare professional's instructions.    The Benefits of Living Smoke Free  What do you want to gain from quitting? Check off some reasons to quit.  Health Benefits  ___ Reduce my risk of lung cancer, heart disease, chronic lung disease  ___ Have fewer wrinkles and softer skin  ___ Improve my sense of taste and smell  ___ For pregnant women--reduce the risk of having a miscarriage, stillbirth, premature birth, or low-birth-weight baby  Personal Benefits  ___ Feel more in control of my life  ___ Have better-smelling hair, breath, clothes, home, and car  ___ Save time by not having to take smoke breaks, buy cigarettes, or hunt for a light  ___ Have whiter teeth  Family Benefits  ___ Reduce my children s respiratory tract infections  ___ Set a good example for my children  ___ Reduce my family s cancer  risk  Financial Benefits  ___ Save hundreds of dollars each year that would be spent on cigarettes  ___ Save money on medical bills  ___ Save on life, health, and car insurance premiums    Those Dollars Add Up!  Cigarettes are expensive, and getting more expensive all the time. Do you realize how much money you are spending on cigarettes per year? What is the average amount you spend on a pack of cigarettes? What is the average number of packs that you smoke per day? Using your answers to these questions, fill in this formula to help you find out:  ($ _____ per pack) ×  ( _____ number of packs per day) × (365 days) =  $ _____ yearly cost of smoking  Besides tobacco, there are other costs, including extra cleaning bills and replacement costs for clothing and furniture; medical expenses for smoking-related illnesses; and higher health, life, and car insurance premiums.    Cigars and Pipes Count Too!  Cigars and pipes are also dangerous. So are smokeless (chewing) tobacco and snuff. All of these products contain nicotine, a highly addictive substance that has harmful effects on your body. Quitting smoking means giving up all tobacco products.      7487-9909 [employer], [address] [place] [address], [place] [address]. All rights reserved. This information is not intended as a substitute for professional medical care. Always follow your healthcare professional's instructions.

## 2019-01-15 ENCOUNTER — MYC REFILL (OUTPATIENT)
Dept: INTERNAL MEDICINE | Facility: CLINIC | Age: 54
End: 2019-01-15

## 2019-01-15 ENCOUNTER — MYC MEDICAL ADVICE (OUTPATIENT)
Dept: INTERNAL MEDICINE | Facility: CLINIC | Age: 54
End: 2019-01-15

## 2019-01-15 DIAGNOSIS — Z72.0 TOBACCO ABUSE: ICD-10-CM

## 2019-01-15 DIAGNOSIS — Z71.6 ENCOUNTER FOR SMOKING CESSATION COUNSELING: Primary | ICD-10-CM

## 2019-01-15 NOTE — TELEPHONE ENCOUNTER
"Requested Prescriptions   Pending Prescriptions Disp Refills     varenicline (CHANTIX ROBERT) 0.5 MG X 11 & 1 MG X 42 tablet 53 tablet 0     Sig: Take 0.5 mg tab daily for 3 days, THEN 0.5 mg tab twice daily for 4 days, THEN 1 mg twice daily.    Partial Cholinergic Nicotinic Agonist Agents Passed - 1/15/2019  7:43 AM       Passed - Blood pressure under 140/90 in past 12 months    BP Readings from Last 3 Encounters:   12/20/18 130/78   09/14/18 130/78   09/14/18 154/86                Passed - Recent (12 mo) or future (30 days) visit within the authorizing provider's specialty    Patient had office visit in the last 12 months or has a visit in the next 30 days with authorizing provider or within the authorizing provider's specialty.  See \"Patient Info\" tab in inbasket, or \"Choose Columns\" in Meds & Orders section of the refill encounter.             Passed - Medication is active on med list       Passed - Patient is 18 years of age or older        Last Written Prescription Date:  12/20/18  Last Fill Quantity: 53,  # refills: 0   Last office visit: 12/20/2018 with prescribing provider:  PCP   Future Office Visit:      "

## 2019-01-16 RX ORDER — VARENICLINE TARTRATE 1 MG/1
1 TABLET, FILM COATED ORAL 2 TIMES DAILY
Qty: 180 TABLET | Refills: 0 | Status: SHIPPED | OUTPATIENT
Start: 2019-01-16 | End: 2019-06-25

## 2019-01-16 NOTE — TELEPHONE ENCOUNTER
[facility] called:    Patient picked up Chantix starting robbi on 12/20/2018. They are looking for continuation robbi as long as patient has not started over.     Chart reviewed;Patient sent Mychart with medication update and things are going well.     Please sign new Rx for continuation pack per LOV note below. Med and Pharm Td'up.      Contact me in one month after startign chantix for an update. As long as helping and no side effects, will continue the prescription with the continuation doses.   Aim for 3 month minimum, usually up to 6 months.       Susana ARAYA, RN, BSN, PHN

## 2019-06-25 ENCOUNTER — MYC REFILL (OUTPATIENT)
Dept: INTERNAL MEDICINE | Facility: CLINIC | Age: 54
End: 2019-06-25

## 2019-06-25 DIAGNOSIS — Z71.6 ENCOUNTER FOR SMOKING CESSATION COUNSELING: ICD-10-CM

## 2019-06-25 RX ORDER — VARENICLINE TARTRATE 1 MG/1
1 TABLET, FILM COATED ORAL 2 TIMES DAILY
Qty: 180 TABLET | Refills: 1 | Status: SHIPPED | OUTPATIENT
Start: 2019-06-25 | End: 2019-11-13

## 2019-06-25 NOTE — TELEPHONE ENCOUNTER
"Prescription approved per [facility] Refill Protocol.      Requested Prescriptions   Pending Prescriptions Disp Refills     varenicline (CHANTIX) 1 MG tablet 180 tablet 0     Sig: Take 1 tablet (1 mg) by mouth 2 times daily       Partial Cholinergic Nicotinic Agonist Agents Passed - 6/25/2019  5:45 PM        Passed - Blood pressure under 140/90 in past 12 months     BP Readings from Last 3 Encounters:   12/20/18 130/78   09/14/18 130/78   09/14/18 154/86                 Passed - Recent (12 mo) or future (30 days) visit within the authorizing provider's specialty     Patient had office visit in the last 12 months or has a visit in the next 30 days with authorizing provider or within the authorizing provider's specialty.  See \"Patient Info\" tab in inbasket, or \"Choose Columns\" in Meds & Orders section of the refill encounter.              Passed - Medication is active on med list        Passed - Patient is 18 years of age or older          "

## 2019-09-28 ENCOUNTER — HEALTH MAINTENANCE LETTER (OUTPATIENT)
Age: 54
End: 2019-09-28

## 2019-10-27 ENCOUNTER — HEALTH MAINTENANCE LETTER (OUTPATIENT)
Age: 54
End: 2019-10-27

## 2019-11-13 ENCOUNTER — TELEPHONE (OUTPATIENT)
Dept: INTERNAL MEDICINE | Facility: CLINIC | Age: 54
End: 2019-11-13

## 2019-11-13 DIAGNOSIS — Z71.6 ENCOUNTER FOR SMOKING CESSATION COUNSELING: ICD-10-CM

## 2019-11-13 RX ORDER — VARENICLINE TARTRATE 1 MG/1
TABLET, FILM COATED ORAL
Qty: 168 TABLET | Refills: 0 | Status: SHIPPED | OUTPATIENT
Start: 2019-11-13 | End: 2020-02-26

## 2019-11-13 NOTE — TELEPHONE ENCOUNTER
Prior Authorization Retail Medication Request    Medication/Dose: CHANTIX 1 MG tablet  ICD code (if different than what is on RX):  Z71.6  Previously Tried and Failed:    Rationale:      Insurance Name:  [facility]  Insurance ID:  [id number]        Pharmacy Information (if different than what is on RX)  Name:  [facility]  Phone:  [phone]

## 2019-11-13 NOTE — TELEPHONE ENCOUNTER
"Requested Prescriptions   Pending Prescriptions Disp Refills     CHANTIX 1 MG tablet [Pharmacy Med Name: CHANTIX 1 MG TABLET] 168 tablet 1     Sig: TAKE 1 TABLET BY MOUTH TWICE A DAY       Partial Cholinergic Nicotinic Agonist Agents Passed - 11/13/2019 10:33 AM        Passed - Blood pressure under 140/90 in past 12 months     BP Readings from Last 3 Encounters:   12/20/18 130/78   09/14/18 130/78   09/14/18 154/86                 Passed - Recent (12 mo) or future (30 days) visit within the authorizing provider's specialty     Patient has had an office visit with the authorizing provider or a provider within the authorizing providers department within the previous 12 mos or has a future within next 30 days. See \"Patient Info\" tab in inbasket, or \"Choose Columns\" in Meds & Orders section of the refill encounter.              Passed - Medication is active on med list        Passed - Patient is 18 years of age or older          "

## 2019-11-14 NOTE — TELEPHONE ENCOUNTER
Central Prior Authorization Team   Phone: [phone]      PA Initiation    Medication: CHANTIX 1 MG tablet-Initiated  Insurance Company: [employer] - Phone [phone] Fax [phone]  Pharmacy Filling the Rx: [id number] - [place], [place] - [address]  Filling Pharmacy Phone: [phone]  Filling Pharmacy Fax:    Start Date: 11/14/2019

## 2019-11-15 NOTE — TELEPHONE ENCOUNTER
"Please contact the patient.   His insurance will not covered Chantix at this time, he has reached quantity limits allowed by his plan.   This does not mean that he cannot take Chantix if he absolutely feels that he needs it.    This medication is typically only needed for 6 months, then not required any longer. Chantix is only used on a short term basis, he will have to stop using it at some point.      It is still very important that he continue to quit smoking.       His choices are:  1.  pay cash for it, most times it is cheaper than what people pay for cigarettes already  2.  Stop the medication because it is not needed any longer.   3.  Try Chantix again once the new calendar year for his insurance.      We can discuss this the next time he is in for a visit, which should be about now anyway so we can follow up on his other medical issues like prediabetes, cholesterol, etc.     Return to see me in approximately 1 month, sooner if needed.  Please get fasting labs done in the [facility] lab 1-2 days before this appointment (make a  \"lab appointment\").  Eat nothing for at least 8 hours prior to having these labs drawn.  Use [employer] or Call [phone] to schedule the appointment with me and lab appointment.       "

## 2019-11-15 NOTE — TELEPHONE ENCOUNTER
PRIOR AUTHORIZATION DENIED    Medication: CHANTIX 1 MG tablet-DENIED    Denial Date: 11/14/2019    Denial Rational: Additional quantity has been denied because patient's pharmacy benefits does not cover additional quantities of requested drug.        Appeal Information:

## 2019-11-18 NOTE — TELEPHONE ENCOUNTER
Informed patient of information below. He verbalized an understanding and agrees with plan. He will call back to schedule a lab and office visit appointment.    COLE Barlow

## 2020-02-26 ENCOUNTER — OFFICE VISIT (OUTPATIENT)
Dept: INTERNAL MEDICINE | Facility: CLINIC | Age: 55
End: 2020-02-26
Payer: COMMERCIAL

## 2020-02-26 VITALS
WEIGHT: 258.7 LBS | SYSTOLIC BLOOD PRESSURE: 134 MMHG | BODY MASS INDEX: 33.2 KG/M2 | HEART RATE: 60 BPM | OXYGEN SATURATION: 99 % | DIASTOLIC BLOOD PRESSURE: 86 MMHG | HEIGHT: 74 IN | TEMPERATURE: 98.3 F

## 2020-02-26 DIAGNOSIS — Z01.818 PREOP GENERAL PHYSICAL EXAM: Primary | ICD-10-CM

## 2020-02-26 DIAGNOSIS — H25.9 AGE-RELATED CATARACT OF BOTH EYES, UNSPECIFIED AGE-RELATED CATARACT TYPE: ICD-10-CM

## 2020-02-26 DIAGNOSIS — Z71.6 ENCOUNTER FOR SMOKING CESSATION COUNSELING: ICD-10-CM

## 2020-02-26 DIAGNOSIS — E78.5 HYPERLIPIDEMIA LDL GOAL <160: ICD-10-CM

## 2020-02-26 DIAGNOSIS — E11.9 TYPE 2 DIABETES MELLITUS WITHOUT COMPLICATION, WITHOUT LONG-TERM CURRENT USE OF INSULIN (H): ICD-10-CM

## 2020-02-26 DIAGNOSIS — E66.9 OBESITY (BMI 30-39.9): ICD-10-CM

## 2020-02-26 DIAGNOSIS — N52.9 ERECTILE DYSFUNCTION, UNSPECIFIED ERECTILE DYSFUNCTION TYPE: ICD-10-CM

## 2020-02-26 DIAGNOSIS — Z12.5 SCREENING FOR PROSTATE CANCER: ICD-10-CM

## 2020-02-26 DIAGNOSIS — R73.03 PREDIABETES: ICD-10-CM

## 2020-02-26 LAB
ALBUMIN SERPL-MCNC: 3.8 G/DL (ref 3.4–5)
ALP SERPL-CCNC: 52 U/L (ref 40–150)
ALT SERPL W P-5'-P-CCNC: 41 U/L (ref 0–70)
ANION GAP SERPL CALCULATED.3IONS-SCNC: 5 MMOL/L (ref 3–14)
AST SERPL W P-5'-P-CCNC: 29 U/L (ref 0–45)
BILIRUB SERPL-MCNC: 0.7 MG/DL (ref 0.2–1.3)
BUN SERPL-MCNC: 12 MG/DL (ref 7–30)
CALCIUM SERPL-MCNC: 8.7 MG/DL (ref 8.5–10.1)
CHLORIDE SERPL-SCNC: 102 MMOL/L (ref 94–109)
CHOLEST SERPL-MCNC: 261 MG/DL
CO2 SERPL-SCNC: 27 MMOL/L (ref 20–32)
CREAT SERPL-MCNC: 1.12 MG/DL (ref 0.66–1.25)
ERYTHROCYTE [DISTWIDTH] IN BLOOD BY AUTOMATED COUNT: 12.4 % (ref 10–15)
GFR SERPL CREATININE-BSD FRML MDRD: 73 ML/MIN/{1.73_M2}
GLUCOSE SERPL-MCNC: 134 MG/DL (ref 70–99)
HBA1C MFR BLD: 6.8 % (ref 0–5.6)
HCT VFR BLD AUTO: 45.8 % (ref 40–53)
HDLC SERPL-MCNC: 43 MG/DL
HGB BLD-MCNC: 16.4 G/DL (ref 13.3–17.7)
LDLC SERPL CALC-MCNC: 184 MG/DL
MCH RBC QN AUTO: 30.4 PG (ref 26.5–33)
MCHC RBC AUTO-ENTMCNC: 35.8 G/DL (ref 31.5–36.5)
MCV RBC AUTO: 85 FL (ref 78–100)
NONHDLC SERPL-MCNC: 218 MG/DL
PLATELET # BLD AUTO: 236 10E9/L (ref 150–450)
POTASSIUM SERPL-SCNC: 4.3 MMOL/L (ref 3.4–5.3)
PROT SERPL-MCNC: 7.3 G/DL (ref 6.8–8.8)
PSA SERPL-ACNC: 0.76 UG/L (ref 0–4)
RBC # BLD AUTO: 5.39 10E12/L (ref 4.4–5.9)
SODIUM SERPL-SCNC: 134 MMOL/L (ref 133–144)
TRIGL SERPL-MCNC: 168 MG/DL
WBC # BLD AUTO: 8.5 10E9/L (ref 4–11)

## 2020-02-26 PROCEDURE — 80061 LIPID PANEL: CPT | Performed by: INTERNAL MEDICINE

## 2020-02-26 PROCEDURE — 85027 COMPLETE CBC AUTOMATED: CPT | Performed by: INTERNAL MEDICINE

## 2020-02-26 PROCEDURE — 83036 HEMOGLOBIN GLYCOSYLATED A1C: CPT | Performed by: INTERNAL MEDICINE

## 2020-02-26 PROCEDURE — 36415 COLL VENOUS BLD VENIPUNCTURE: CPT | Performed by: INTERNAL MEDICINE

## 2020-02-26 PROCEDURE — 80053 COMPREHEN METABOLIC PANEL: CPT | Performed by: INTERNAL MEDICINE

## 2020-02-26 PROCEDURE — G0103 PSA SCREENING: HCPCS | Performed by: INTERNAL MEDICINE

## 2020-02-26 PROCEDURE — 99215 OFFICE O/P EST HI 40 MIN: CPT | Performed by: INTERNAL MEDICINE

## 2020-02-26 RX ORDER — SILDENAFIL 100 MG/1
50-100 TABLET, FILM COATED ORAL DAILY PRN
Qty: 30 TABLET | Refills: 5 | Status: SHIPPED | OUTPATIENT
Start: 2020-02-26 | End: 2021-02-23

## 2020-02-26 RX ORDER — VARENICLINE TARTRATE 1 MG/1
1 TABLET, FILM COATED ORAL 2 TIMES DAILY
Qty: 180 TABLET | Refills: 1 | Status: SHIPPED | OUTPATIENT
Start: 2020-02-26 | End: 2021-02-23

## 2020-02-26 RX ORDER — ATORVASTATIN CALCIUM 20 MG/1
20 TABLET, FILM COATED ORAL AT BEDTIME
Qty: 90 TABLET | Refills: 1 | Status: SHIPPED | OUTPATIENT
Start: 2020-02-26 | End: 2021-02-23

## 2020-02-26 ASSESSMENT — MIFFLIN-ST. JEOR: SCORE: 2073.21

## 2020-02-26 NOTE — PATIENT INSTRUCTIONS
"  *  New onse type 2 diabetes.  Diet control only..     *  Reduce the intake of \"simple carbohydrates\" (e.g. White bread, white rice, pasta, noodles, potatoes, snack foods, regular soda, juices (except fresh squeezed), cakes, cookies, candy, etc.)     *  I will most probably start you on a \"statin\" cholesterol lowering medication since you have the mildly elevated glucose levels.  Taking this type of medicaiot niwll help reduce the chance of vascular events over the next 25 years.     *  Return to see me in 6 months, sooner if needed.  Please get fasting labs done at the [facility] or any other [facility] lab 1-2 days before this appointment (schedule a \"lab appointment\").  If you get the labs done at another clinic, make arrangements with them directly.  The orders will be in place.  Eat nothing for at least 8 hours prior to having these labs drawn.  Use [employer] or Call [phone] to schedule the appointment with me and lab appointment.           PRE-OPERATIVE INSTRUCTIONS:     *  Contact your surgeon if there is any change in your health. This includes signs of fection, such as cold or flu (such as a sore throat, runny nose, cough, rash or fever).    *  No need to stop aspirin or NSAIDS (Advil, Motrin, Aleve, Ibuprofen, or prescription versions of these medications).      *  Prepare your body as instructed by the surgery clinic.  If instructed, Take a shower or bath the night before surgery. Use any special soaps or cleaning instructions according to instructions from your surgeon.  If you do not have soap from your surgeon, use your regular soap. Do not shave or scrub the surgery site.  Wear clean pajamas and have clean sheets on your bed     *  ON THE MORNING OF SURGERY:     --take no medications     *  Resume all medications at the same doses after surgery, unless instructed otherwise by the medical staff.     *  Attend all follow up appointments with the surgeons (and/or therapists if " applicable) as instructed.     *  Contact the surgeon's office for any specific questions about after-surgery cares and follow up instructions.        IF YOU REQUIRE NARCOTIC PAIN MEDICATION AFTER YOUR SURGERY:    --Take the narcotic pain medication exactly as prescribed, and use the absolute lowest dose needed for pain control.   The goal of pain medication is not complete pain relief, aim to just make it manageable.    --Beware of drowsiness, nausea, vomiting, when taking this medication.  Do not drive, or operate dangerous equipment after taking this.    --The main side effects from narcotic pain medication can also include intestinal side effects including nausea, vomiting, constipation, or diarrhea.    --If your pain is not able to be controlled with the pain medication supplied to you, contact the surgeons because uncontrolled pain can be a sign of possible surgical complications.    --In case of constipation from pain medications, take over the counter Miralax powder or stool softner Senokot.  If you have a history of constipation with narcotic pain medication, consider taking Miralax powder on any day that you take a pain tablet.                     5 GOALS IN MANAGING DIABETES (i.e. to give the best chance to prevent diabetic complications and vascular disease):     1.  Aggressive diabetic management.  The target for A1C (3 months average blood sugar) is ideally below 6.5, preferably below 7.5    Your diabetes is under good control.      Lab Results   Component Value Date    A1C 6.8 02/26/2020    A1C 6.0 09/14/2018    A1C 6.6 02/01/2016    A1C 6.4 10/06/2014    A1C 6.5 04/01/2014       2.  Aggressive blood pressure control, under 130/80 ideally.  Using medications if needed.    Your blood pressure is under good control    BP Readings from Last 4 Encounters:   02/26/20 134/86   12/20/18 130/78   09/14/18 130/78   09/14/18 154/86       3.  Aggressive LDL cholesterol (bad cholesterol) lowering as needed.  Your  "goal is an LDL under 100 for sure, preferably under 70.     *  All patients with diabetes are recommended to be on a \"statin\" cholesterol lowering medication regardless of the cholesterol levels to give the best chance at avoiding vascular disease.      New guidelines identify four high-risk groups who could benefit from statins:   *people with pre-existing heart disease, such as those who have had a heart attack;   *people ages 40 to 75 who have diabetes of any type  *patients ages 40 to 75 with at least a 7.5% risk of developing cardiovascular disease over the next decade, according to a formula described in the guidelines  *patients with the sort of super-high cholesterol that sometimes runs in families, as evidenced by an LDL of 190 milligrams per deciliter or higher    *  Your cholesterol levels will need better controlled.    Recent Labs   Lab Test 09/14/18  1548 02/01/16  1446 04/01/14  0910   CHOL 246* 256* 267*   HDL 48 41 39*   * 161* 183*   TRIG 135 271* 223*   CHOLHDLRATIO  --   --  6.8*       4.  No smoking    5.  Aspirin 81 mg tablet once per day, every day unless there is a specific reason that you cannot take aspirin.       *You should take Aspirin 81 mg once per day, unless you have a reason NOT to take aspirin (i.e. side effect, intolerance, taking another \"blood tinning\" medication)       DIABETES REMINDERS:     1.  Check your blood sugar at least once per day, more often if you take insulin or your diabetes has not been under good control.  1) Check your blood sugar at least once per day, more often if you take insulin or have not been under good control.  Always bring your blood sugar log and meter to your diabetes-related appointments.  2) Your blood sugar goals:   before eating and  two hours after eating.  3) Always be prepared to treat low blood sugar symptoms should it happen. Keep a sugar-containing beverage or food nearby.  4) When to call your clinic:     Blood sugar " "over 400     If you have 2 to 3 low blood sugars (under 70) in a row    Low readings the same time of day several days in a row  5) When to call 911: If your low blood glucose symptoms do not get better with treatment, or if you/someone else is unable to give you treatment.  6) Work with a Certified Diabetes Educator to assist you with your diabetes management  7) Contact us if you have ANY questions about your medications or instructions, or have problems with getting prescriptions filled.            --Good Grains:  Oats, brown rice, Quinoa (these do not raise the blood sugar as much)     --Bad grains:  Anything made from wheat or white rice     (because these raise the blood sugars significantly, and the possible gluten issue from wheat for some people).      --Proteins:  Aim for \"lean proteins\" including chicken, fish, seafood, pork, turkey, and eggs (in moderation); Eat red meat only occasionally          Rashid Das:                      "

## 2020-02-26 NOTE — NURSING NOTE
"Chief Complaint   Patient presents with     Pre-Op Exam     /86   Pulse 60   Temp 98.3  F (36.8  C) (Temporal)   Ht 1.88 m (6' 2\")   Wt 117.3 kg (258 lb 11.2 oz)   SpO2 99%   BMI 33.22 kg/m   Estimated body mass index is 33.22 kg/m  as calculated from the following:    Height as of this encounter: 1.88 m (6' 2\").    Weight as of this encounter: 117.3 kg (258 lb 11.2 oz).        Health Maintenance that is potentially due pending provider review:  NONE    n/a    COLE Barlow  "

## 2020-02-26 NOTE — PROGRESS NOTES
[address] [id number] [phone]  Dept: [phone]    PRE-OP EVALUATION:  Today's date: 2020    Ricki Blanchard (: 1965) presents for pre-operative evaluation assessment as requested by Dr. Sharpe.  He requires evaluation and anesthesia risk assessment prior to undergoing surgery/procedure for treatment of Cataracts.    Fax number for surgical facility: [phone]  Primary Physician: Ibrahima Campbell  Type of Anesthesia Anticipated: to be determined    Patient has a Health Care Directive or Living Will:  NO    Preop Questions 2020   Who is doing your surgery? Dr sharpe   What are you having done? Cataract surgery   Date of Surgery/Procedure: [place] [address]   Facility or Hospital where procedure/surgery will be performed: [facility]   1.  Do you have a history of Heart attack, stroke, stent, coronary bypass surgery, or other heart surgery? No   2.  Do you ever have any pain or discomfort in your chest? No   3.  Do you have a history of  Heart Failure? No   4.   Are you troubled by shortness of breath when:  walking on a level surface, or up a slight hill, or at night? No   5.  Do you currently have a cold, bronchitis or other respiratory infection? No   6.  Do you have a cough, shortness of breath, or wheezing? No   7.  Do you sometimes get pains in the calves of your legs when you walk? No   8. Do you or anyone in your family have previous history of blood clots? No   9.  Do you or does anyone in your family have a serious bleeding problem such as prolonged bleeding following surgeries or cuts? No   10. Have you ever had problems with anemia or been told to take iron pills? No   11. Have you had any abnormal blood loss such as black, tarry or bloody stools? No   12. Have you ever had a blood transfusion? No   13. Have you or any of your relatives ever had problems with anesthesia? No   14. Do you have sleep apnea, excessive snoring  or daytime drowsiness? No   15. Do you have any prosthetic heart valves? No   16. Do you have prosthetic joints? No         HPI:     HPI related to upcoming procedure: bilateral cataracts      *  No recent infectious illnesses.    *  No recent cardiac or pulmonary issues or symptoms.    *  No problems performing vigorous physical activity, no changes in exercise tolerance.    *  No personal or family history of anesthesia complications.    *  No personal or family history of bleeding or clotting disorders.       Pre-Diabetes:  The patient has a history of impaired glucose tolerance with regularly elevated blood sugars.    They have not yet been diagnosed with type II DM or placed on medications for diabetes before.   They deny polyuria, polydipsia.     The patient is obese with a BMI of Body mass index is 33.22 kg/m ..    Obesity:  The patient has had a history of ongoing obesity.  Reviewed the weigth curves.   Their current BMI is:  Body mass index is 33.22 kg/m .  Reviewed previous attempts at weight loss which have not been successful in producing prolonged weigth loss.   Discussed current eating and exercise habits.     Reviewed weights in chart:  Wt Readings from Last 10 Encounters:   02/26/20 117.3 kg (258 lb 11.2 oz)   12/20/18 114.3 kg (251 lb 14.4 oz)   09/14/18 107.8 kg (237 lb 9.6 oz)   02/23/18 114 kg (251 lb 4.8 oz)   10/24/17 120.7 kg (266 lb)   09/25/17 117.4 kg (258 lb 12.8 oz)   09/11/17 117 kg (258 lb)   08/31/17 117.3 kg (258 lb 9.6 oz)   08/24/17 117.2 kg (258 lb 4.8 oz)   07/27/17 113.5 kg (250 lb 3.2 oz)      Hyperlipidemia:  History of elevated LDL cholesterol levels.  Has not been on medication for this in the past.  Reviewed labs as listed in the chart.    SMOKING:  Long history of smoking between 1/2 to 1 pack/day for over 40 years.  Is tried Chantix with some limited success.  He was able to discontinue smoking while taking it but after 6 months he stopped and started smoking again.  He is  interested in 1 to try this again.  Reports an occasional headache otherwise no side effects from taking Chantix.    Erectile dysfunction:  Has used Viagra in the past with some success but would like up in a renewed prescription.  Denies side effects.      MEDICAL HISTORY:     Patient Active Problem List    Diagnosis Date Noted     Type 2 diabetes mellitus without complication, without long-term current use of insulin (H) 02/26/2020     Priority: Medium     A1C 6.8       Syncope      Priority: Medium     NSVT (nonsustained ventricular tachycardia) (H)      Priority: Medium     Prediabetes      Priority: Medium     A1C 6.5       Obesity (BMI 30-39.9) 04/28/2014     Priority: Medium     BMI 33.4       Other postprocedural status(V45.89) 12/29/2011     Priority: Medium     Neck pain 10/13/2011     Priority: Medium     Cervical radiculopathy 10/13/2011     Priority: Medium     Hyperlipidemia LDL goal <160 09/01/2011     Priority: Medium            Migraine headache 10/25/2010     Priority: Medium     (Problem list name updated by automated process. Provider to review and confirm.)       Tobacco abuse 10/25/2010     Priority: Medium      Past Medical History:   Diagnosis Date     Chronic pain     disc problems     Headache(784.0)     like migraines     Hyperlipidemia LDL goal <160 9/1/2011         Migraine headaches 10/25/2010     Neck pain      NSVT (nonsustained ventricular tachycardia) (H)      Obesity (BMI 30-39.9) 4/28/14    BMI 33.4     Prediabetes     A1C 6.5     Syncope      Tobacco abuse 10/25/2010     Type 2 diabetes mellitus without complication, without long-term current use of insulin (H) 02/26/2020    A1C 6.8     Past Surgical History:   Procedure Laterality Date     EP ABLATION / EP STUDIES  09/25/2017    Modification of the slow pathway of the atrioventricular node to prevent presumptive typical AVNRT     MR CARDIAC W CONTRAST AND STRESS  08/28/2017    negative cardiac MRI     REPLACE DISK  "CERVICAL ANTERIOR  2011    Procedure:REPLACE DISK CERVICAL ANTERIOR; C5-6 ARTHROPLASTY (PRO DISC-C) (c-arm); Surgeon:ZINA JOSHI; Location: OR     Current Outpatient Medications   Medication Sig Dispense Refill     atorvastatin (LIPITOR) 20 MG tablet Take 1 tablet (20 mg) by mouth At Bedtime 90 tablet 1     ibuprofen (ADVIL) 200 MG capsule Take 200 mg by mouth as needed for fever       sildenafil (VIAGRA) 100 MG tablet Take 0.5-1 tablets ( mg) by mouth daily as needed (erectile dysfunction) 30 tablet 5     varenicline (CHANTIX) 1 MG tablet Take 1 tablet (1 mg) by mouth 2 times daily 180 tablet 1     OTC products: None, except as noted above and no recent use of OTC ASA, NSAIDS or Steroids    Allergies   Allergen Reactions     Seasonal Allergies Other (See Comments)     Sneezing, watery eyes      Latex Allergy: NO    Social History     Tobacco Use     Smoking status: Current Every Day Smoker     Packs/day: 0.50     Years: 20.00     Pack years: 10.00     Types: Cigarettes     Last attempt to quit: 2011     Years since quittin.2     Smokeless tobacco: Never Used     Tobacco comment: -1 pk daily   Substance Use Topics     Alcohol use: No     Alcohol/week: 0.0 standard drinks     History   Drug Use No       REVIEW OF SYSTEMS:   Constitutional, neuro, ENT, endocrine, pulmonary, cardiac, gastrointestinal, genitourinary, musculoskeletal, integument and psychiatric systems are negative, except as otherwise noted.    EXAM:   /86   Pulse 60   Temp 98.3  F (36.8  C) (Temporal)   Ht 1.88 m (6' 2\")   Wt 117.3 kg (258 lb 11.2 oz)   SpO2 99%   BMI 33.22 kg/m    GENERAL alert and no distress  EYES:  Normal sclera,conjunctiva, EOMI  HENT: oral and posterior pharynx without lesions or erythema, facies symmetric  NECK: Neck supple. No LAD, without thyroidmegaly.  RESP: Clear to ausculation bilaterally without wheezes or crackles. Normal BS in all fields.  CV: RRR normal S1S2 without " murmurs, rubs or gallops.  LYMPH: no cervical lymph adenopathy appreciated  MS: extremities- no gross deformities of the visible extremities noted,   EXT:  no lower extremity edema  PSYCH: Alert and oriented times 3; speech- coherent  SKIN:  No obvious significant skin lesions on visible portions of face     DIAGNOSTICS:   No labs or EKG required for low risk surgery (cataract, skin procedure, breast biopsy, etc)    Recent Labs   Lab Test 09/14/18  1548 09/25/17  0920 07/25/17  1024 02/01/16  1446   HGB  --  17.3 16.8 16.5   PLT  --  224 206 248    139 136 134   POTASSIUM 3.8 4.8 4.0 3.8   CR 1.02 0.97 0.89 1.11   A1C 6.0*  --   --  6.6*      LABS:    Component      Latest Ref Rng & Units 2/26/2020   Sodium      133 - 144 mmol/L 134   Potassium      3.4 - 5.3 mmol/L 4.3   Chloride      94 - 109 mmol/L 102   Carbon Dioxide      20 - 32 mmol/L 27   Anion Gap      3 - 14 mmol/L 5   Glucose      70 - 99 mg/dL 134 (H)   Urea Nitrogen      7 - 30 mg/dL 12   Creatinine      0.66 - 1.25 mg/dL 1.12   GFR Estimate      >60 mL/min/1.73:m2 73   GFR Estimate If Black      >60 mL/min/1.73:m2 85   Calcium      8.5 - 10.1 mg/dL 8.7   Bilirubin Total      0.2 - 1.3 mg/dL 0.7   Albumin      3.4 - 5.0 g/dL 3.8   Protein Total      6.8 - 8.8 g/dL 7.3   Alkaline Phosphatase      40 - 150 U/L 52   ALT      0 - 70 U/L 41   AST      0 - 45 U/L 29   WBC      4.0 - 11.0 10e9/L 8.5   RBC Count      4.4 - 5.9 10e12/L 5.39   Hemoglobin      13.3 - 17.7 g/dL 16.4   Hematocrit      40.0 - 53.0 % 45.8   MCV      78 - 100 fl 85   MCH      26.5 - 33.0 pg 30.4   MCHC      31.5 - 36.5 g/dL 35.8   RDW      10.0 - 15.0 % 12.4   Platelet Count      150 - 450 10e9/L 236   Hemoglobin A1C      0 - 5.6 % 6.8 (H)       IMPRESSION:   Reason for surgery/procedure: bilateral cataracts    Diagnosis/reason for consult:     1. Preop general physical exam    2. Age-related cataract of both eyes, unspecified age-related cataract type    3. Type 2 diabetes  mellitus without complication, without long-term current use of insulin (H)    4. Erectile dysfunction, unspecified erectile dysfunction type    5. Encounter for smoking cessation counseling    6. Hyperlipidemia LDL goal <160    7. Obesity (BMI 30-39.9)         The proposed surgical procedure is considered LOW risk.    REVISED CARDIAC RISK INDEX  The patient has the following serious cardiovascular risks for perioperative complications such as (MI, PE, VFib and 3  AV Block):  No serious cardiac risks  INTERPRETATION: 0 risks: Class I (very low risk - 0.4% complication rate)    The patient has the following additional risks for perioperative complications:  No identified additional risks      ICD-10-CM    1. Preop general physical exam Z01.818    2. Age-related cataract of both eyes, unspecified age-related cataract type H25.9    3. Type 2 diabetes mellitus without complication, without long-term current use of insulin (H) E11.9 Lipid panel reflex to direct LDL Fasting     Comprehensive metabolic panel     Hemoglobin A1c     Albumin Random Urine Quantitative with Creat Ratio     atorvastatin (LIPITOR) 20 MG tablet   4. Erectile dysfunction, unspecified erectile dysfunction type N52.9 sildenafil (VIAGRA) 100 MG tablet   5. Encounter for smoking cessation counseling Z71.6 varenicline (CHANTIX) 1 MG tablet   6. Hyperlipidemia LDL goal <160 E78.5 atorvastatin (LIPITOR) 20 MG tablet   7. Obesity (BMI 30-39.9) E66.9        RECOMMENDATIONS:     Type 2 diabetes:  Had been considered prediabetic in the past, but his most recent lab does put him into the type 2 diabetes range..  Admits he does not follow a good low carbohydrate diet.  Reviewed type 2 diabetes at length.  Recommend very strongly a lower carbohydrate diet.  He was not interested in medications at this time.  I feel is reasonable to wait and see how well he can improve things which is diet alone.  Offered referral to diabetes education, he declined at this time.   We will repeat the blood test in 6 months and intervene further as indicated.  Would start with metformin if were going to start medication, then add Trulicity or similar GLP-1 agent if needed.      Cardiovascular Risk  Performs 4 METs exercise without symptoms (Light housework (dusting, washing dishes), Climb a flight of stairs and Walk on level ground at 15 minutes per mile (4 miles/hour)) .       Pulmonary Risk  History of smoking, without any formal diagnosis of lung disease or current respiratory symptoms.  Observe for postoperative bronchospasm, treated with albuterol nebulizer if needed.      --Patient is to take all scheduled medications on the day of surgery EXCEPT for modifications listed below.    Anticoagulant or Antiplatelet Medication Use  Bleeding risk is low for this procedure (e.g. dental, skin, cataract)        APPROVAL GIVEN to proceed with proposed procedure, without further diagnostic evaluation       Signed Electronically by: Ibrahima Campbell MD    Copy of this evaluation report is provided to requesting physician.    I spent greater than 45 minutes with pt, greater than 50% of time was educational and counseling.     [place] Preop Guidelines    Revised Cardiac Risk Index

## 2020-07-29 ENCOUNTER — VIRTUAL VISIT (OUTPATIENT)
Dept: FAMILY MEDICINE | Facility: OTHER | Age: 55
End: 2020-07-29

## 2020-07-29 NOTE — PROGRESS NOTES
"Date: 2020 09:34:10  Clinician: Benjamin Diez  Clinician NPI: [id number]  Patient: Lance Blanchard  Patient : 1965  Patient Address: [address] [place] [address]  Patient Phone: [phone]  Visit Protocol: URI  Patient Summary:  Lance is a 54 year old ( : 1965 ) male who initiated a Visit for COVID-19 (Coronavirus) evaluation and screening. When asked the question \"Please sign me up to receive news, health information and promotions. \", Lance responded \"No\".    Lance states his symptoms started 1-2 days ago.   His symptoms consist of nausea, diarrhea, myalgia, malaise, and a headache.   Symptom details   Headache: He states the headache is moderate (4-6 on a 10 point pain scale).    Lance denies having wheezing, teeth pain, ageusia, sore throat, anosmia, facial pain or pressure, fever, cough, nasal congestion, vomiting, rhinitis, ear pain, chills, and enlarged lymph nodes. He also denies having recent facial or sinus surgery in the past 60 days and taking antibiotic medication in the past month. He is not experiencing dyspnea.   Precipitating events  He has not recently been exposed to someone with influenza. Lance has been in close contact with the following high risk individuals: immunocompromised people.   Pertinent COVID-19 (Coronavirus) information  In the past 14 days, Lance has not worked in a congregate living setting.   He does not work or volunteer as healthcare worker or a  and does not work or volunteer in a healthcare facility.   Lance also has not lived in a congregate living setting in the past 14 days. He does not live with a healthcare worker.   Lance has not had a close contact with a laboratory-confirmed COVID-19 patient within 14 days of symptom onset.   Pertinent medical history  Lance does not need a return to work/school note.   Weight: 245 lbs   Lance smokes or uses smokeless tobacco.   Weight: 245 lbs    MEDICATIONS: No current medications, ALLERGIES: " "NKDA  Clinician Response:  Dear Lance,   Your symptoms show that you may have coronavirus (COVID-19). This illness can cause fever, cough and trouble breathing. Many people get a mild case and get better on their own. Some people can get very sick.  What should I do?  We would like to test you for this virus.   1. Please call [phone] to schedule your visit. Explain that you were referred by [facility] to have a COVID-19 test. Be ready to share your [facility] visit ID number.  The following will serve as your written order for this COVID Test, ordered by me, for the indication of suspected COVID [Z20.828]: The test will be ordered in [employer], our electronic health record, after you are scheduled. It will show as ordered and authorized by Bishop Ayers MD.  Order: COVID-19 (Coronavirus) PCR for SYMPTOMATIC testing from [facility].      2. When it's time for your COVID test:  Stay at least 6 feet away from others. (If someone will drive you to your test, stay in the backseat, as far away from the  as you can.)   Cover your mouth and nose with a mask, tissue or washcloth.  Go straight to the testing site. Don't make any stops on the way there or back.      3.Starting now: Stay home and away from others (self-isolate) until:   You've had no fever---and no medicine that reduces fever---for 3 full days (72 hours). And...   Your other symptoms have gotten better. For example, your cough or breathing has improved. And...   At least 10 days have passed since your symptoms started.       During this time, don't leave the house except for testing or medical care.   Stay in your own room, even for meals. Use your own bathroom if you can.   Stay away from others in your home. No hugging, kissing or shaking hands. No visitors.  Don't go to work, school or anywhere else.    Clean \"high touch\" surfaces often (doorknobs, counters, handles, etc.). Use a household cleaning spray or wipes. You'll find a full list of  on the EPA " website: www.epa.gov/pesticide-registration/list-n-disinfectants-use-against-sars-cov-2.   Cover your mouth and nose with a mask, tissue or washcloth to avoid spreading germs.  Wash your hands and face often. Use soap and water.  Caregivers in these groups are at risk for severe illness due to COVID-19:  o People 65 years and older  o People who live in a nursing home or long-term care facility  o People with chronic disease (lung, heart, cancer, diabetes, kidney, liver, immunologic)  o People who have a weakened immune system, including those who:   Are in cancer treatment  Take medicine that weakens the immune system, such as corticosteroids  Had a bone marrow or organ transplant  Have an immune deficiency  Have poorly controlled HIV or AIDS  Are obese (body mass index of 40 or higher)  Smoke regularly   o Caregivers should wear gloves while washing dishes, handling laundry and cleaning bedrooms and bathrooms.  o Use caution when washing and drying laundry: Don't shake dirty laundry, and use the warmest water setting that you can.  o For more tips, go to www.cdc.gov/coronavirus/2019-ncov/downloads/10Things.pdf.    4.Sign up for [employer]. We know it's scary to hear that you might have COVID-19. We want to track your symptoms to make sure you're okay over the next 2 weeks. Please look for an email from [employer]---this is a free, online program that we'll use to keep in touch. To sign up, follow the link in the email. Learn more at [email or web address]  How can I take care of myself?   Get lots of rest. Drink extra fluids (unless a doctor has told you not to).   Take Tylenol (acetaminophen) for fever or pain. If you have liver or kidney problems, ask your family doctor if it's okay to take Tylenol.   Adults can take either:    650 mg (two 325 mg pills) every 4 to 6 hours, or...   1,000 mg (two 500 mg pills) every 8 hours as needed.    Note: Don't take more than 3,000 mg in one day. Acetaminophen  is found in many medicines (both prescribed and over-the-counter medicines). Read all labels to be sure you don't take too much.   For children, check the Tylenol bottle for the right dose. The dose is based on the child's age or weight.    If you have other health problems (like cancer, heart failure, an organ transplant or severe kidney disease): Call your specialty clinic if you don't feel better in the next 2 days.       Know when to call 911. Emergency warning signs include:    Trouble breathing or shortness of breath Pain or pressure in the chest that doesn't go away Feeling confused like you haven't felt before, or not being able to wake up Bluish-colored lips or face.  Where can I get more information?    [facility] -- About COVID-19: [email or web address]   CDC -- What to Do If You're Sick: www.cdc.gov/coronavirus/2019-ncov/about/steps-when-sick.html   CDC -- Ending Home Isolation: www.cdc.gov/coronavirus/2019-ncov/hcp/disposition-in-home-patients.html   CDC -- Caring for Someone: www.cdc.gov/coronavirus/2019-ncov/if-you-are-sick/care-for-someone.html   [facility] -- Interim Guidance for Hospital Discharge to Home: www.health.[facility].[place]/diseases/coronavirus/hcp/hospdischarge.pdf   [facility] clinical trials (COVID-19 research studies): clinicalaffairs.[facility]-clinical-trials    Below are the COVID-19 hotlines at the [facility] ([facility]). Interpreters are available.    For health questions: Call 715-857-8836 or 1-309.842.2221 (7 a.m. to 7 p.m.) For questions about schools and childcare: Call 371-295-4135 or 1-671.323.6297 (7 a.m. to 7 p.m.)    Diagnosis: Acute upper respiratory infection, unspecified  Diagnosis ICD: J06.9  Additional Clinician Notes: if your symptoms are not improving or worsen, Please go to one of our urgent care locations for evaluation.

## 2020-07-30 DIAGNOSIS — Z20.822 COVID-19 RULED OUT: Primary | ICD-10-CM

## 2020-07-30 PROCEDURE — U0003 INFECTIOUS AGENT DETECTION BY NUCLEIC ACID (DNA OR RNA); SEVERE ACUTE RESPIRATORY SYNDROME CORONAVIRUS 2 (SARS-COV-2) (CORONAVIRUS DISEASE [COVID-19]), AMPLIFIED PROBE TECHNIQUE, MAKING USE OF HIGH THROUGHPUT TECHNOLOGIES AS DESCRIBED BY CMS-2020-01-R: HCPCS | Performed by: FAMILY MEDICINE

## 2020-07-31 LAB
SARS-COV-2 RNA SPEC QL NAA+PROBE: NOT DETECTED
SPECIMEN SOURCE: NORMAL

## 2021-01-10 ENCOUNTER — HEALTH MAINTENANCE LETTER (OUTPATIENT)
Age: 56
End: 2021-01-10

## 2021-02-23 ENCOUNTER — OFFICE VISIT (OUTPATIENT)
Dept: INTERNAL MEDICINE | Facility: CLINIC | Age: 56
End: 2021-02-23
Payer: COMMERCIAL

## 2021-02-23 ENCOUNTER — TELEPHONE (OUTPATIENT)
Dept: INTERNAL MEDICINE | Facility: CLINIC | Age: 56
End: 2021-02-23

## 2021-02-23 VITALS
BODY MASS INDEX: 34.11 KG/M2 | WEIGHT: 265.8 LBS | HEART RATE: 69 BPM | TEMPERATURE: 97.7 F | DIASTOLIC BLOOD PRESSURE: 88 MMHG | OXYGEN SATURATION: 99 % | SYSTOLIC BLOOD PRESSURE: 136 MMHG | HEIGHT: 74 IN

## 2021-02-23 DIAGNOSIS — S83.207D ACUTE MENISCAL TEAR OF LEFT KNEE, SUBSEQUENT ENCOUNTER: Primary | ICD-10-CM

## 2021-02-23 DIAGNOSIS — E78.5 HYPERLIPIDEMIA LDL GOAL <160: ICD-10-CM

## 2021-02-23 DIAGNOSIS — E11.9 TYPE 2 DIABETES MELLITUS WITHOUT COMPLICATION, WITHOUT LONG-TERM CURRENT USE OF INSULIN (H): ICD-10-CM

## 2021-02-23 DIAGNOSIS — Z72.0 TOBACCO ABUSE: ICD-10-CM

## 2021-02-23 DIAGNOSIS — Z01.818 PREOP GENERAL PHYSICAL EXAM: ICD-10-CM

## 2021-02-23 DIAGNOSIS — N52.9 ERECTILE DYSFUNCTION, UNSPECIFIED ERECTILE DYSFUNCTION TYPE: ICD-10-CM

## 2021-02-23 LAB
ALT SERPL W P-5'-P-CCNC: 43 U/L (ref 0–70)
ANION GAP SERPL CALCULATED.3IONS-SCNC: 3 MMOL/L (ref 3–14)
AST SERPL W P-5'-P-CCNC: 21 U/L (ref 0–45)
BUN SERPL-MCNC: 15 MG/DL (ref 7–30)
CALCIUM SERPL-MCNC: 9.8 MG/DL (ref 8.5–10.1)
CHLORIDE SERPL-SCNC: 102 MMOL/L (ref 94–109)
CHOLEST SERPL-MCNC: 297 MG/DL
CO2 SERPL-SCNC: 28 MMOL/L (ref 20–32)
CREAT SERPL-MCNC: 1.11 MG/DL (ref 0.66–1.25)
ERYTHROCYTE [DISTWIDTH] IN BLOOD BY AUTOMATED COUNT: 12.2 % (ref 10–15)
GFR SERPL CREATININE-BSD FRML MDRD: 73 ML/MIN/{1.73_M2}
GLUCOSE SERPL-MCNC: 146 MG/DL (ref 70–99)
HBA1C MFR BLD: 7.7 % (ref 0–5.6)
HCT VFR BLD AUTO: 47.2 % (ref 40–53)
HDLC SERPL-MCNC: 44 MG/DL
HGB BLD-MCNC: 16.8 G/DL (ref 13.3–17.7)
LDLC SERPL CALC-MCNC: 187 MG/DL
MCH RBC QN AUTO: 30.3 PG (ref 26.5–33)
MCHC RBC AUTO-ENTMCNC: 35.6 G/DL (ref 31.5–36.5)
MCV RBC AUTO: 85 FL (ref 78–100)
NONHDLC SERPL-MCNC: 253 MG/DL
PLATELET # BLD AUTO: 267 10E9/L (ref 150–450)
POTASSIUM SERPL-SCNC: 4.1 MMOL/L (ref 3.4–5.3)
RBC # BLD AUTO: 5.54 10E12/L (ref 4.4–5.9)
SODIUM SERPL-SCNC: 133 MMOL/L (ref 133–144)
TRIGL SERPL-MCNC: 329 MG/DL
WBC # BLD AUTO: 9.6 10E9/L (ref 4–11)

## 2021-02-23 PROCEDURE — 84460 ALANINE AMINO (ALT) (SGPT): CPT | Performed by: INTERNAL MEDICINE

## 2021-02-23 PROCEDURE — 80048 BASIC METABOLIC PNL TOTAL CA: CPT | Performed by: INTERNAL MEDICINE

## 2021-02-23 PROCEDURE — 85027 COMPLETE CBC AUTOMATED: CPT | Performed by: INTERNAL MEDICINE

## 2021-02-23 PROCEDURE — 99215 OFFICE O/P EST HI 40 MIN: CPT | Performed by: INTERNAL MEDICINE

## 2021-02-23 PROCEDURE — 84450 TRANSFERASE (AST) (SGOT): CPT | Performed by: INTERNAL MEDICINE

## 2021-02-23 PROCEDURE — 36415 COLL VENOUS BLD VENIPUNCTURE: CPT | Performed by: INTERNAL MEDICINE

## 2021-02-23 PROCEDURE — 80061 LIPID PANEL: CPT | Performed by: INTERNAL MEDICINE

## 2021-02-23 PROCEDURE — 83036 HEMOGLOBIN GLYCOSYLATED A1C: CPT | Performed by: INTERNAL MEDICINE

## 2021-02-23 RX ORDER — VARENICLINE TARTRATE 1 MG/1
1 TABLET, FILM COATED ORAL 2 TIMES DAILY
Qty: 180 TABLET | Refills: 1 | Status: SHIPPED | OUTPATIENT
Start: 2021-02-23 | End: 2021-08-22

## 2021-02-23 RX ORDER — ATORVASTATIN CALCIUM 20 MG/1
20 TABLET, FILM COATED ORAL AT BEDTIME
Qty: 90 TABLET | Refills: 1 | Status: SHIPPED | OUTPATIENT
Start: 2021-02-23 | End: 2021-07-29 | Stop reason: SINTOL

## 2021-02-23 RX ORDER — SILDENAFIL 100 MG/1
50-100 TABLET, FILM COATED ORAL DAILY PRN
Qty: 30 TABLET | Refills: 5 | Status: SHIPPED | OUTPATIENT
Start: 2021-02-23 | End: 2023-12-31

## 2021-02-23 RX ORDER — METFORMIN HCL 500 MG
1000 TABLET, EXTENDED RELEASE 24 HR ORAL
Qty: 180 TABLET | Refills: 0 | Status: SHIPPED | OUTPATIENT
Start: 2021-02-23 | End: 2021-05-17

## 2021-02-23 ASSESSMENT — MIFFLIN-ST. JEOR: SCORE: 2100.41

## 2021-02-23 NOTE — TELEPHONE ENCOUNTER
"I tried to call the patient to review the lab results.   I also sent this message as a [employer] result note, I want to make sure he gets the message    Left message for patient to call back.       When he call back give him this message:    Your labs all look good with 2 exceptions:    1.  The LDL (\"bad\") cholesterol was elevated as expected.  Please start the atorvastatin 20 mg once per day every day.    2.  The blood glucose/diabetes labs show that there has been worsening over the past year, enough now that this is officially type 2 diabetes.  The A1C lab estimates your average glucose over the preceding 90 days.  Evaluate above 6.5, two times in a row indicates type 2 diabetes.  To manage the diabetes follows instructions:  --start Medication to lower glucose: Metformin 1000 mg (2 x 500 mg) once per day with the evening meal (I sent the prescription to your pharmacy)  --The most common side effect of this medicine and the new or worsened loose stools.  Contact me if you have any side effects of concern.  --Reevaluate your diet, reduce the intake of \"simple carbohydrates\" (e.g. White bread, white rice, pasta, noodles, potatoes, snack foods, regular soda, juices (except fresh squeezed), cakes, cookies, candy, etc.) as best possible  --I would strongly recommend a referral with our diabetes educators to learn more about managing diabetes, including meal planning, glucose monitoring.   I entered a referral, they will contact you to set up an appointment at your convenience.    You get have your surgery, I completed all the preoperative paperwork.      Continue all other medications at the same doses.  Contact your usual pharmacy if you need refills.     Return to see me in 3 months, sooner if needed.  Please get fasting labs done at the [facility] or any other [facility] lab 1-2 days before this appointment (schedule a \"lab appointment\").  If you get the labs done at another clinic, make " arrangements with them directly.  The orders will be in place.  Eat nothing for at least 8 hours prior to having these labs drawn.  Use [employer] or Call [phone] to schedule the appointment with me and lab appointment.

## 2021-02-23 NOTE — PROGRESS NOTES
[address] [address]  Phone: [phone]  Primary Provider: Ibrahima Campbell  Pre-op Performing Provider: IBRAHIMA CAMPBELL      PREOPERATIVE EVALUATION:  Today's date: 2/23/2021    Ricki Blanchard is a 55 year old male who presents for a preoperative evaluation.    Surgical Information:  Surgery/Procedure: LEFT knee arthroscopy partial medial meniscectomy  Surgery Location: [facility]   Surgeon: Trent Cali  Surgery Date: 3/2/2021  Time of Surgery: 3:15PM  Where patient plans to recover: At home with family  Fax number for surgical facility: [phone]    Type of Anesthesia Anticipated: to be determined    Assessment & Plan     The proposed surgical procedure is considered LOW risk.    1. Acute meniscal tear of left knee, subsequent encounter    2. Preop general physical exam    3. Type 2 diabetes mellitus without complication, without long-term current use of insulin (H)    4. Hyperlipidemia LDL goal <160    5. Tobacco abuse    6. Erectile dysfunction, unspecified erectile dysfunction type              Risks and Recommendations:  The patient has the following additional risks and recommendations for perioperative complications:    Cardiovascular:  --no active cardiac symptoms.     Diabetes:  --just started on metformin.  Hold on the morning of surgery.     Pulmonary:   History of smoking without history of active lung disease of symptoms.   Observe for post op bronchospasm, treat with albuterol nebs as needed.     Medication Instructions:   - Bleeding risk is relatively low for this procedure.  No ASA or NSAIDs within 7 days of surgery.  No fish oil or Vitamin E within 7 days of surgery.        - metformin: HOLD day of surgery.    RECOMMENDATION:  APPROVAL GIVEN to proceed with proposed procedure, without further diagnostic evaluation.                      Subjective     HPI related to upcoming procedure: torn meniscus  left knee.     Preop Questions 2/23/2021   1. Have you ever had a heart attack or stroke? No   2. Have you ever had surgery on your heart or blood vessels, such as a stent placement, a coronary artery bypass, or surgery on an artery in your head, neck, heart, or legs? No   3. Do you have chest pain with activity? No   4. Do you have a history of  heart failure? No   5. Do you currently have a cold, bronchitis or symptoms of other infection? No   6. Do you have a cough, shortness of breath, or wheezing? No   7. Do you or anyone in your family have previous history of blood clots? No   8. Do you or does anyone in your family have a serious bleeding problem such as prolonged bleeding following surgeries or cuts? No   9. Have you ever had problems with anemia or been told to take iron pills? No   10. Have you had any abnormal blood loss such as black, tarry or bloody stools? No   11. Have you ever had a blood transfusion? No   12. Are you willing to have a blood transfusion if it is medically needed before, during, or after your surgery? Yes   13. Have you or any of your relatives ever had problems with anesthesia? No   14. Do you have sleep apnea, excessive snoring or daytime drowsiness? No   15. Do you have any artifical heart valves or other implanted medical devices like a pacemaker, defibrillator, or continuous glucose monitor? No   16. Do you have artificial joints? No   17. Are you allergic to latex? No       Health Care Directive:  Patient does not have a Health Care Directive or Living Will: Discussed advance care planning with patient; however, patient declined at this time.    Preoperative Review of :   reviewed - no record of controlled substances prescribed.      *  No recent infectious illnesses.    *  No recent cardiac or pulmonary issues or symptoms.    *  No problems performing vigorous physical activity, no changes in exercise tolerance.    *  No personal or family history of anesthesia  complications.    *  No personal or family history of bleeding or clotting disorders.           Review of Systems  Constitutional, neuro, ENT, endocrine, pulmonary, cardiac, gastrointestinal, genitourinary, musculoskeletal, integument and psychiatric systems are negative, except as otherwise noted.    Patient Active Problem List    Diagnosis Date Noted     Type 2 diabetes mellitus without complication, without long-term current use of insulin (H) 02/26/2020     Priority: Medium     A1C 6.8       Syncope      Priority: Medium     NSVT (nonsustained ventricular tachycardia) (H)      Priority: Medium     Prediabetes      Priority: Medium     A1C 6.5       Obesity (BMI 30-39.9) 04/28/2014     Priority: Medium     BMI 33.4       Other postprocedural status(V45.89) 12/29/2011     Priority: Medium     Neck pain 10/13/2011     Priority: Medium     Cervical radiculopathy 10/13/2011     Priority: Medium     Hyperlipidemia LDL goal <160 09/01/2011     Priority: Medium            Migraine headache 10/25/2010     Priority: Medium     (Problem list name updated by automated process. Provider to review and confirm.)       Tobacco abuse 10/25/2010     Priority: Medium      Past Medical History:   Diagnosis Date     Chronic pain     disc problems     Headache(784.0)     like migraines     Hyperlipidemia LDL goal <160 9/1/2011         Migraine headaches 10/25/2010     Neck pain      NSVT (nonsustained ventricular tachycardia) (H)      Obesity (BMI 30-39.9) 4/28/14    BMI 33.4     Prediabetes     A1C 6.5     Syncope      Tobacco abuse 10/25/2010     Type 2 diabetes mellitus without complication, without long-term current use of insulin (H) 02/26/2020    A1C 6.8     Past Surgical History:   Procedure Laterality Date     ARTHROSCOPY KNEE Right 10/14/2014    meniscus repair right knee @ [facility]     EP ABLATION / EP STUDIES  09/25/2017    Modification of the slow pathway of the atrioventricular node to prevent presumptive  "typical AVNRT     MR CARDIAC W CONTRAST AND STRESS  2017    negative cardiac MRI     REPLACE DISK CERVICAL ANTERIOR  2011    Procedure:REPLACE DISK CERVICAL ANTERIOR; C5-6 ARTHROPLASTY (PRO DISC-C) (c-arm); Surgeon:ZINA JOSHI; Location: OR     Current Outpatient Medications   Medication Sig Dispense Refill     atorvastatin (LIPITOR) 20 MG tablet Take 1 tablet (20 mg) by mouth At Bedtime 90 tablet 1     metFORMIN (GLUCOPHAGE-XR) 500 MG 24 hr tablet Take 2 tablets (1,000 mg) by mouth daily (with dinner) 180 tablet 0     sildenafil (VIAGRA) 100 MG tablet Take 0.5-1 tablets ( mg) by mouth daily as needed (erectile dysfunction) 30 tablet 5     varenicline (CHANTIX) 1 MG tablet Take 1 tablet (1 mg) by mouth 2 times daily 180 tablet 1     ibuprofen (ADVIL) 200 MG capsule Take 200 mg by mouth as needed for fever         Allergies   Allergen Reactions     Seasonal Allergies Other (See Comments)     Sneezing, watery eyes        Social History     Tobacco Use     Smoking status: Current Every Day Smoker     Packs/day: 0.50     Years: 20.00     Pack years: 10.00     Types: Cigarettes     Last attempt to quit: 2011     Years since quittin.2     Smokeless tobacco: Never Used     Tobacco comment: 2-1 pk daily   Substance Use Topics     Alcohol use: No     Alcohol/week: 0.0 standard drinks     Family History   Problem Relation Age of Onset     Lipids Mother      Skin Cancer Mother      Lipids Father      Skin Cancer Father      Skin Cancer Maternal Grandmother      Skin Cancer Maternal Grandfather      History   Drug Use No         Objective     /88   Pulse 69   Temp 97.7  F (36.5  C) (Temporal)   Ht 1.88 m (6' 2\")   Wt 120.6 kg (265 lb 12.8 oz)   SpO2 99%   BMI 34.13 kg/m      Physical Exam  GENERAL alert and no distress  EYES:  Normal sclera,conjunctiva, EOMI  HENT: oral and posterior pharynx without lesions or erythema, facies symmetric  NECK: Neck supple. No LAD, without " thyroidmegaly.  RESP: Clear to ausculation bilaterally without wheezes or crackles. Normal BS in all fields.  CV: RRR normal S1S2 without murmurs, rubs or gallops.  LYMPH: no cervical lymph adenopathy appreciated  MS: extremities- no gross deformities of the visible extremities noted,   EXT:  no lower extremity edema  PSYCH: Alert and oriented times 3; speech- coherent  SKIN:  No obvious significant skin lesions on visible portions of face     Recent Labs   Lab Test 02/23/21  0844   HGB 16.8         POTASSIUM 4.1   CR 1.11   A1C 7.7      Results for orders placed or performed in visit on 02/23/21   Hemoglobin A1c     Status: Abnormal   Result Value Ref Range    Hemoglobin A1C 7.7 (H) 0 - 5.6 %   Basic metabolic panel     Status: Abnormal   Result Value Ref Range    Sodium 133 133 - 144 mmol/L    Potassium 4.1 3.4 - 5.3 mmol/L    Chloride 102 94 - 109 mmol/L    Carbon Dioxide 28 20 - 32 mmol/L    Anion Gap 3 3 - 14 mmol/L    Glucose 146 (H) 70 - 99 mg/dL    Urea Nitrogen 15 7 - 30 mg/dL    Creatinine 1.11 0.66 - 1.25 mg/dL    GFR Estimate 73 >60 mL/min/[1.73_m2]    GFR Estimate If Black 85 >60 mL/min/[1.73_m2]    Calcium 9.8 8.5 - 10.1 mg/dL   CBC with platelets     Status: None   Result Value Ref Range    WBC 9.6 4.0 - 11.0 10e9/L    RBC Count 5.54 4.4 - 5.9 10e12/L    Hemoglobin 16.8 13.3 - 17.7 g/dL    Hematocrit 47.2 40.0 - 53.0 %    MCV 85 78 - 100 fl    MCH 30.3 26.5 - 33.0 pg    MCHC 35.6 31.5 - 36.5 g/dL    RDW 12.2 10.0 - 15.0 %    Platelet Count 267 150 - 450 10e9/L   Lipid panel reflex to direct LDL Fasting     Status: Abnormal   Result Value Ref Range    Cholesterol 297 (H) <200 mg/dL    Triglycerides 329 (H) <150 mg/dL    HDL Cholesterol 44 >39 mg/dL    LDL Cholesterol Calculated 187 (H) <100 mg/dL    Non HDL Cholesterol 253 (H) <130 mg/dL   AST     Status: None   Result Value Ref Range    AST 21 0 - 45 U/L   ALT     Status: None   Result Value Ref Range    ALT 43 0 - 70 U/L         Diagnostics:  Labs pending at this time.  Results will be reviewed when available.   No EKG required for low risk surgery.  No EKG required, no history of coronary heart disease, significant arrhythmia, peripheral arterial disease or other structural heart disease.    Revised Cardiac Risk Index (RCRI):  The patient has the following serious cardiovascular risks for perioperative complications:   - No serious cardiac risks = 0 points     RCRI Interpretation: 0 points: Class I (very low risk - 0.4% complication rate)             Signed Electronically by: Ibrahima Campbell MD  Copy of this evaluation report is provided to requesting physician.    [facility] Guidelines    Revised Cardiac Risk Index

## 2021-02-23 NOTE — PATIENT INSTRUCTIONS
*  Start Atorvastatin 20 mg once per day    *  I will make recommendations about the pre-diabetes/diabetes basedon the labs today          PRE-OPERATIVE INSTRUCTIONS:     *  Contact your surgeon if there is any change in your health. This includes signs of new infection, such as cold or flu (such as a sore throat, runny nose, cough, rash or fever)      *  Complete any Covid testing within 48-72 hours of the surgery.  The surgeon's office is responsible for arranging and completing this testing before surgery.  Contact the surgery office for questions about this.      *  Stop aspirin of any kind for 7 days before procedure.   (even low dose daily aspirin).    *  No NSAIDs (Motrin, Advil, ibuprofen, Aleve, etc) within 5-7 days of surgery.    *  Stop any Fish Oil or vitamin E supplements for 7 days prior to surgery because these can affect platelet function.      *  Tylenol (acetaminophen) OK to take if needed for pain or headache.  Follow instructions on the bottle    *  Prepare your body as instructed by the surgery clinic.  If instructed, Take a shower or bath the night before surgery. Use any special soaps or cleaning instructions according to instructions from your surgeon.  If you do not have soap from your surgeon, use your regular soap. Do not shave or scrub the surgery site.  Wear clean pajamas and have clean sheets on your bed     *  ON THE MORNING OF SURGERY:     --take all usual medications     *  Resume all medications at the same doses after surgery, unless instructed otherwise by the medical staff.     *  Attend all follow up appointments with the surgeons (and/or therapists if applicable) as instructed.     *  Contact the surgeon's office for any specific questions about after-surgery cares and follow up instructions.        IF YOU REQUIRE NARCOTIC PAIN MEDICATION AFTER YOUR SURGERY:    --Take the narcotic pain medication exactly as prescribed, and use the absolute lowest dose needed for pain control.    The goal of pain medication is not complete pain relief, aim to just make it manageable.    --Beware of drowsiness, nausea, vomiting, when taking this medication.  Do not drive, or operate dangerous equipment after taking this.    --The main side effects from narcotic pain medication can also include intestinal side effects including nausea, vomiting, constipation, or diarrhea.    --If your pain is not able to be controlled with the pain medication supplied to you, contact the surgeons because uncontrolled pain can be a sign of possible surgical complications.    --In case of constipation from pain medications, take over the counter Miralax powder or stool softner Senokot.  If you have a history of constipation with narcotic pain medication, consider taking Miralax powder on any day that you take a pain tablet.

## 2021-02-24 ENCOUNTER — MYC MEDICAL ADVICE (OUTPATIENT)
Dept: INTERNAL MEDICINE | Facility: CLINIC | Age: 56
End: 2021-02-24

## 2021-02-24 DIAGNOSIS — E11.9 TYPE 2 DIABETES MELLITUS WITHOUT COMPLICATION, WITHOUT LONG-TERM CURRENT USE OF INSULIN (H): Primary | ICD-10-CM

## 2021-02-24 RX ORDER — PROCHLORPERAZINE 25 MG/1
SUPPOSITORY RECTAL
Qty: 3 EACH | Refills: 11 | Status: SHIPPED | OUTPATIENT
Start: 2021-02-24 | End: 2021-10-27

## 2021-02-24 RX ORDER — PROCHLORPERAZINE 25 MG/1
SUPPOSITORY RECTAL
Qty: 1 EACH | Refills: 0 | Status: SHIPPED | OUTPATIENT
Start: 2021-02-24 | End: 2021-10-27

## 2021-02-24 NOTE — TELEPHONE ENCOUNTER
Spoke with patient and he is aware of message below, he saw message on MyChart. He verbalized an understanding and agrees with plan.    COLE Barlow

## 2021-02-25 ENCOUNTER — TELEPHONE (OUTPATIENT)
Dept: INTERNAL MEDICINE | Facility: CLINIC | Age: 56
End: 2021-02-25

## 2021-02-25 NOTE — TELEPHONE ENCOUNTER
"Per pharmacy    \"Alt requested.  Both Dexcom sensor RX and dexcom  device RX not covered by insurance\"    [id number]  [phone] fx  [phone] phone  "

## 2021-02-25 NOTE — TELEPHONE ENCOUNTER
Please contact the patient    His insurance will not cover the personal continuous glucose monitor .     He can still get one, but he will have to pay for it.  The prescription is still good at [facility] just tell them that he is paying cash for it.     He can most liekly use HSA or [employer] spending accounts money for this    I still think this device would be very helpful in managing his diabetes since it will show him the effects of lifestyle and diet changes directly, encouraging the better behaviors.     If he does not want to pay for the personal continuous glucose monitor, then we can send a prescription for the standard glucometer.    Close encounter when done.

## 2021-03-31 ENCOUNTER — IMMUNIZATION (OUTPATIENT)
Dept: NURSING | Facility: CLINIC | Age: 56
End: 2021-03-31
Payer: COMMERCIAL

## 2021-03-31 PROCEDURE — 0001A PR COVID VAC PFIZER DIL RECON 30 MCG/0.3 ML IM: CPT

## 2021-03-31 PROCEDURE — 91300 PR COVID VAC PFIZER DIL RECON 30 MCG/0.3 ML IM: CPT

## 2021-04-21 ENCOUNTER — IMMUNIZATION (OUTPATIENT)
Dept: NURSING | Facility: CLINIC | Age: 56
End: 2021-04-21
Attending: INTERNAL MEDICINE
Payer: COMMERCIAL

## 2021-04-21 PROCEDURE — 0002A PR COVID VAC PFIZER DIL RECON 30 MCG/0.3 ML IM: CPT

## 2021-04-21 PROCEDURE — 91300 PR COVID VAC PFIZER DIL RECON 30 MCG/0.3 ML IM: CPT

## 2021-05-17 DIAGNOSIS — E11.9 TYPE 2 DIABETES MELLITUS WITHOUT COMPLICATION, WITHOUT LONG-TERM CURRENT USE OF INSULIN (H): ICD-10-CM

## 2021-05-17 RX ORDER — METFORMIN HCL 500 MG
1000 TABLET, EXTENDED RELEASE 24 HR ORAL
Qty: 180 TABLET | Refills: 0 | Status: SHIPPED | OUTPATIENT
Start: 2021-05-17 | End: 2021-10-27

## 2021-05-27 DIAGNOSIS — E11.9 TYPE 2 DIABETES MELLITUS WITHOUT COMPLICATION, WITHOUT LONG-TERM CURRENT USE OF INSULIN (H): ICD-10-CM

## 2021-05-27 LAB
ALT SERPL W P-5'-P-CCNC: 39 U/L (ref 0–70)
ANION GAP SERPL CALCULATED.3IONS-SCNC: 6 MMOL/L (ref 3–14)
AST SERPL W P-5'-P-CCNC: 30 U/L (ref 0–45)
BUN SERPL-MCNC: 12 MG/DL (ref 7–30)
CALCIUM SERPL-MCNC: 9.1 MG/DL (ref 8.5–10.1)
CHLORIDE SERPL-SCNC: 101 MMOL/L (ref 94–109)
CHOLEST SERPL-MCNC: 287 MG/DL
CO2 SERPL-SCNC: 27 MMOL/L (ref 20–32)
CREAT SERPL-MCNC: 1.17 MG/DL (ref 0.66–1.25)
GFR SERPL CREATININE-BSD FRML MDRD: 69 ML/MIN/{1.73_M2}
GLUCOSE SERPL-MCNC: 85 MG/DL (ref 70–99)
HBA1C MFR BLD: 6.3 % (ref 0–5.6)
HDLC SERPL-MCNC: 44 MG/DL
LDLC SERPL CALC-MCNC: 214 MG/DL
NONHDLC SERPL-MCNC: 243 MG/DL
POTASSIUM SERPL-SCNC: 3.8 MMOL/L (ref 3.4–5.3)
SODIUM SERPL-SCNC: 134 MMOL/L (ref 133–144)
TRIGL SERPL-MCNC: 147 MG/DL

## 2021-05-27 PROCEDURE — 80061 LIPID PANEL: CPT | Performed by: INTERNAL MEDICINE

## 2021-05-27 PROCEDURE — 36415 COLL VENOUS BLD VENIPUNCTURE: CPT | Performed by: INTERNAL MEDICINE

## 2021-05-27 PROCEDURE — 83036 HEMOGLOBIN GLYCOSYLATED A1C: CPT | Performed by: INTERNAL MEDICINE

## 2021-05-27 PROCEDURE — 84450 TRANSFERASE (AST) (SGOT): CPT | Performed by: INTERNAL MEDICINE

## 2021-05-27 PROCEDURE — 80048 BASIC METABOLIC PNL TOTAL CA: CPT | Performed by: INTERNAL MEDICINE

## 2021-05-27 PROCEDURE — 84460 ALANINE AMINO (ALT) (SGPT): CPT | Performed by: INTERNAL MEDICINE

## 2021-07-28 ENCOUNTER — MYC MEDICAL ADVICE (OUTPATIENT)
Dept: INTERNAL MEDICINE | Facility: CLINIC | Age: 56
End: 2021-07-28

## 2021-07-28 DIAGNOSIS — E78.5 HYPERLIPIDEMIA LDL GOAL <160: ICD-10-CM

## 2021-07-28 DIAGNOSIS — E11.9 TYPE 2 DIABETES MELLITUS WITHOUT COMPLICATION, WITHOUT LONG-TERM CURRENT USE OF INSULIN (H): ICD-10-CM

## 2021-07-29 RX ORDER — ROSUVASTATIN CALCIUM 10 MG/1
10 TABLET, COATED ORAL DAILY
Qty: 90 TABLET | Refills: 0 | Status: SHIPPED | OUTPATIENT
Start: 2021-07-29 | End: 2021-10-28

## 2021-07-29 NOTE — TELEPHONE ENCOUNTER
"Stop atorvastatin due to possible muscle aches.     Wait 1-2 weeks, the muscle symptoms should go away.     Start rosuvastatin 10 mg once per day, this medication has a lower chance of producing muscle side effects.   I sent a prescription to your pharmacy for this medication.     The nutritional supplement CoQ10 200 mg per day can help muscle pains and also help reduce the chance of musculoskeletal and soft tissue side effects from \"statin\" cholesterol lowering medications.  You can take any over the counter version and find at any grocery or drug store.  The cheapest prices are usually found at [employer] or [employer].       Return to see me in approximately 2 month, sooner if needed.  Please get fasting labs done in the [facility] lab 1-2 days before this appointment (make a  \"lab appointment\").  Eat nothing for at least 8 hours prior to having these labs drawn.  Use [employer] or Call [phone] to schedule the appointment with me and lab appointment.   "

## 2021-09-07 ENCOUNTER — NURSE TRIAGE (OUTPATIENT)
Dept: NURSING | Facility: CLINIC | Age: 56
End: 2021-09-07

## 2021-09-07 ENCOUNTER — VIRTUAL VISIT (OUTPATIENT)
Dept: FAMILY MEDICINE | Facility: CLINIC | Age: 56
End: 2021-09-07
Payer: COMMERCIAL

## 2021-09-07 ENCOUNTER — LAB (OUTPATIENT)
Dept: URGENT CARE | Facility: URGENT CARE | Age: 56
End: 2021-09-07
Attending: FAMILY MEDICINE
Payer: COMMERCIAL

## 2021-09-07 DIAGNOSIS — R05.9 COUGH: Primary | ICD-10-CM

## 2021-09-07 DIAGNOSIS — R05.9 COUGH: ICD-10-CM

## 2021-09-07 DIAGNOSIS — Z20.822 EXPOSURE TO COVID-19 VIRUS: ICD-10-CM

## 2021-09-07 PROCEDURE — U0003 INFECTIOUS AGENT DETECTION BY NUCLEIC ACID (DNA OR RNA); SEVERE ACUTE RESPIRATORY SYNDROME CORONAVIRUS 2 (SARS-COV-2) (CORONAVIRUS DISEASE [COVID-19]), AMPLIFIED PROBE TECHNIQUE, MAKING USE OF HIGH THROUGHPUT TECHNOLOGIES AS DESCRIBED BY CMS-2020-01-R: HCPCS

## 2021-09-07 PROCEDURE — U0005 INFEC AGEN DETEC AMPLI PROBE: HCPCS

## 2021-09-07 PROCEDURE — 99213 OFFICE O/P EST LOW 20 MIN: CPT | Mod: 95 | Performed by: FAMILY MEDICINE

## 2021-09-07 NOTE — PROGRESS NOTES
Lance is a 55 year old who is being evaluated via a billable telephone visit.      What phone number would you like to be contacted at?  [phone]  How would you like to obtain your AVS? MyChart    Assessment & Plan     Cough  - Symptomatic COVID-19 Virus (Coronavirus) by PCR Nasopharyngeal; Future    Exposure to COVID-19 virus    Recommended symptomatic cares.  Fortunately, he is in no respiratory distress at this time.  We are going to do a COVID-19 test and he will be notified of the results when they are available.  He is planning to self quarantine until these results are back.               Tobacco Cessation:   reports that he has been smoking cigarettes. He has a 10.00 pack-year smoking history. He has never used smokeless tobacco.    He would benefit from smoking cessation.          Return in about 2 weeks (around 9/21/2021) for Follow up if symptoms not improving..    Sanjay Dietz, [facility]   Lance is a 55 year old who presents for the following health issues     HPI   He reports being exposed to someone with COVID-19 couple of weeks ago.  Over the past couple of days he began experiencing increased fatigue with body aches and an occasional cough.  He is not having fevers.  He is not short of breath.  He denies any loss of taste or smell, but he reports that food is not tasting good at this time.          Review of Systems   Constitutional, HEENT, cardiovascular, pulmonary, gi and gu systems are negative, except as otherwise noted.      Objective           Vitals:  No vitals were obtained today due to virtual visit.    Physical Exam   healthy, alert and no distress  PSYCH: Alert and oriented times 3; coherent speech, normal   rate and volume, able to articulate logical thoughts, able   to abstract reason, no tangential thoughts, no hallucinations   or delusions  His affect is normal  RESP: No cough, no audible wheezing, able to talk in full  sentences  Remainder of exam unable to be completed due to telephone visits                Phone call duration: 5 minutes

## 2021-09-08 LAB — SARS-COV-2 RNA RESP QL NAA+PROBE: NEGATIVE

## 2021-10-23 ENCOUNTER — HEALTH MAINTENANCE LETTER (OUTPATIENT)
Age: 56
End: 2021-10-23

## 2021-10-27 ENCOUNTER — LAB (OUTPATIENT)
Dept: LAB | Facility: CLINIC | Age: 56
End: 2021-10-27
Payer: COMMERCIAL

## 2021-10-27 ENCOUNTER — MYC MEDICAL ADVICE (OUTPATIENT)
Dept: INTERNAL MEDICINE | Facility: CLINIC | Age: 56
End: 2021-10-27

## 2021-10-27 ENCOUNTER — OFFICE VISIT (OUTPATIENT)
Dept: INTERNAL MEDICINE | Facility: CLINIC | Age: 56
End: 2021-10-27
Payer: COMMERCIAL

## 2021-10-27 VITALS
WEIGHT: 232 LBS | SYSTOLIC BLOOD PRESSURE: 130 MMHG | TEMPERATURE: 97.9 F | HEART RATE: 61 BPM | DIASTOLIC BLOOD PRESSURE: 80 MMHG | OXYGEN SATURATION: 97 % | HEIGHT: 74 IN | BODY MASS INDEX: 29.77 KG/M2

## 2021-10-27 DIAGNOSIS — E11.9 TYPE 2 DIABETES MELLITUS WITHOUT COMPLICATION, WITHOUT LONG-TERM CURRENT USE OF INSULIN (H): ICD-10-CM

## 2021-10-27 DIAGNOSIS — Z00.00 ROUTINE GENERAL MEDICAL EXAMINATION AT A HEALTH CARE FACILITY: Primary | ICD-10-CM

## 2021-10-27 DIAGNOSIS — E78.5 HYPERLIPIDEMIA LDL GOAL <160: ICD-10-CM

## 2021-10-27 LAB — HBA1C MFR BLD: 5.7 % (ref 0–5.6)

## 2021-10-27 PROCEDURE — 80061 LIPID PANEL: CPT

## 2021-10-27 PROCEDURE — 80053 COMPREHEN METABOLIC PANEL: CPT

## 2021-10-27 PROCEDURE — 99396 PREV VISIT EST AGE 40-64: CPT | Performed by: INTERNAL MEDICINE

## 2021-10-27 PROCEDURE — 36415 COLL VENOUS BLD VENIPUNCTURE: CPT

## 2021-10-27 PROCEDURE — 82043 UR ALBUMIN QUANTITATIVE: CPT

## 2021-10-27 PROCEDURE — 83036 HEMOGLOBIN GLYCOSYLATED A1C: CPT

## 2021-10-27 RX ORDER — METFORMIN HCL 500 MG
500 TABLET, EXTENDED RELEASE 24 HR ORAL DAILY
Qty: 90 TABLET | Refills: 1 | Status: SHIPPED | OUTPATIENT
Start: 2021-10-27 | End: 2022-04-21

## 2021-10-27 ASSESSMENT — ENCOUNTER SYMPTOMS
FREQUENCY: 0
SHORTNESS OF BREATH: 0
HEMATURIA: 0
DIARRHEA: 0
FEVER: 0
PARESTHESIAS: 0
DIZZINESS: 0
NERVOUS/ANXIOUS: 0
MYALGIAS: 0
HEADACHES: 1
NAUSEA: 0
CHILLS: 0
SORE THROAT: 0
ABDOMINAL PAIN: 0
DYSURIA: 0
COUGH: 0
WEAKNESS: 0
JOINT SWELLING: 1
HEARTBURN: 0
CONSTIPATION: 0
HEMATOCHEZIA: 0
PALPITATIONS: 0
EYE PAIN: 0
ARTHRALGIAS: 1

## 2021-10-27 ASSESSMENT — MIFFLIN-ST. JEOR: SCORE: 1947.1

## 2021-10-27 NOTE — PROGRESS NOTES
SUBJECTIVE:   CC: Ricki Blanchard is an 55 year old male who presents for preventative health visit.       Patient has been advised of split billing requirements and indicates understanding: Yes  Healthy Habits:     Getting at least 3 servings of Calcium per day:  NO    Bi-annual eye exam:  Yes    Dental care twice a year:  Yes    Sleep apnea or symptoms of sleep apnea:  Daytime drowsiness    Diet:  Diabetic and Carbohydrate counting    Frequency of exercise:  6-7 days/week    Duration of exercise:  45-60 minutes    Taking medications regularly:  Yes    Barriers to taking medications:  None    Medication side effects:  None    PHQ-2 Total Score: 0    Additional concerns today:  No      Lab results    1.  Diabetes:  In regards specifically to the patient's diabetes, they reports no unusual symptoms.  Medication compliance: good  Diabetic diet compliance: good    Patient reports no significant episodes of hypo- or hyperglycemia    Diabetic complications: none     Most  recent labs:    Lab Results   Component Value Date    A1C 5.7 10/27/2021    A1C 6.3 05/27/2021    A1C 7.7 02/23/2021    A1C 6.8 02/26/2020    A1C 6.0 09/14/2018    A1C 6.6 02/01/2016    A1C 6.4 10/06/2014    A1C 6.5 04/01/2014          Today's PHQ-2 Score:   PHQ-2 ( 1999 [employer]) 10/27/2021   Q1: Little interest or pleasure in doing things 0   Q2: Feeling down, depressed or hopeless 0   PHQ-2 Score 0   Q1: Little interest or pleasure in doing things Not at all   Q2: Feeling down, depressed or hopeless Not at all   PHQ-2 Score 0       Abuse: Current or Past(Physical, Sexual or Emotional)- No  Do you feel safe in your environment? Yes    Have you ever done Advance Care Planning? (For example, a Health Directive, POLST, or a discussion with a medical provider or your loved ones about your wishes): No, advance care planning information given to patient to review.  Patient plans to discuss their wishes with loved ones or provider.      Social History      Tobacco Use     Smoking status: Current Every Day Smoker     Packs/day: 0.50     Years: 20.00     Pack years: 10.00     Types: Cigarettes     Last attempt to quit: 2011     Years since quittin.8     Smokeless tobacco: Never Used     Tobacco comment: -1 pk daily   Substance Use Topics     Alcohol use: No     Alcohol/week: 0.0 standard drinks     If you drink alcohol do you typically have >3 drinks per day or >7 drinks per week? Not applicable    Alcohol Use 10/27/2021   Prescreen: >3 drinks/day or >7 drinks/week? No   Prescreen: >3 drinks/day or >7 drinks/week? -       Last PSA:   PSA   Date Value Ref Range Status   2020 0.76 0 - 4 ug/L Final     Comment:     Assay Method:  Chemiluminescence using Siemens Vista analyzer       Reviewed orders with patient. Reviewed health maintenance and updated orders accordingly - Yes      Reviewed and updated as needed this visit by clinical staff  Tobacco  Allergies  Meds  Problems    [facility]          Reviewed and updated as needed this visit by Provider   Allergies  Meds  Problems    [facility]           **I reviewed the information recorded in the patient's EPIC chart (including but not limited to medical history, surgical history, family history, problem list, medication list, and allergy list) and updated the information as indicated based on the patients reported information.         Review of Systems   Constitutional: Negative for chills and fever.   HENT: Negative for congestion, ear pain, hearing loss and sore throat.    Eyes: Negative for pain and visual disturbance.   Respiratory: Negative for cough and shortness of breath.    Cardiovascular: Negative for chest pain, palpitations and peripheral edema.   Gastrointestinal: Negative for abdominal pain, constipation, diarrhea, heartburn, hematochezia and nausea.   Genitourinary: Negative for discharge, dysuria, frequency, genital sores, hematuria, impotence and urgency.   Musculoskeletal: Positive  "for arthralgias and joint swelling. Negative for myalgias.   Skin: Negative for rash.   Neurological: Positive for headaches. Negative for dizziness, weakness and paresthesias.   Psychiatric/Behavioral: Negative for mood changes. The patient is not nervous/anxious.      CONSTITUTIONAL: NEGATIVE for fever, chills, change in weight  INTEGUMENTARY/SKIN: NEGATIVE for worrisome rashes, moles or lesions  EYES: NEGATIVE for vision changes or irritation  ENT: NEGATIVE for ear, mouth and throat problems  RESP: NEGATIVE for significant cough or SOB  CV: NEGATIVE for chest pain, palpitations or peripheral edema  GI: NEGATIVE for nausea, abdominal pain, heartburn, or change in bowel habits   male: negative for dysuria, hematuria, decreased urinary stream, erectile dysfunction, urethral discharge  MUSCULOSKELETAL: NEGATIVE for significant arthralgias or myalgia  NEURO: NEGATIVE for weakness, dizziness or paresthesias  PSYCHIATRIC: NEGATIVE for changes in mood or affect    OBJECTIVE:   /80   Pulse 61   Temp 97.9  F (36.6  C) (Tympanic)   Ht 1.88 m (6' 2\")   Wt 105.2 kg (232 lb)   SpO2 97%   BMI 29.79 kg/m      Physical Exam  GENERAL alert and no distress  EYES:  Normal sclera,conjunctiva, EOMI  HENT: oral and posterior pharynx without lesions or erythema, facies symmetric  NECK: Neck supple. No LAD, without thyroidmegaly.  RESP: Clear to ausculation bilaterally without wheezes or crackles. Normal BS in all fields.  CV: RRR normal S1S2 without murmurs, rubs or gallops.  LYMPH: no cervical lymph adenopathy appreciated  MS: extremities- no gross deformities of the visible extremities noted,   EXT:  no lower extremity edema  PSYCH: Alert and oriented times 3; speech- coherent  SKIN:  No obvious significant skin lesions on visible portions of face     Diagnostic Test Results:  Labs reviewed in Epic    ASSESSMENT/PLAN:     (Z00.00) Routine general medical examination at a health care facility  (primary encounter " "diagnosis)  Comment: Discussed cardiac disease risk factor modification including screening, preventing, and treating hypertension, elevated lipids, diabetes, and smoking cessation.    Discussed age appropriate cancer screening recommendations as dictated by age group and past medical history.    Recommended making better food choices as often as possible, including lower carb, lower fat, lower salt diet and moderation in any alcohol intake.    Recommended maintaining regular physical activity/exercise throughout their lifetime.  Recommended safety and injury prevention (i.e. seatbelt use, safety equipment like helmets when biking, etc).       Plan:     (E11.9) Type 2 diabetes mellitus without complication, without long-term current use of insulin (H)  Comment: This condition is currently controlled on the current medical regimen.  Continue current therapy.   Plan: metFORMIN (GLUCOPHAGE-XR) 500 MG 24 hr tablet               Patient has been advised of split billing requirements and indicates understanding: Yes  COUNSELING:   Reviewed preventive health counseling, as reflected in patient instructions       Regular exercise       Healthy diet/nutrition       Vision screening       Hearing screening       Immunizations                 Aspirin prophylaxis        Colon cancer screening       Prostate cancer screening    Estimated body mass index is 29.79 kg/m  as calculated from the following:    Height as of this encounter: 1.88 m (6' 2\").    Weight as of this encounter: 105.2 kg (232 lb).         He reports that he has been smoking cigarettes. He has a 10.00 pack-year smoking history. He has never used smokeless tobacco.  Tobacco Cessation Action Plan:   Information offered: Patient not interested at this time      Counseling Resources:  ATP IV Guidelines  Pooled Cohorts Equation Calculator  FRAX Risk Assessment  ICSI Preventive Guidelines  Dietary Guidelines for Americans, 2010  USDA's MyPlate  ASA Prophylaxis  Lung " CA Screening    Ibrahima Campbell MD  [facility]

## 2021-10-27 NOTE — PATIENT INSTRUCTIONS
*  Reduce the Metformin to 500 mg once per day with supper.     *  contineu the rosuvastatin, I will potentially adjust the dose depending on the cholesterol results from today.     *  Return to see me in approximately 6 months, sooner if needed.  Please get nonfasting labs done in the [facility] or at any other [facility] lab 1-2 days before this appointment.  If you get the labs done at another clinic, make arrangements with that clinic directly.  The orders will be in place.  Use [employer] or Call [phone] to schedule the appointment with me and lab appointment.           Investigate a personal continuous glucose monitoring  system (most common devices are Freestyle Terence and DexCom).  These systems allow for continuous glucose monitoring all throughout the day for 7-14 days and will show you the ways your glucose will fluctuate over this period by measuring your glucose every minute.  The sensor is applied to the back of your arm, and sits in place for up to 7-14 days, depending on which version you get.  You can really see how your diet and lifestyle changes affect your glucose levels.    If covered or reasonable expense, please consider getting it.      Check their web sites for more details:      --Freestyle Terence: https://www.freestylelibre.us/  For the FreestyleLIbre system, Check for eligibility for a voucher from the  for discounted Terence prices: [phone]  (may cost no more than $75/month if you are eligible)    --Dexcom:  https://www.dexcom.[employer]/    In case you are worried about keeping the CGM sensor in place, the following are all patches meant to cover/secure medical devices.  All of them have device specific options (e.g., the patches for Dexcom have a rectangular hole cut, Terence and Guardian have no hole, etc.)  The first three are all available on Amazon.  Sugar Patch is ordered direct.  Sugar Patch is popular because it comes in many patterns and designs for those  "that want to use the patch as a fashion accessory.    * SimPatch  http://Betyah.[employer]/  * GrifGrips  https://www.Soft Tissue Regeneration.[employer]/  *Skin   https://theskingrip.com/  *Sugar Patch  https://Plinktch.[employer]  *Hypafix is a good, all-purpose tape to cover most anything.  Waterproof and most people don't get any irritation.  I use the 2\" - 2 pack of 2\" x 30' rolls is $13.  https://wwwTOLTEC PHARMACE[employer]ICALS/s?k=hypafix+tape         5 GOALS IN MANAGING DIABETES (i.e. to give the best chance to prevent diabetic complications and vascular disease):     1.  Aggressive diabetic management.  The target for A1C (3 months average blood sugar) is ideally below 6.5, preferably below 7.5    Your diabetes is under good control.      Lab Results   Component Value Date    A1C 5.7 10/27/2021    A1C 6.3 05/27/2021    A1C 7.7 02/23/2021    A1C 6.8 02/26/2020    A1C 6.0 09/14/2018    A1C 6.6 02/01/2016    A1C 6.4 10/06/2014    A1C 6.5 04/01/2014       2.  Aggressive blood pressure control, under 130/80 ideally.  Using medications if needed.    Your blood pressure is under good control    BP Readings from Last 4 Encounters:   10/27/21 130/80   02/23/21 136/88   02/26/20 134/86   12/20/18 130/78       3.  Aggressive LDL cholesterol (bad cholesterol) lowering as needed.  Your goal is an LDL under 100 for sure, preferably under 70.     *  All patients with diabetes are recommended to be on a \"statin\" cholesterol lowering medication regardless of the cholesterol levels to give the best chance at avoiding vascular disease.      New guidelines identify four high-risk groups who could benefit from statins:   *people with pre-existing heart disease, such as those who have had a heart attack;   *people ages 40 to 75 who have diabetes of any type  *patients ages 40 to 75 with at least a 7.5% risk of developing cardiovascular disease over the next decade, according to a formula described in the guidelines  *patients with the sort of super-high cholesterol " "that sometimes runs in families, as evidenced by an LDL of 190 milligrams per deciliter or higher    *  Your cholesterol levels are well controlled.    Recent Labs   Lab Test 05/27/21  1435 02/23/21  1056 02/01/16  1446 04/01/14  0910   CHOL 287* 297*   < > 267*   HDL 44 44   < > 39*   * 187*   < > 183*   TRIG 147 329*   < > 223*   CHOLHDLRATIO  --   --   --  6.8*    < > = values in this interval not displayed.       4.  No smoking    5.  Consider daily preventative Aspirin once per day, every day over age 50 unless there is a specific reason that you cannot take aspirin.       *You should take Aspirin 81 mg once per day, unless you have a reason NOT to take aspirin (i.e. side effect, excessive bruising or bleeding, taking another \"blood tinning\" medication, etc.)       DIABETES REMINDERS:   1. Check your blood glucose regularly either with standard glucometer or personal continuous glucose monitor.    2) Your blood sugar goals:  before eating and  two hours after eating.  If using personal continuous glucose monitor, the goal is Time in the Target range ( ) of 70-75% with very few (under 2%) Below target.    3) Always be prepared to treat low blood sugar symptoms should it happen. Keep a sugar-containing beverage or food nearby.  4) When to call your clinic:     Blood sugar over 400     If you have 2 to 3 low blood sugars (under 70) in a row    Low readings the same time of day several days in a row  5) When to call 911: If your low blood glucose symptoms do not get better with treatment, or if you/someone else is unable to give you treatment.  6) Work with a Certified Diabetes Educator to assist you with your diabetes management  7) Contact us if you have ANY questions about your medications or instructions, or have problems with getting prescriptions filled.         Preventive Health Recommendations  Male Ages 50 - 64    Yearly exam:             See your health care provider every year in " "order to  o   Review health changes.   o   Discuss preventive care.    o   Review your medicines if your doctor has prescribed any.     Have a cholesterol test every 5 years, or more frequently if you are at risk for high cholesterol/heart disease.     Have a diabetes test (fasting glucose) every three years. If you are at risk for diabetes, you should have this test more often.     Have a colonoscopy at age 50, or have a yearly FIT test (stool test). These exams will check for colon cancer.      Talk with your health care provider about whether or not a prostate cancer screening test (PSA) is right for you.    You should be tested each year for STDs (sexually transmitted diseases), if you re at risk.     Shots: Get a flu shot each year. Get a tetanus shot every 10 years.     Nutrition:    Eat at least 5 servings of fruits and vegetables daily.     Eat whole-grain bread, whole-wheat pasta and brown rice instead of white grains and rice.     Get adequate Calcium and Vitamin D.        --Good Grains:  Oats, brown rice, Quinoa (these do not raise the blood sugar as much)     --Bad grains:  Anything made from wheat or white rice     (because these raise the blood sugars significantly, and the possible gluten issue from wheat for some people).      --Proteins:  Aim for \"lean proteins\" including chicken, fish, seafood, pork, turkey, and eggs (in moderation); Eat red meat only occasionally        Lifestyle    Exercise for at least 150 minutes a week (30 minutes a day, 5 days a week). This will help you control your weight and prevent disease.     Limit alcohol to one drink per day.     No smoking.     Wear sunscreen to prevent skin cancer.     See your dentist every six months for an exam and cleaning.     See your eye doctor every 1 to 2 years.    "

## 2021-10-28 DIAGNOSIS — E11.9 TYPE 2 DIABETES MELLITUS WITHOUT COMPLICATION, WITHOUT LONG-TERM CURRENT USE OF INSULIN (H): ICD-10-CM

## 2021-10-28 DIAGNOSIS — E78.5 HYPERLIPIDEMIA LDL GOAL <160: ICD-10-CM

## 2021-10-28 LAB
ALBUMIN SERPL-MCNC: 3.8 G/DL (ref 3.4–5)
ALP SERPL-CCNC: 51 U/L (ref 40–150)
ALT SERPL W P-5'-P-CCNC: 42 U/L (ref 0–70)
ANION GAP SERPL CALCULATED.3IONS-SCNC: 8 MMOL/L (ref 3–14)
AST SERPL W P-5'-P-CCNC: 35 U/L (ref 0–45)
BILIRUB SERPL-MCNC: 0.6 MG/DL (ref 0.2–1.3)
BUN SERPL-MCNC: 16 MG/DL (ref 7–30)
CALCIUM SERPL-MCNC: 8.8 MG/DL (ref 8.5–10.1)
CHLORIDE BLD-SCNC: 103 MMOL/L (ref 94–109)
CHOLEST SERPL-MCNC: 175 MG/DL
CO2 SERPL-SCNC: 22 MMOL/L (ref 20–32)
CREAT SERPL-MCNC: 1.13 MG/DL (ref 0.66–1.25)
CREAT UR-MCNC: 48 MG/DL
FASTING STATUS PATIENT QL REPORTED: YES
GFR SERPL CREATININE-BSD FRML MDRD: 72 ML/MIN/1.73M2
GLUCOSE BLD-MCNC: 104 MG/DL (ref 70–99)
HDLC SERPL-MCNC: 49 MG/DL
LDLC SERPL CALC-MCNC: 109 MG/DL
MICROALBUMIN UR-MCNC: <5 MG/L
MICROALBUMIN/CREAT UR: NORMAL MG/G{CREAT}
NONHDLC SERPL-MCNC: 126 MG/DL
POTASSIUM BLD-SCNC: 4.2 MMOL/L (ref 3.4–5.3)
PROT SERPL-MCNC: 7.2 G/DL (ref 6.8–8.8)
SODIUM SERPL-SCNC: 133 MMOL/L (ref 133–144)
TRIGL SERPL-MCNC: 84 MG/DL

## 2021-10-28 RX ORDER — ROSUVASTATIN CALCIUM 10 MG/1
TABLET, COATED ORAL
Qty: 90 TABLET | Refills: 3 | Status: SHIPPED | OUTPATIENT
Start: 2021-10-28 | End: 2023-01-16

## 2021-10-28 NOTE — TELEPHONE ENCOUNTER
Prescription approved per [facility] Refill Protocol.  Kashmir Chinchilla RN  [facility] Triage Nurse

## 2021-11-01 RX ORDER — FLASH GLUCOSE SENSOR
1 KIT MISCELLANEOUS
Qty: 2 EACH | Refills: 5 | Status: SHIPPED | OUTPATIENT
Start: 2021-11-01 | End: 2022-04-11

## 2021-11-01 RX ORDER — FLASH GLUCOSE SCANNING READER
1 EACH MISCELLANEOUS ONCE
Qty: 1 EACH | Refills: 0 | Status: SHIPPED | OUTPATIENT
Start: 2021-11-01 | End: 2021-11-01

## 2022-04-06 ENCOUNTER — HOSPITAL ENCOUNTER (EMERGENCY)
Facility: CLINIC | Age: 57
Discharge: HOME | End: 2022-04-06
Attending: EMERGENCY MEDICINE | Admitting: EMERGENCY MEDICINE
Payer: OTHER MISCELLANEOUS

## 2022-04-06 ENCOUNTER — TELEPHONE (OUTPATIENT)
Dept: SURGERY | Facility: CLINIC | Age: 57
End: 2022-04-06

## 2022-04-06 VITALS
BODY MASS INDEX: 29.52 KG/M2 | DIASTOLIC BLOOD PRESSURE: 74 MMHG | TEMPERATURE: 98.9 F | WEIGHT: 230 LBS | OXYGEN SATURATION: 98 % | RESPIRATION RATE: 14 BRPM | SYSTOLIC BLOOD PRESSURE: 154 MMHG | HEIGHT: 74 IN | HEART RATE: 55 BPM

## 2022-04-06 DIAGNOSIS — Z11.59 ENCOUNTER FOR SCREENING FOR OTHER VIRAL DISEASES: Primary | ICD-10-CM

## 2022-04-06 DIAGNOSIS — K42.9 PERIUMBILICAL HERNIA: ICD-10-CM

## 2022-04-06 DIAGNOSIS — K42.9 UMBILICAL HERNIA WITHOUT OBSTRUCTION AND WITHOUT GANGRENE: Primary | ICD-10-CM

## 2022-04-06 LAB
ALBUMIN SERPL-MCNC: 3.8 G/DL (ref 3.4–5)
ALP SERPL-CCNC: 55 U/L (ref 40–150)
ALT SERPL W P-5'-P-CCNC: 42 U/L (ref 0–70)
ANION GAP SERPL CALCULATED.3IONS-SCNC: 5 MMOL/L (ref 3–14)
AST SERPL W P-5'-P-CCNC: 31 U/L (ref 0–45)
BILIRUB SERPL-MCNC: 0.6 MG/DL (ref 0.2–1.3)
BUN SERPL-MCNC: 13 MG/DL (ref 7–30)
CALCIUM SERPL-MCNC: 9.2 MG/DL (ref 8.5–10.1)
CHLORIDE BLD-SCNC: 104 MMOL/L (ref 94–109)
CO2 SERPL-SCNC: 28 MMOL/L (ref 20–32)
CREAT SERPL-MCNC: 1.2 MG/DL (ref 0.66–1.25)
ERYTHROCYTE [DISTWIDTH] IN BLOOD BY AUTOMATED COUNT: 12 % (ref 10–15)
GFR SERPL CREATININE-BSD FRML MDRD: 70 ML/MIN/1.73M2
GLUCOSE BLD-MCNC: 82 MG/DL (ref 70–99)
HCT VFR BLD AUTO: 46.8 % (ref 40–53)
HGB BLD-MCNC: 16 G/DL (ref 13.3–17.7)
LIPASE SERPL-CCNC: 149 U/L (ref 73–393)
MCH RBC QN AUTO: 30.7 PG (ref 26.5–33)
MCHC RBC AUTO-ENTMCNC: 34.2 G/DL (ref 31.5–36.5)
MCV RBC AUTO: 90 FL (ref 78–100)
PLATELET # BLD AUTO: 248 10E3/UL (ref 150–450)
POTASSIUM BLD-SCNC: 3.9 MMOL/L (ref 3.4–5.3)
PROT SERPL-MCNC: 7.1 G/DL (ref 6.8–8.8)
RBC # BLD AUTO: 5.21 10E6/UL (ref 4.4–5.9)
SODIUM SERPL-SCNC: 137 MMOL/L (ref 133–144)
WBC # BLD AUTO: 11.6 10E3/UL (ref 4–11)

## 2022-04-06 PROCEDURE — 96361 HYDRATE IV INFUSION ADD-ON: CPT

## 2022-04-06 PROCEDURE — 85027 COMPLETE CBC AUTOMATED: CPT | Performed by: EMERGENCY MEDICINE

## 2022-04-06 PROCEDURE — 36415 COLL VENOUS BLD VENIPUNCTURE: CPT | Performed by: EMERGENCY MEDICINE

## 2022-04-06 PROCEDURE — 99285 EMERGENCY DEPT VISIT HI MDM: CPT | Mod: 25

## 2022-04-06 PROCEDURE — 80053 COMPREHEN METABOLIC PANEL: CPT | Performed by: EMERGENCY MEDICINE

## 2022-04-06 PROCEDURE — 250N000011 HC RX IP 250 OP 636

## 2022-04-06 PROCEDURE — 258N000003 HC RX IP 258 OP 636: Performed by: EMERGENCY MEDICINE

## 2022-04-06 PROCEDURE — 250N000011 HC RX IP 250 OP 636: Performed by: EMERGENCY MEDICINE

## 2022-04-06 PROCEDURE — 96375 TX/PRO/DX INJ NEW DRUG ADDON: CPT

## 2022-04-06 PROCEDURE — 96374 THER/PROPH/DIAG INJ IV PUSH: CPT

## 2022-04-06 PROCEDURE — 83690 ASSAY OF LIPASE: CPT | Performed by: EMERGENCY MEDICINE

## 2022-04-06 RX ORDER — FENTANYL CITRATE 50 UG/ML
100 INJECTION, SOLUTION INTRAMUSCULAR; INTRAVENOUS ONCE
Status: COMPLETED | OUTPATIENT
Start: 2022-04-06 | End: 2022-04-06

## 2022-04-06 RX ORDER — FENTANYL CITRATE 50 UG/ML
INJECTION, SOLUTION INTRAMUSCULAR; INTRAVENOUS
Status: DISCONTINUED
Start: 2022-04-06 | End: 2022-04-06 | Stop reason: HOSPADM

## 2022-04-06 RX ORDER — MORPHINE SULFATE 4 MG/ML
4 INJECTION, SOLUTION INTRAMUSCULAR; INTRAVENOUS
Status: COMPLETED | OUTPATIENT
Start: 2022-04-06 | End: 2022-04-06

## 2022-04-06 RX ORDER — OXYCODONE HYDROCHLORIDE 5 MG/1
5 TABLET ORAL EVERY 6 HOURS PRN
Qty: 12 TABLET | Refills: 0 | Status: SHIPPED | OUTPATIENT
Start: 2022-04-06 | End: 2022-04-09

## 2022-04-06 RX ORDER — FENTANYL CITRATE 50 UG/ML
INJECTION, SOLUTION INTRAMUSCULAR; INTRAVENOUS
Status: COMPLETED
Start: 2022-04-06 | End: 2022-04-06

## 2022-04-06 RX ORDER — ONDANSETRON 2 MG/ML
4 INJECTION INTRAMUSCULAR; INTRAVENOUS EVERY 30 MIN PRN
Status: DISCONTINUED | OUTPATIENT
Start: 2022-04-06 | End: 2022-04-06 | Stop reason: HOSPADM

## 2022-04-06 RX ADMIN — SODIUM CHLORIDE 1000 ML: 9 INJECTION, SOLUTION INTRAVENOUS at 13:07

## 2022-04-06 RX ADMIN — ONDANSETRON 4 MG: 2 INJECTION INTRAMUSCULAR; INTRAVENOUS at 12:46

## 2022-04-06 RX ADMIN — FENTANYL CITRATE 100 MCG: 50 INJECTION, SOLUTION INTRAMUSCULAR; INTRAVENOUS at 13:07

## 2022-04-06 RX ADMIN — FENTANYL CITRATE 100 MCG: 50 INJECTION, SOLUTION INTRAMUSCULAR; INTRAVENOUS at 13:27

## 2022-04-06 RX ADMIN — FENTANYL CITRATE 100 MCG: 50 INJECTION, SOLUTION INTRAMUSCULAR; INTRAVENOUS at 13:36

## 2022-04-06 RX ADMIN — MORPHINE SULFATE 4 MG: 4 INJECTION, SOLUTION INTRAMUSCULAR; INTRAVENOUS at 12:46

## 2022-04-06 ASSESSMENT — ENCOUNTER SYMPTOMS
ABDOMINAL PAIN: 1
NAUSEA: 1

## 2022-04-06 NOTE — ED TRIAGE NOTES
Demetrice umbilical pain that began last night, pain has worsened. Patient reports nausea. Denies fever or chills.

## 2022-04-06 NOTE — ED PROVIDER NOTES
"  History   Chief Complaint:  Abdominal Pain and Nausea       HPI   Ricki Blanchard is a 56 year old male with history of hyperlipidemia and insulin-independent diabetes mellitus who presents with abdominal pain and nausea. The patient reports initial onset of periumbilical abdominal pain yesterday while twisting an object at work. The pain then went away and returned last night around 2000 and worsened which has continued into today. He describes feeling a \"bulge\" in the area. The pain is worsened with standing or sitting. Pain is relieved when lying down. He was able to eat this morning.     Review of Systems   Gastrointestinal: Positive for abdominal pain and nausea.   All other systems reviewed and are negative.      Allergies:  No Known Drug Allergies    Medications:  Crestor  Metformin  Viagra    Past Medical History:     Hyperlipidemia  Nonsustained ventricular tachycardia  Diabetes mellitus, type II  Cervical radiculopathy    Past Surgical History:    Meniscus repair right knee   EP ablation AV node  Replace disk cervical     Family History:    Mother- skin cancer, lipids  Father- skin cancer, lipids    Social History:  Presents with his wife    Current smoker    Physical Exam     Patient Vitals for the past 24 hrs:   BP Temp Temp src Pulse Resp SpO2 Height Weight   04/06/22 1112 -- -- -- -- 14 -- -- --   04/06/22 1111 -- -- -- -- -- 98 % -- --   04/06/22 1105 (!) 154/74 98.9  F (37.2  C) Temporal 55 -- -- 1.88 m (6' 2\") 104.3 kg (230 lb)       Physical Exam  Eye:  Pupils are equal, round, and reactive.  Extraocular movements intact.    ENT:  No rhinorrhea.  Moist mucus membranes.  Normal tongue and tonsil.    Cardiac:  Regular rate and rhythm.  No murmurs, gallops, or rubs.    Pulmonary:  Clear to auscultation bilaterally.  No wheezes, rales, or rhonchi.    Abdomen: There is a focal, tender, quarter sized defect in the supraumbilical area with associated edema. There is isolated tenderness here. No " discomfort with palpation of the remainder of the abdomen.     Musculoskeletal:  Normal movement of all extremities without evidence for deficit.    Skin:  Warm and dry without rashes.    Neurologic:  Non-focal exam without asymmetric weakness or numbness.     Psychiatric:  Normal affect with appropriate interaction with examiner.     Emergency Department Course     Laboratory:  Labs Ordered and Resulted from Time of ED Arrival to Time of ED Departure   CBC WITH PLATELETS - Abnormal       Result Value    WBC Count 11.6 (*)     RBC Count 5.21      Hemoglobin 16.0      Hematocrit 46.8      MCV 90      MCH 30.7      MCHC 34.2      RDW 12.0      Platelet Count 248     COMPREHENSIVE METABOLIC PANEL - Normal    Sodium 137      Potassium 3.9      Chloride 104      Carbon Dioxide (CO2) 28      Anion Gap 5      Urea Nitrogen 13      Creatinine 1.20      Calcium 9.2      Glucose 82      Alkaline Phosphatase 55      AST 31      ALT 42      Protein Total 7.1      Albumin 3.8      Bilirubin Total 0.6      GFR Estimate 70     LIPASE - Normal    Lipase 149        Emergency Department Course:    Reviewed:  I reviewed nursing notes, vitals, past medical history and Care Everywhere    Assessments:  1249 I obtained history and examined the patient as noted above.   1335 I rechecked the patient and attempted to manipulate the hernia.      Consults:  1350 I spoke with Dr. Higgins of general surgery.   1408 I spoke with Dr. Higgins again.     Interventions:  1246: Zofran 4 mg IV   1246: Morphine 4 mg IV   1307: NS 1L IV Bolus    1307: Fentanyl 100 mcg IV   1327: Fentanyl 100 mcg IV   1336: Fentanyl 100 mcg IV     Disposition:  The patient was discharged to home.     Impression & Plan     CMS Diagnoses: None    Medical Decision Making:  This very pleasant and healthy gentleman presents to us with concerns of having periumbilical discomfort.  The patient states that he lifts weights regularly and has a physical job, having no prior abdominal  "pain.  He states that while lifting at his job yesterday, he felt a little \"twinge\" just above his bellybutton.  As the evening progressed, he started to have discomfort here and felt as though there was a small bulge.  When he awoke today, the bulge was much larger and much more painful.  He presents to us for concerns for possible hernia.  He denies having any issues with stooling, nausea, vomiting, etc.    On my exam, there is a bulge that is rather firm and tender just above his bellybutton and extending into the umbilicus area.  I gave him 3 doses of fentanyl to try to help with pain control and then tried to reduce this.  Despite firm palpation, I am unable to get it to fully reduce, though the swelling did go down substantially.  Considering my inability to fully reduce this, I spoke with my general surgeon, Dr. Higgins, who graciously agreed to come to the bedside and evaluate the patient with me.  He also attempted to reduce this hernia and was unable to do so.  He concurs there is an abdominal wall defect, but agrees that this is highly unlikely to represent any type of intestinal incarceration, more likely to be fat-containing than anything else.  His recommendation was for pain control and discharge along with a planned outpatient surgery to repair this.  The patient is in support of this as well.  I will discharge him with pain medication advised him to continue with Motrin and Tylenol.  I did explain that there is some diagnostic uncertainty here without undergoing CT of the abdomen.  If he develops any vomiting, inability to stool, or if his pain becomes intractable, he was advised to return to the hospital for admission and repair of this as an inpatient.  He and his wife had their questions answered and he is comfortable with the plan for discharge.    Diagnosis:    ICD-10-CM    1. Umbilical hernia without obstruction and without gangrene  K42.9 Case Request: HERNIORRHAPHY, UMBILICAL, OPEN     Case " Request: HERNIORRHAPHY, UMBILICAL, OPEN   2. Periumbilical hernia  K42.9        Discharge Medications:  New Prescriptions    OXYCODONE (ROXICODONE) 5 MG TABLET    Take 1 tablet (5 mg) by mouth every 6 hours as needed for pain       Scribe Disclosure:  I, Doris Bullard, am serving as a scribe at 12:59 PM on 4/6/2022 to document services personally performed by Trierweiler, Chad A, MD based on my observations and the provider's statements to me.            Trierweiler, Chad A, MD  04/08/22 1153

## 2022-04-06 NOTE — TELEPHONE ENCOUNTER
Type of surgery: Open umbilical hernia repair  Location of surgery: [facility]  Date and time of surgery: 4/13/22 at 12pm  Surgeon: Dr. Hector Higgins  Pre-Op Appt Date: Patient to schedule  Post-Op Appt Date: Patient to schedule   Packet sent out: Yes  Pre-cert/Authorization completed:  Not Applicable  Date: 4/6/22

## 2022-04-11 ENCOUNTER — OFFICE VISIT (OUTPATIENT)
Dept: INTERNAL MEDICINE | Facility: CLINIC | Age: 57
End: 2022-04-11
Payer: OTHER MISCELLANEOUS

## 2022-04-11 VITALS
BODY MASS INDEX: 31.71 KG/M2 | RESPIRATION RATE: 16 BRPM | OXYGEN SATURATION: 99 % | SYSTOLIC BLOOD PRESSURE: 125 MMHG | WEIGHT: 247 LBS | TEMPERATURE: 98.5 F | DIASTOLIC BLOOD PRESSURE: 75 MMHG | HEART RATE: 62 BPM

## 2022-04-11 DIAGNOSIS — E78.5 HYPERLIPIDEMIA LDL GOAL <160: ICD-10-CM

## 2022-04-11 DIAGNOSIS — E11.9 TYPE 2 DIABETES MELLITUS WITHOUT COMPLICATION, WITHOUT LONG-TERM CURRENT USE OF INSULIN (H): ICD-10-CM

## 2022-04-11 DIAGNOSIS — Z01.818 PREOP GENERAL PHYSICAL EXAM: Primary | ICD-10-CM

## 2022-04-11 DIAGNOSIS — K42.9 UMBILICAL HERNIA WITHOUT OBSTRUCTION AND WITHOUT GANGRENE: ICD-10-CM

## 2022-04-11 DIAGNOSIS — Z11.59 ENCOUNTER FOR SCREENING FOR OTHER VIRAL DISEASES: ICD-10-CM

## 2022-04-11 PROCEDURE — U0005 INFEC AGEN DETEC AMPLI PROBE: HCPCS | Performed by: PHYSICIAN ASSISTANT

## 2022-04-11 PROCEDURE — U0003 INFECTIOUS AGENT DETECTION BY NUCLEIC ACID (DNA OR RNA); SEVERE ACUTE RESPIRATORY SYNDROME CORONAVIRUS 2 (SARS-COV-2) (CORONAVIRUS DISEASE [COVID-19]), AMPLIFIED PROBE TECHNIQUE, MAKING USE OF HIGH THROUGHPUT TECHNOLOGIES AS DESCRIBED BY CMS-2020-01-R: HCPCS | Performed by: PHYSICIAN ASSISTANT

## 2022-04-11 PROCEDURE — 93000 ELECTROCARDIOGRAM COMPLETE: CPT | Performed by: PHYSICIAN ASSISTANT

## 2022-04-11 PROCEDURE — 99214 OFFICE O/P EST MOD 30 MIN: CPT | Performed by: PHYSICIAN ASSISTANT

## 2022-04-11 NOTE — PATIENT INSTRUCTIONS
Do not take metformin the morning of surgery   Other morning medications with a sip of water  Hold all supplements/vitamins one week prior   Stop Ibuprofen today     Preparing for Your Surgery  Getting started  A nurse will call you to review your health history and instructions. They will give you an arrival time based on your scheduled surgery time. Please be ready to share:  Your doctor's clinic name and phone number  Your medical, surgical and anesthesia history  A list of allergies and sensitivities  A list of medicines, including herbal treatments and over-the-counter drugs  Whether the patient has a legal guardian (ask how to send us the papers in advance)  Please tell us if you're pregnant--or if there's any chance you might be pregnant. Some surgeries may injure a fetus (unborn baby), so they require a pregnancy test. Surgeries that are safe for a fetus don't always need a test, and you can choose whether to have one.   If you have a child who's having surgery, please ask for a copy of Preparing for Your Child's Surgery.    Preparing for surgery  Within 30 days of surgery: Have a pre-op exam (sometimes called an H&P, or History and Physical). This can be done at a clinic or pre-operative center.  If you're having a , you may not need this exam. Talk to your care team.  At your pre-op exam, talk to your care team about all medicines you take. If you need to stop any medicines before surgery, ask when to start taking them again.  We do this for your safety. Many medicines can make you bleed too much during surgery. Some change how well surgery (anesthesia) drugs work.  Call your insurance company to let them know you're having surgery. (If you don't have insurance, call [phone].)  Call your clinic if there's any change in your health. This includes signs of a cold or flu (sore throat, runny nose, cough, rash, fever). It also includes a scrape or scratch near the surgery site.  If you have  questions on the day of surgery, call your hospital or surgery center.  COVID testing  You may need to be tested for COVID-19 before having surgery. If so, your surgical team will give you instructions for scheduling this test, separate from your preoperative history and physical.  Eating and drinking guidelines  For your safety: Unless your surgeon tells you otherwise, follow the guidelines below.  Eat and drink as usual until 8 hours before surgery. After that, no food or milk.  Drink clear liquids until 2 hours before surgery. These are liquids you can see through, like water, Gatorade and Propel Water. You may also have black coffee and tea (no cream or milk).  Nothing by mouth within 2 hours of surgery. This includes gum, candy and breath mints.  If you drink alcohol: Stop drinking it the night before surgery.  If your care team tells you to take medicine on the morning of surgery, it's okay to take it with a sip of water.  Preventing infection  Shower or bathe the night before and morning of your surgery. Follow the instructions your clinic gave you. (If no instructions, use regular soap.)  Don't shave or clip hair near your surgery site. We'll remove the hair if needed.  Don't smoke or vape the morning of surgery. You may chew nicotine gum up to 2 hours before surgery. A nicotine patch is okay.  Note: Some surgeries require you to completely quit smoking and nicotine. Check with your surgeon.  Your care team will make every effort to keep you safe from infection. We will:  Clean our hands often with soap and water (or an alcohol-based hand rub).  Clean the skin at your surgery site with a special soap that kills germs.  Give you a special gown to keep you warm. (Cold raises the risk of infection.)  Wear special hair covers, masks, gowns and gloves during surgery.  Give antibiotic medicine, if prescribed. Not all surgeries need antibiotics.  What to bring on the day of surgery  Photo ID and insurance  card  Copy of your health care directive, if you have one  Glasses and hearing aides (bring cases)  You can't wear contacts during surgery  Inhaler and eye drops, if you use them (tell us about these when you arrive)  CPAP machine or breathing device, if you use them  A few personal items, if spending the night  If you have . . .  A pacemaker, ICD (cardiac defibrillator) or other implant: Bring the ID card.  An implanted stimulator: Bring the remote control.  A legal guardian: Bring a copy of the certified (court-stamped) guardianship papers.  Please remove any jewelry, including body piercings. Leave jewelry and other valuables at home.  If you're going home the day of surgery  You must have a responsible adult drive you home. They should stay with you overnight as well.  If you don't have someone to stay with you, and you aren't safe to go home alone, we may keep you overnight. Insurance often won't pay for this.  After surgery  If it's hard to control your pain or you need more pain medicine, please call your surgeon's office.  Questions?   If you have any questions for your care team, list them here: _________________________________________________________________________________________________________________________________________________________________________ ____________________________________ ____________________________________ ____________________________________  For informational purposes only. Not to replace the advice of your health care provider. Copyright   2003, 2019 [facility]. All rights reserved. Clinically reviewed by Esme De La Garza MD. [employer] [id number] - REV 07/21.

## 2022-04-11 NOTE — H&P (VIEW-ONLY)
[address] [address]  Phone: [phone]  Primary Provider: Ibrahima Campbell  Pre-op Performing Provider: GIANA GANDHI      PREOPERATIVE EVALUATION:  Today's date: 4/11/2022    Ricki Blanchard is a 56 year old male who presents for a preoperative evaluation.    Surgical Information:  Surgery/Procedure: OPEN UMBILICAL HERNIA REPAIR  Surgery Location: [facility]  Surgeon: Hector Higgins MD  Surgery Date: 04/13/2022  Time of Surgery: 12:05 pm  Where patient plans to recover: At home with family  Fax number for surgical facility: Note does not need to be faxed, will be available electronically in Epic.    Type of Anesthesia Anticipated: General    Assessment & Plan     The proposed surgical procedure is considered INTERMEDIATE risk.    Preop general physical exam    - EKG 12-lead complete w/read - Clinics    Umbilical hernia without obstruction and without gangrene    - EKG 12-lead complete w/read - Clinics    Type 2 diabetes mellitus without complication, without long-term current use of insulin (H)    - EKG 12-lead complete w/read - Clinics    Hyperlipidemia LDL goal <160    - EKG 12-lead complete w/read - Clinics    Encounter for screening for other viral diseases    - Asymptomatic COVID-19 Virus (Coronavirus) by PCR         Risks and Recommendations:  The patient has the following additional risks and recommendations for perioperative complications:  Diabetes:  - Patient is not on insulin therapy: regular NPO guidelines can be followed.     Medication Instructions:  Patient is to take all scheduled medications on the day of surgery EXCEPT for modifications listed below:   - Statins: Continue taking on the day of surgery.    - metformin: HOLD day of surgery.   - ibuprofen (Advil, Motrin): HOLD 1 day before surgery.     RECOMMENDATION:  APPROVAL GIVEN to proceed with proposed procedure, without further diagnostic  evaluation.                      Subjective     HPI related to upcoming procedure: umbilical hernia - acute onset     Preop Questions 4/8/2022   1. Have you ever had a heart attack or stroke? No   2. Have you ever had surgery on your heart or blood vessels, such as a stent placement, a coronary artery bypass, or surgery on an artery in your head, neck, heart, or legs? No   3. Do you have chest pain with activity? No   4. Do you have a history of  heart failure? No   5. Do you currently have a cold, bronchitis or symptoms of other infection? No   6. Do you have a cough, shortness of breath, or wheezing? No   7. Do you or anyone in your family have previous history of blood clots? No   8. Do you or does anyone in your family have a serious bleeding problem such as prolonged bleeding following surgeries or cuts? No   9. Have you ever had problems with anemia or been told to take iron pills? No   10. Have you had any abnormal blood loss such as black, tarry or bloody stools? No   11. Have you ever had a blood transfusion? No   12. Are you willing to have a blood transfusion if it is medically needed before, during, or after your surgery? Yes   13. Have you or any of your relatives ever had problems with anesthesia? No   14. Do you have sleep apnea, excessive snoring or daytime drowsiness? No   15. Do you have any artifical heart valves or other implanted medical devices like a pacemaker, defibrillator, or continuous glucose monitor? No   16. Do you have artificial joints? No   17. Are you allergic to latex? No       Health Care Directive:  Patient does not have a Health Care Directive or Living Will:     Preoperative Review of :   reviewed - controlled substances reflected in medication list.      Status of Chronic Conditions:  See problem list for active medical problems.  Problems all longstanding and stable, except as noted/documented.  See ROS for pertinent symptoms related to these conditions.    DIABETES -  Patient has a longstanding history of DiabetesType Type II . Patient is being treated with diet and oral agents and denies significant side effects. Control has been good. Complicating factors include but are not limited to: hyperlipidemia.     HYPERLIPIDEMIA - Patient has a long history of significant Hyperlipidemia requiring medication for treatment with recent good control. Patient reports no problems or side effects with the medication.       Review of Systems  CONSTITUTIONAL: NEGATIVE for fever, chills, change in weight  ENT/MOUTH: NEGATIVE for ear, mouth and throat problems  RESP: NEGATIVE for significant cough or SOB  CV: NEGATIVE for chest pain, palpitations or peripheral edema  GI: NEGATIVE for nausea, abdominal pain, heartburn, or change in bowel habits  MUSCULOSKELETAL: NEGATIVE for significant arthralgias or myalgia  NEURO: NEGATIVE for weakness, dizziness or paresthesias  ENDOCRINE: NEGATIVE for temperature intolerance, skin/hair changes  PSYCHIATRIC: NEGATIVE for changes in mood or affect  ROS otherwise negative    Patient Active Problem List    Diagnosis Date Noted     Type 2 diabetes mellitus without complication, without long-term current use of insulin (H) 02/26/2020     Priority: Medium     A1C 6.8       Syncope      Priority: Medium     Prediabetes      Priority: Medium     A1C 6.5       Obesity (BMI 30-39.9) 04/28/2014     Priority: Medium     BMI 33.4       Other postprocedural status(V45.89) 12/29/2011     Priority: Medium     Neck pain 10/13/2011     Priority: Medium     Cervical radiculopathy 10/13/2011     Priority: Medium     Hyperlipidemia LDL goal <160 09/01/2011     Priority: Medium            Migraine headache 10/25/2010     Priority: Medium     (Problem list name updated by automated process. Provider to review and confirm.)       Tobacco abuse 10/25/2010     Priority: Medium      Past Medical History:   Diagnosis Date     Chronic pain     disc problems     Headache(784.0)      like migraines     Hyperlipidemia LDL goal <160 9/1/2011         Migraine headaches 10/25/2010     Neck pain      NSVT (nonsustained ventricular tachycardia) (H)      Obesity (BMI 30-39.9) 4/28/14    BMI 33.4     Prediabetes     A1C 6.5     Syncope      Tobacco abuse 10/25/2010     Type 2 diabetes mellitus without complication, without long-term current use of insulin (H) 02/26/2020    A1C 6.8     Past Surgical History:   Procedure Laterality Date     ARTHROSCOPY KNEE Right 10/14/2014    meniscus repair right knee @ [facility]     EP ABLATION / EP STUDIES  09/25/2017    Modification of the slow pathway of the atrioventricular node to prevent presumptive typical AVNRT     MR CARDIAC STRESS WO CONTRAST  08/28/2017    negative cardiac MRI     REPLACE DISK CERVICAL ANTERIOR  12/13/2011    Procedure:REPLACE DISK CERVICAL ANTERIOR; C5-6 ARTHROPLASTY (PRO DISC-C) (c-arm); Surgeon:ZINA JOSHI; Location:[facility] OR     Current Outpatient Medications   Medication Sig Dispense Refill     Continuous Blood Gluc Sensor ([employer] CRISTIANA 14 DAY SENSOR) MISC 1 each every 14 days Change every 14 days. 2 each 5     ibuprofen (ADVIL) 200 MG capsule Take 600 mg by mouth every 4 hours as needed for fever        metFORMIN (GLUCOPHAGE-XR) 500 MG 24 hr tablet Take 1 tablet (500 mg) by mouth daily 90 tablet 1     rosuvastatin (CRESTOR) 10 MG tablet TAKE 1 TABLET BY MOUTH EVERY DAY 90 tablet 3     sildenafil (VIAGRA) 100 MG tablet Take 0.5-1 tablets ( mg) by mouth daily as needed (erectile dysfunction) 30 tablet 5       Allergies   Allergen Reactions     Seasonal Allergies Other (See Comments)     Sneezing, watery eyes        Social History     Tobacco Use     Smoking status: Current Every Day Smoker     Packs/day: 0.50     Years: 20.00     Pack years: 10.00     Types: Cigarettes     Last attempt to quit: 12/12/2011     Years since quitting: 10.3     Smokeless tobacco: Never Used     Tobacco comment: 1/2-1 pk daily   Substance  Use Topics     Alcohol use: No     Alcohol/week: 0.0 standard drinks       History   Drug Use No         Objective     There were no vitals taken for this visit.    Physical Exam  GENERAL APPEARANCE: healthy, alert and no distress  EYES: Eyes grossly normal to inspection  HENT: ear canals and TM's normal and nose and mouth without ulcers or lesions  RESP: lungs clear to auscultation - no rales, rhonchi or wheezes  CV: regular rates and rhythm and normal S1 S2, no S3 or S4  MS: extremities normal- no gross deformities noted  NEURO: Normal strength and tone, sensory exam grossly normal, mentation intact and speech normal  PSYCH: mentation appears normal and affect normal/bright    Recent Labs   Lab Test 04/06/22  1117 10/27/21  0840 05/27/21  1435 02/23/21  1056   HGB 16.0  --   --  16.8     --   --  267    133 134 133   POTASSIUM 3.9 4.2 3.8 4.1   CR 1.20 1.13 1.17 1.11   A1C  --  5.7* 6.3* 7.7*        Diagnostics:  No labs were ordered during this visit.   EKG: sinus bradycardia, 1st degree AV block , normal axis, normal intervals, no acute ST/T changes c/w ischemia, no LVH by voltage criteria, unchanged from previous tracings    Revised Cardiac Risk Index (RCRI):  The patient has the following serious cardiovascular risks for perioperative complications:   - No serious cardiac risks = 0 points     RCRI Interpretation: 0 points: Class I (very low risk - 0.4% complication rate)           Signed Electronically by: Mariajose Lu PA-C  Copy of this evaluation report is provided to requesting physician.

## 2022-04-11 NOTE — PROGRESS NOTES
[address] [address]  Phone: [phone]  Primary Provider: Ibrahima Campbell  Pre-op Performing Provider: GIANA GANDHI      PREOPERATIVE EVALUATION:  Today's date: 4/11/2022    Ricki Blanchard is a 56 year old male who presents for a preoperative evaluation.    Surgical Information:  Surgery/Procedure: OPEN UMBILICAL HERNIA REPAIR  Surgery Location: [facility]  Surgeon: Hector Higgins MD  Surgery Date: 04/13/2022  Time of Surgery: 12:05 pm  Where patient plans to recover: At home with family  Fax number for surgical facility: Note does not need to be faxed, will be available electronically in Epic.    Type of Anesthesia Anticipated: General    Assessment & Plan     The proposed surgical procedure is considered INTERMEDIATE risk.    Preop general physical exam    - EKG 12-lead complete w/read - Clinics    Umbilical hernia without obstruction and without gangrene    - EKG 12-lead complete w/read - Clinics    Type 2 diabetes mellitus without complication, without long-term current use of insulin (H)    - EKG 12-lead complete w/read - Clinics    Hyperlipidemia LDL goal <160    - EKG 12-lead complete w/read - Clinics    Encounter for screening for other viral diseases    - Asymptomatic COVID-19 Virus (Coronavirus) by PCR         Risks and Recommendations:  The patient has the following additional risks and recommendations for perioperative complications:  Diabetes:  - Patient is not on insulin therapy: regular NPO guidelines can be followed.     Medication Instructions:  Patient is to take all scheduled medications on the day of surgery EXCEPT for modifications listed below:   - Statins: Continue taking on the day of surgery.    - metformin: HOLD day of surgery.   - ibuprofen (Advil, Motrin): HOLD 1 day before surgery.     RECOMMENDATION:  APPROVAL GIVEN to proceed with proposed procedure, without further diagnostic  evaluation.                      Subjective     HPI related to upcoming procedure: umbilical hernia - acute onset     Preop Questions 4/8/2022   1. Have you ever had a heart attack or stroke? No   2. Have you ever had surgery on your heart or blood vessels, such as a stent placement, a coronary artery bypass, or surgery on an artery in your head, neck, heart, or legs? No   3. Do you have chest pain with activity? No   4. Do you have a history of  heart failure? No   5. Do you currently have a cold, bronchitis or symptoms of other infection? No   6. Do you have a cough, shortness of breath, or wheezing? No   7. Do you or anyone in your family have previous history of blood clots? No   8. Do you or does anyone in your family have a serious bleeding problem such as prolonged bleeding following surgeries or cuts? No   9. Have you ever had problems with anemia or been told to take iron pills? No   10. Have you had any abnormal blood loss such as black, tarry or bloody stools? No   11. Have you ever had a blood transfusion? No   12. Are you willing to have a blood transfusion if it is medically needed before, during, or after your surgery? Yes   13. Have you or any of your relatives ever had problems with anesthesia? No   14. Do you have sleep apnea, excessive snoring or daytime drowsiness? No   15. Do you have any artifical heart valves or other implanted medical devices like a pacemaker, defibrillator, or continuous glucose monitor? No   16. Do you have artificial joints? No   17. Are you allergic to latex? No       Health Care Directive:  Patient does not have a Health Care Directive or Living Will:     Preoperative Review of :   reviewed - controlled substances reflected in medication list.      Status of Chronic Conditions:  See problem list for active medical problems.  Problems all longstanding and stable, except as noted/documented.  See ROS for pertinent symptoms related to these conditions.    DIABETES -  Patient has a longstanding history of DiabetesType Type II . Patient is being treated with diet and oral agents and denies significant side effects. Control has been good. Complicating factors include but are not limited to: hyperlipidemia.     HYPERLIPIDEMIA - Patient has a long history of significant Hyperlipidemia requiring medication for treatment with recent good control. Patient reports no problems or side effects with the medication.       Review of Systems  CONSTITUTIONAL: NEGATIVE for fever, chills, change in weight  ENT/MOUTH: NEGATIVE for ear, mouth and throat problems  RESP: NEGATIVE for significant cough or SOB  CV: NEGATIVE for chest pain, palpitations or peripheral edema  GI: NEGATIVE for nausea, abdominal pain, heartburn, or change in bowel habits  MUSCULOSKELETAL: NEGATIVE for significant arthralgias or myalgia  NEURO: NEGATIVE for weakness, dizziness or paresthesias  ENDOCRINE: NEGATIVE for temperature intolerance, skin/hair changes  PSYCHIATRIC: NEGATIVE for changes in mood or affect  ROS otherwise negative    Patient Active Problem List    Diagnosis Date Noted     Type 2 diabetes mellitus without complication, without long-term current use of insulin (H) 02/26/2020     Priority: Medium     A1C 6.8       Syncope      Priority: Medium     Prediabetes      Priority: Medium     A1C 6.5       Obesity (BMI 30-39.9) 04/28/2014     Priority: Medium     BMI 33.4       Other postprocedural status(V45.89) 12/29/2011     Priority: Medium     Neck pain 10/13/2011     Priority: Medium     Cervical radiculopathy 10/13/2011     Priority: Medium     Hyperlipidemia LDL goal <160 09/01/2011     Priority: Medium            Migraine headache 10/25/2010     Priority: Medium     (Problem list name updated by automated process. Provider to review and confirm.)       Tobacco abuse 10/25/2010     Priority: Medium      Past Medical History:   Diagnosis Date     Chronic pain     disc problems     Headache(784.0)      like migraines     Hyperlipidemia LDL goal <160 9/1/2011         Migraine headaches 10/25/2010     Neck pain      NSVT (nonsustained ventricular tachycardia) (H)      Obesity (BMI 30-39.9) 4/28/14    BMI 33.4     Prediabetes     A1C 6.5     Syncope      Tobacco abuse 10/25/2010     Type 2 diabetes mellitus without complication, without long-term current use of insulin (H) 02/26/2020    A1C 6.8     Past Surgical History:   Procedure Laterality Date     ARTHROSCOPY KNEE Right 10/14/2014    meniscus repair right knee @ [facility]     EP ABLATION / EP STUDIES  09/25/2017    Modification of the slow pathway of the atrioventricular node to prevent presumptive typical AVNRT     MR CARDIAC STRESS WO CONTRAST  08/28/2017    negative cardiac MRI     REPLACE DISK CERVICAL ANTERIOR  12/13/2011    Procedure:REPLACE DISK CERVICAL ANTERIOR; C5-6 ARTHROPLASTY (PRO DISC-C) (c-arm); Surgeon:ZINA JOSHI; Location:[facility] OR     Current Outpatient Medications   Medication Sig Dispense Refill     Continuous Blood Gluc Sensor (GiveoSTYLE CRISTIANA 14 DAY SENSOR) MISC 1 each every 14 days Change every 14 days. 2 each 5     ibuprofen (ADVIL) 200 MG capsule Take 600 mg by mouth every 4 hours as needed for fever        metFORMIN (GLUCOPHAGE-XR) 500 MG 24 hr tablet Take 1 tablet (500 mg) by mouth daily 90 tablet 1     rosuvastatin (CRESTOR) 10 MG tablet TAKE 1 TABLET BY MOUTH EVERY DAY 90 tablet 3     sildenafil (VIAGRA) 100 MG tablet Take 0.5-1 tablets ( mg) by mouth daily as needed (erectile dysfunction) 30 tablet 5       Allergies   Allergen Reactions     Seasonal Allergies Other (See Comments)     Sneezing, watery eyes        Social History     Tobacco Use     Smoking status: Current Every Day Smoker     Packs/day: 0.50     Years: 20.00     Pack years: 10.00     Types: Cigarettes     Last attempt to quit: 12/12/2011     Years since quitting: 10.3     Smokeless tobacco: Never Used     Tobacco comment: 1/2-1 pk daily   Substance  Use Topics     Alcohol use: No     Alcohol/week: 0.0 standard drinks       History   Drug Use No         Objective     There were no vitals taken for this visit.    Physical Exam  GENERAL APPEARANCE: healthy, alert and no distress  EYES: Eyes grossly normal to inspection  HENT: ear canals and TM's normal and nose and mouth without ulcers or lesions  RESP: lungs clear to auscultation - no rales, rhonchi or wheezes  CV: regular rates and rhythm and normal S1 S2, no S3 or S4  MS: extremities normal- no gross deformities noted  NEURO: Normal strength and tone, sensory exam grossly normal, mentation intact and speech normal  PSYCH: mentation appears normal and affect normal/bright    Recent Labs   Lab Test 04/06/22  1117 10/27/21  0840 05/27/21  1435 02/23/21  1056   HGB 16.0  --   --  16.8     --   --  267    133 134 133   POTASSIUM 3.9 4.2 3.8 4.1   CR 1.20 1.13 1.17 1.11   A1C  --  5.7* 6.3* 7.7*        Diagnostics:  No labs were ordered during this visit.   EKG: sinus bradycardia, 1st degree AV block , normal axis, normal intervals, no acute ST/T changes c/w ischemia, no LVH by voltage criteria, unchanged from previous tracings    Revised Cardiac Risk Index (RCRI):  The patient has the following serious cardiovascular risks for perioperative complications:   - No serious cardiac risks = 0 points     RCRI Interpretation: 0 points: Class I (very low risk - 0.4% complication rate)           Signed Electronically by: Mariajose Lu PA-C  Copy of this evaluation report is provided to requesting physician.

## 2022-04-12 LAB — SARS-COV-2 RNA RESP QL NAA+PROBE: NEGATIVE

## 2022-04-13 ENCOUNTER — SURGERY (OUTPATIENT)
Age: 57
End: 2022-04-13
Payer: COMMERCIAL

## 2022-04-13 ENCOUNTER — ANESTHESIA EVENT (OUTPATIENT)
Dept: SURGERY | Facility: CLINIC | Age: 57
End: 2022-04-13
Payer: OTHER MISCELLANEOUS

## 2022-04-13 ENCOUNTER — HOSPITAL ENCOUNTER (OUTPATIENT)
Facility: CLINIC | Age: 57
Discharge: HOME | End: 2022-04-13
Attending: SURGERY | Admitting: SURGERY
Payer: OTHER MISCELLANEOUS

## 2022-04-13 ENCOUNTER — ANESTHESIA (OUTPATIENT)
Dept: SURGERY | Facility: CLINIC | Age: 57
End: 2022-04-13
Payer: OTHER MISCELLANEOUS

## 2022-04-13 VITALS
OXYGEN SATURATION: 97 % | RESPIRATION RATE: 16 BRPM | TEMPERATURE: 97.5 F | BODY MASS INDEX: 31.8 KG/M2 | SYSTOLIC BLOOD PRESSURE: 131 MMHG | HEIGHT: 73 IN | WEIGHT: 239.9 LBS | DIASTOLIC BLOOD PRESSURE: 93 MMHG | HEART RATE: 53 BPM

## 2022-04-13 DIAGNOSIS — G89.18 POSTOPERATIVE PAIN: Primary | ICD-10-CM

## 2022-04-13 DIAGNOSIS — Q64.4: ICD-10-CM

## 2022-04-13 LAB — GLUCOSE BLDC GLUCOMTR-MCNC: 101 MG/DL (ref 70–99)

## 2022-04-13 PROCEDURE — 250N000011 HC RX IP 250 OP 636: Performed by: NURSE ANESTHETIST, CERTIFIED REGISTERED

## 2022-04-13 PROCEDURE — 272N000001 HC OR GENERAL SUPPLY STERILE: Performed by: SURGERY

## 2022-04-13 PROCEDURE — 250N000013 HC RX MED GY IP 250 OP 250 PS 637: Performed by: PHYSICIAN ASSISTANT

## 2022-04-13 PROCEDURE — 999N000141 HC STATISTIC PRE-PROCEDURE NURSING ASSESSMENT: Performed by: SURGERY

## 2022-04-13 PROCEDURE — 250N000011 HC RX IP 250 OP 636: Performed by: SURGERY

## 2022-04-13 PROCEDURE — 10060 I&D ABSCESS SIMPLE/SINGLE: CPT | Performed by: SURGERY

## 2022-04-13 PROCEDURE — 49585 PR REPAIR UMBILICAL HERN,5+Y/O,REDUC: CPT | Performed by: SURGERY

## 2022-04-13 PROCEDURE — 250N000009 HC RX 250: Performed by: NURSE ANESTHETIST, CERTIFIED REGISTERED

## 2022-04-13 PROCEDURE — 82962 GLUCOSE BLOOD TEST: CPT

## 2022-04-13 PROCEDURE — 258N000003 HC RX IP 258 OP 636: Performed by: NURSE ANESTHETIST, CERTIFIED REGISTERED

## 2022-04-13 PROCEDURE — 370N000017 HC ANESTHESIA TECHNICAL FEE, PER MIN: Performed by: SURGERY

## 2022-04-13 PROCEDURE — 250N000025 HC SEVOFLURANE, PER MIN: Performed by: SURGERY

## 2022-04-13 PROCEDURE — 250N000011 HC RX IP 250 OP 636: Performed by: ANESTHESIOLOGY

## 2022-04-13 PROCEDURE — 250N000009 HC RX 250: Performed by: SURGERY

## 2022-04-13 PROCEDURE — 710N000012 HC RECOVERY PHASE 2, PER MINUTE: Performed by: SURGERY

## 2022-04-13 PROCEDURE — 710N000009 HC RECOVERY PHASE 1, LEVEL 1, PER MIN: Performed by: SURGERY

## 2022-04-13 PROCEDURE — 49585 PR REPAIR UMBILICAL HERN,5+Y/O,REDUC: CPT | Mod: AS | Performed by: PHYSICIAN ASSISTANT

## 2022-04-13 PROCEDURE — 360N000075 HC SURGERY LEVEL 2, PER MIN: Performed by: SURGERY

## 2022-04-13 RX ORDER — PROPOFOL 10 MG/ML
INJECTION, EMULSION INTRAVENOUS PRN
Status: DISCONTINUED | OUTPATIENT
Start: 2022-04-13 | End: 2022-04-13

## 2022-04-13 RX ORDER — HYDROMORPHONE HCL IN WATER/PF 6 MG/30 ML
0.2 PATIENT CONTROLLED ANALGESIA SYRINGE INTRAVENOUS EVERY 5 MIN PRN
Status: DISCONTINUED | OUTPATIENT
Start: 2022-04-13 | End: 2022-04-13 | Stop reason: HOSPADM

## 2022-04-13 RX ORDER — BUPIVACAINE HYDROCHLORIDE AND EPINEPHRINE 5; 5 MG/ML; UG/ML
INJECTION, SOLUTION EPIDURAL; INTRACAUDAL; PERINEURAL
Status: DISCONTINUED
Start: 2022-04-13 | End: 2022-04-13 | Stop reason: HOSPADM

## 2022-04-13 RX ORDER — CEFAZOLIN SODIUM/WATER 2 G/20 ML
2 SYRINGE (ML) INTRAVENOUS SEE ADMIN INSTRUCTIONS
Status: DISCONTINUED | OUTPATIENT
Start: 2022-04-13 | End: 2022-04-13 | Stop reason: HOSPADM

## 2022-04-13 RX ORDER — ONDANSETRON 2 MG/ML
INJECTION INTRAMUSCULAR; INTRAVENOUS PRN
Status: DISCONTINUED | OUTPATIENT
Start: 2022-04-13 | End: 2022-04-13

## 2022-04-13 RX ORDER — MEPERIDINE HYDROCHLORIDE 25 MG/ML
12.5 INJECTION INTRAMUSCULAR; INTRAVENOUS; SUBCUTANEOUS
Status: DISCONTINUED | OUTPATIENT
Start: 2022-04-13 | End: 2022-04-13 | Stop reason: HOSPADM

## 2022-04-13 RX ORDER — PROPOFOL 10 MG/ML
INJECTION, EMULSION INTRAVENOUS CONTINUOUS PRN
Status: DISCONTINUED | OUTPATIENT
Start: 2022-04-13 | End: 2022-04-13

## 2022-04-13 RX ORDER — BUPIVACAINE HYDROCHLORIDE AND EPINEPHRINE 5; 5 MG/ML; UG/ML
INJECTION, SOLUTION EPIDURAL; INTRACAUDAL; PERINEURAL PRN
Status: DISCONTINUED | OUTPATIENT
Start: 2022-04-13 | End: 2022-04-13 | Stop reason: HOSPADM

## 2022-04-13 RX ORDER — ONDANSETRON 4 MG/1
4 TABLET, ORALLY DISINTEGRATING ORAL EVERY 30 MIN PRN
Status: DISCONTINUED | OUTPATIENT
Start: 2022-04-13 | End: 2022-04-13 | Stop reason: HOSPADM

## 2022-04-13 RX ORDER — AMOXICILLIN 250 MG
1-2 CAPSULE ORAL 2 TIMES DAILY
Qty: 30 TABLET | Refills: 0 | Status: SHIPPED | OUTPATIENT
Start: 2022-04-13 | End: 2022-05-26

## 2022-04-13 RX ORDER — MAGNESIUM HYDROXIDE 1200 MG/15ML
LIQUID ORAL PRN
Status: DISCONTINUED | OUTPATIENT
Start: 2022-04-13 | End: 2022-04-13 | Stop reason: HOSPADM

## 2022-04-13 RX ORDER — DEXAMETHASONE SODIUM PHOSPHATE 4 MG/ML
INJECTION, SOLUTION INTRA-ARTICULAR; INTRALESIONAL; INTRAMUSCULAR; INTRAVENOUS; SOFT TISSUE PRN
Status: DISCONTINUED | OUTPATIENT
Start: 2022-04-13 | End: 2022-04-13

## 2022-04-13 RX ORDER — OXYCODONE HYDROCHLORIDE 5 MG/1
5 TABLET ORAL EVERY 4 HOURS PRN
Status: DISCONTINUED | OUTPATIENT
Start: 2022-04-13 | End: 2022-04-13 | Stop reason: HOSPADM

## 2022-04-13 RX ORDER — FENTANYL CITRATE 0.05 MG/ML
25 INJECTION, SOLUTION INTRAMUSCULAR; INTRAVENOUS EVERY 5 MIN PRN
Status: DISCONTINUED | OUTPATIENT
Start: 2022-04-13 | End: 2022-04-13 | Stop reason: HOSPADM

## 2022-04-13 RX ORDER — SODIUM CHLORIDE, SODIUM LACTATE, POTASSIUM CHLORIDE, CALCIUM CHLORIDE 600; 310; 30; 20 MG/100ML; MG/100ML; MG/100ML; MG/100ML
INJECTION, SOLUTION INTRAVENOUS CONTINUOUS PRN
Status: DISCONTINUED | OUTPATIENT
Start: 2022-04-13 | End: 2022-04-13

## 2022-04-13 RX ORDER — FENTANYL CITRATE 50 UG/ML
INJECTION, SOLUTION INTRAMUSCULAR; INTRAVENOUS PRN
Status: DISCONTINUED | OUTPATIENT
Start: 2022-04-13 | End: 2022-04-13

## 2022-04-13 RX ORDER — SODIUM CHLORIDE, SODIUM LACTATE, POTASSIUM CHLORIDE, CALCIUM CHLORIDE 600; 310; 30; 20 MG/100ML; MG/100ML; MG/100ML; MG/100ML
INJECTION, SOLUTION INTRAVENOUS CONTINUOUS
Status: DISCONTINUED | OUTPATIENT
Start: 2022-04-13 | End: 2022-04-13 | Stop reason: HOSPADM

## 2022-04-13 RX ORDER — HYDROCODONE BITARTRATE AND ACETAMINOPHEN 5; 325 MG/1; MG/1
1 TABLET ORAL
Status: COMPLETED | OUTPATIENT
Start: 2022-04-13 | End: 2022-04-13

## 2022-04-13 RX ORDER — NEOSTIGMINE METHYLSULFATE 1 MG/ML
VIAL (ML) INJECTION PRN
Status: DISCONTINUED | OUTPATIENT
Start: 2022-04-13 | End: 2022-04-13

## 2022-04-13 RX ORDER — SULFAMETHOXAZOLE/TRIMETHOPRIM 800-160 MG
1 TABLET ORAL 2 TIMES DAILY
Qty: 14 TABLET | Refills: 0 | Status: SHIPPED | OUTPATIENT
Start: 2022-04-13 | End: 2022-04-20

## 2022-04-13 RX ORDER — ONDANSETRON 2 MG/ML
4 INJECTION INTRAMUSCULAR; INTRAVENOUS EVERY 30 MIN PRN
Status: DISCONTINUED | OUTPATIENT
Start: 2022-04-13 | End: 2022-04-13 | Stop reason: HOSPADM

## 2022-04-13 RX ORDER — OMEGA-3 FATTY ACIDS/FISH OIL 300-1000MG
200 CAPSULE ORAL EVERY 4 HOURS PRN
COMMUNITY
Start: 2022-04-13

## 2022-04-13 RX ORDER — HYDROCODONE BITARTRATE AND ACETAMINOPHEN 5; 325 MG/1; MG/1
1 TABLET ORAL EVERY 4 HOURS PRN
Qty: 12 TABLET | Refills: 0 | Status: SHIPPED | OUTPATIENT
Start: 2022-04-13 | End: 2022-05-26

## 2022-04-13 RX ORDER — CEFAZOLIN SODIUM/WATER 2 G/20 ML
2 SYRINGE (ML) INTRAVENOUS
Status: COMPLETED | OUTPATIENT
Start: 2022-04-13 | End: 2022-04-13

## 2022-04-13 RX ORDER — FENTANYL CITRATE 0.05 MG/ML
25 INJECTION, SOLUTION INTRAMUSCULAR; INTRAVENOUS
Status: DISCONTINUED | OUTPATIENT
Start: 2022-04-13 | End: 2022-04-13 | Stop reason: HOSPADM

## 2022-04-13 RX ORDER — GLYCOPYRROLATE 0.2 MG/ML
INJECTION, SOLUTION INTRAMUSCULAR; INTRAVENOUS PRN
Status: DISCONTINUED | OUTPATIENT
Start: 2022-04-13 | End: 2022-04-13

## 2022-04-13 RX ORDER — LIDOCAINE HYDROCHLORIDE 20 MG/ML
INJECTION, SOLUTION INFILTRATION; PERINEURAL PRN
Status: DISCONTINUED | OUTPATIENT
Start: 2022-04-13 | End: 2022-04-13

## 2022-04-13 RX ADMIN — SODIUM CHLORIDE, POTASSIUM CHLORIDE, SODIUM LACTATE AND CALCIUM CHLORIDE: 600; 310; 30; 20 INJECTION, SOLUTION INTRAVENOUS at 12:18

## 2022-04-13 RX ADMIN — LIDOCAINE HYDROCHLORIDE 80 MG: 20 INJECTION, SOLUTION INFILTRATION; PERINEURAL at 12:20

## 2022-04-13 RX ADMIN — FENTANYL CITRATE 25 MCG: 50 INJECTION, SOLUTION INTRAMUSCULAR; INTRAVENOUS at 13:52

## 2022-04-13 RX ADMIN — FENTANYL CITRATE 25 MCG: 50 INJECTION, SOLUTION INTRAMUSCULAR; INTRAVENOUS at 13:44

## 2022-04-13 RX ADMIN — FENTANYL CITRATE 25 MCG: 50 INJECTION, SOLUTION INTRAMUSCULAR; INTRAVENOUS at 13:26

## 2022-04-13 RX ADMIN — FENTANYL CITRATE 25 MCG: 50 INJECTION, SOLUTION INTRAMUSCULAR; INTRAVENOUS at 13:33

## 2022-04-13 RX ADMIN — DEXAMETHASONE SODIUM PHOSPHATE 4 MG: 4 INJECTION, SOLUTION INTRA-ARTICULAR; INTRALESIONAL; INTRAMUSCULAR; INTRAVENOUS; SOFT TISSUE at 12:32

## 2022-04-13 RX ADMIN — SODIUM CHLORIDE 1000 ML: 900 IRRIGANT IRRIGATION at 12:37

## 2022-04-13 RX ADMIN — HYDROMORPHONE HYDROCHLORIDE 0.2 MG: 0.2 INJECTION, SOLUTION INTRAMUSCULAR; INTRAVENOUS; SUBCUTANEOUS at 14:00

## 2022-04-13 RX ADMIN — PROPOFOL 20 MCG/KG/MIN: 10 INJECTION, EMULSION INTRAVENOUS at 12:20

## 2022-04-13 RX ADMIN — DEXMEDETOMIDINE HYDROCHLORIDE 20 MCG: 100 INJECTION, SOLUTION INTRAVENOUS at 12:35

## 2022-04-13 RX ADMIN — NEOSTIGMINE METHYLSULFATE 5 MG: 1 INJECTION, SOLUTION INTRAVENOUS at 12:51

## 2022-04-13 RX ADMIN — ONDANSETRON 4 MG: 2 INJECTION INTRAMUSCULAR; INTRAVENOUS at 12:35

## 2022-04-13 RX ADMIN — SODIUM CHLORIDE, POTASSIUM CHLORIDE, SODIUM LACTATE AND CALCIUM CHLORIDE: 600; 310; 30; 20 INJECTION, SOLUTION INTRAVENOUS at 13:09

## 2022-04-13 RX ADMIN — FENTANYL CITRATE 100 MCG: 50 INJECTION, SOLUTION INTRAMUSCULAR; INTRAVENOUS at 12:20

## 2022-04-13 RX ADMIN — BUPIVACAINE HYDROCHLORIDE AND EPINEPHRINE 10 ML: 5; 5 INJECTION, SOLUTION EPIDURAL; INTRACAUDAL; PERINEURAL at 12:52

## 2022-04-13 RX ADMIN — Medication 2 G: at 12:18

## 2022-04-13 RX ADMIN — HYDROCODONE BITARTRATE AND ACETAMINOPHEN 1 TABLET: 5; 325 TABLET ORAL at 14:07

## 2022-04-13 RX ADMIN — MIDAZOLAM 2 MG: 1 INJECTION INTRAMUSCULAR; INTRAVENOUS at 12:18

## 2022-04-13 RX ADMIN — GLYCOPYRROLATE 0.7 MG: 0.2 INJECTION, SOLUTION INTRAMUSCULAR; INTRAVENOUS at 12:51

## 2022-04-13 RX ADMIN — ROCURONIUM BROMIDE 50 MG: 50 INJECTION, SOLUTION INTRAVENOUS at 12:22

## 2022-04-13 RX ADMIN — PROPOFOL 200 MG: 10 INJECTION, EMULSION INTRAVENOUS at 12:20

## 2022-04-13 ASSESSMENT — ENCOUNTER SYMPTOMS
SEIZURES: 0
DYSRHYTHMIAS: 1

## 2022-04-13 ASSESSMENT — LIFESTYLE VARIABLES: TOBACCO_USE: 1

## 2022-04-13 NOTE — DISCHARGE INSTRUCTIONS
[facility] - [facility]  Discharge Instructions: Post-Operative Open Umbilical Exploration    ACTIVITY  Take frequent, short walks and increase your activity gradually.    Avoid strenuous physical activity or heavy lifting greater than 15lbs. for 4 weeks.  You may climb stairs.  You may drive without restrictions when you are not using any prescription pain medication and feel comfortable in a car.  You may return to work/school when you are comfortable without any prescription pain medication.    WOUND CARE  Take your antibiotic (Bactrim) for 1 week following surgery. Take a probiotic or eat yogurt as needed to preserve good gut bacteria.  You may remove your bandage and shower 48 hours after the surgery.  Pat your incision dry and leave it open to air.  Re-apply dressing (Band-Aids or gauze/tape) as needed for comfort or drainage.  You have steri-strips (looks like white tape) on your incision.  You may peel off the steri-strips 2 weeks after your surgery if they have not peeled off on their own.   Do not soak your incision in a tub or pool for 2 weeks.   Do not apply any lotions, creams, or ointments to your incision.  A ridge under your incision is normal and will gradually resolve.    DIET  Start with liquids, then gradually resume your regular diet as tolerated.   Drink plenty of fluids to stay hydrated.    PAIN  Expect some tenderness and discomfort at the incision site(s).  Use the prescribed pain medication at your discretion.  Expect gradual resolution of your pain over several days.  You may take ibuprofen with food (unless you have been told not to) or acetaminophen/Tylenol instead of or in addition to your prescribed pain medication.  If you are taking Norco, do not take any additional acetaminophen/Tylenol.  Do not drink alcohol or drive while you are taking pain medications.  You may apply ice to your incisions in 20 minute intervals as needed for the next 48 hours.  After that time,  consider switching to heat if you prefer.    EXPECTATIONS  Pain medications can cause constipation.  Limit use when possible.  Take over the counter stool softener/stimulant, such as Colace or Senna, 1-2 times a day with plenty of water.  You may take a mild over the counter laxative, such as Miralax or a suppository, as needed.  You may take 1 oz. (2 tablespoons) Milk of Magnesia the evening following surgery to encourage bowel movement.  You may discontinue these medications once you are having regular bowel movements and/or are no longer taking your narcotic pain medication.      FOLLOW UP  Our office will contact you in approximately 2 weeks to check on your progress and answer any questions you may have.  If you are doing well, you will not need to return for a follow up appointment.  If any concerns are identified over the phone, we will help you make an appointment to see a provider.   If you have not received a phone call, have any questions or concerns, or would like to be seen, please call us at [phone] and ask to speak with our nurse.  We are located at [address] [place] [address].    CALL OUR OFFICE -[phone] IF YOU HAVE:   Chills or fever above 101 F.  Increased redness, warmth, or drainage at your incisions.  Significant bleeding.  Pain not relieved by your pain medication or rest.  Increasing pain after the first 48 hours.  Any other concerns or questions.      Same Day Surgery Discharge Instructions for  Sedation and General Anesthesia     It's not unusual to feel dizzy, light-headed or faint for up to 24 hours after surgery or while taking pain medication.  If you have these symptoms: sit for a few minutes before standing and have someone assist you when you get up to walk or use the bathroom.    You should rest and relax for the next 24 hours. We recommend you make arrangements to have an adult stay with you for at least 24 hours after your discharge.  Avoid  hazardous and strenuous activity.    DO NOT DRIVE any vehicle or operate mechanical equipment for 24 hours following the end of your surgery.  Even though you may feel normal, your reactions may be affected by the medication you have received.    Do not drink alcoholic beverages for 24 hours following surgery.     Slowly progress to your regular diet as you feel able. It's not unusual to feel nauseated and/or vomit after receiving anesthesia.  If you develop these symptoms, drink clear liquids (apple juice, ginger ale, broth, 7-up, etc. ) until you feel better.  If your nausea and vomiting persists for 24 hours, please notify your surgeon.      All narcotic pain medications, along with inactivity and anesthesia, can cause constipation. Drinking plenty of liquids and increasing fiber intake will help.    For any questions of a medical nature, call your surgeon.    Do not make important decisions for 24 hours.    If you had general anesthesia, you may have a sore throat for a couple of days related to the breathing tube used during surgery.  You may use Cepacol lozenges to help with this discomfort.  If it worsens or if you develop a fever, contact your surgeon.     If you feel your pain is not well managed with the pain medications prescribed by your surgeon, please contact your surgeon's office to let them know so they can address your concerns.       CoVid 19 Information    We want to give you information regarding Covid. Please consult your primary care provider with any questions you might have.     Patient who have symptoms (cough, fever, or shortness of breath), need to isolate for 7 days from when symptoms started OR 72 hours after fever resolves (without fever reducing medications) AND improvement of respiratory symptoms (whichever is longer).    Isolate yourself at home (in own room/own bathroom if possible)  Do Not allow any visitors  Do Not go to work or school  Do Not go to Sikhism,  centers,  shopping, or other public places.  Do Not shake hands.  Avoid close and intimate contact with others (hugging, kissing).  Follow CDC recommendations for household cleaning of frequently touched services.     After the initial 7 days, continue to isolate yourself from household members as much as possible. To continue decrease the risk of community spread and exposure, you and any members of your household should limit activities in public for 14 days after starting home isolation.     You can reference the following CDC link for helpful home isolation/care tips:  https://www.cdc.gov/coronavirus/2019-ncov/downloads/10Things.pdf    Protect Others:  Cover Your Mouth and Nose with a mask, disposable tissue or wash cloth to avoid spreading germs to others.  Wash your hands and face frequently with soap and water    Call Your Primary Doctor If: Breathing difficulty develops or you become worse.    For more information about COVID19 and options for caring for yourself at home, please visit the CDC website at https://www.cdc.gov/coronavirus/2019-ncov/about/steps-when-sick.html  For more options for care at [facility], please visit our website at [email or web address]                  **If you have concerns or questions about your procedure,    please contact Dr. Higgins at  [phone]**

## 2022-04-13 NOTE — ANESTHESIA PREPROCEDURE EVALUATION
Anesthesia Pre-Procedure Evaluation    Patient: Ricki Blanchard   MRN: [id number] : 1965        Procedure : Procedure(s):  OPEN UMBILICAL HERNIA REPAIR          Past Medical History:   Diagnosis Date     Chronic pain     disc problems     Headache(784.0)     like migraines     Hyperlipidemia LDL goal <160 2011         Migraine headaches 10/25/2010     Neck pain      NSVT (nonsustained ventricular tachycardia) (H)      Obesity (BMI 30-39.9) 2014    BMI 33.4     Prediabetes     A1C 6.5     Syncope      Tobacco abuse 10/25/2010     Type 2 diabetes mellitus without complication, without long-term current use of insulin (H) 2020    A1C 6.8      Past Surgical History:   Procedure Laterality Date     ARTHROSCOPY KNEE Right 10/14/2014    meniscus repair right knee @ [facility]     EP ABLATION / EP STUDIES  2017    Modification of the slow pathway of the atrioventricular node to prevent presumptive typical AVNRT     MR CARDIAC STRESS WO CONTRAST  2017    negative cardiac MRI     REPLACE DISK CERVICAL ANTERIOR  2011    Procedure:REPLACE DISK CERVICAL ANTERIOR; C5-6 ARTHROPLASTY (PRO DISC-C) (c-arm); Surgeon:ZINA JOSHI; Location:[facility] OR      Allergies   Allergen Reactions     Seasonal Allergies Other (See Comments)     Sneezing, watery eyes      Social History     Tobacco Use     Smoking status: Current Every Day Smoker     Packs/day: 0.50     Years: 20.00     Pack years: 10.00     Types: Cigarettes     Last attempt to quit: 2011     Years since quitting: 10.3     Smokeless tobacco: Never Used     Tobacco comment: 1/2-1 pk daily   Substance Use Topics     Alcohol use: No     Alcohol/week: 0.0 standard drinks      Wt Readings from Last 1 Encounters:   22 108.8 kg (239 lb 14.4 oz)        Anesthesia Evaluation   Pt has had prior anesthetic.     No history of anesthetic complications       ROS/MED HX  ENT/Pulmonary:     (+) tobacco use,  (-) sleep apnea    Neurologic:    (-) no seizures and no CVA   Cardiovascular:     (+) Dyslipidemia -----fainting (syncope). dysrhythmias (PSVT),     METS/Exercise Tolerance: >4 METS    Hematologic:       Musculoskeletal:       GI/Hepatic:    (-) GERD and liver disease   Renal/Genitourinary:    (-) renal disease   Endo:     (+) type II DM, Obesity,     Psychiatric/Substance Use:       Infectious Disease:       Malignancy:       Other:            Physical Exam    Airway        Mallampati: II   TM distance: > 3 FB   Neck ROM: full   Mouth opening: > 3 cm    Respiratory Devices and Support         Dental  no notable dental history         Cardiovascular   cardiovascular exam normal          Pulmonary   pulmonary exam normal                OUTSIDE LABS:  CBC:   Lab Results   Component Value Date    WBC 11.6 (H) 04/06/2022    WBC 9.6 02/23/2021    HGB 16.0 04/06/2022    HGB 16.8 02/23/2021    HCT 46.8 04/06/2022    HCT 47.2 02/23/2021     04/06/2022     02/23/2021     BMP:   Lab Results   Component Value Date     04/06/2022     10/27/2021    POTASSIUM 3.9 04/06/2022    POTASSIUM 4.2 10/27/2021    CHLORIDE 104 04/06/2022    CHLORIDE 103 10/27/2021    CO2 28 04/06/2022    CO2 22 10/27/2021    BUN 13 04/06/2022    BUN 16 10/27/2021    CR 1.20 04/06/2022    CR 1.13 10/27/2021     (H) 04/13/2022    GLC 82 04/06/2022     COAGS: No results found for: PTT, INR, FIBR  POC:   Lab Results   Component Value Date     (H) 12/14/2011     HEPATIC:   Lab Results   Component Value Date    ALBUMIN 3.8 04/06/2022    PROTTOTAL 7.1 04/06/2022    ALT 42 04/06/2022    AST 31 04/06/2022    ALKPHOS 55 04/06/2022    BILITOTAL 0.6 04/06/2022     OTHER:   Lab Results   Component Value Date    A1C 5.7 (H) 10/27/2021    LUH 9.2 04/06/2022    LIPASE 149 04/06/2022    TSH 1.21 10/26/2018    SED 5 10/25/2010       Anesthesia Plan    ASA Status:  2   NPO Status:  NPO Appropriate    Anesthesia Type: General.     - Airway: ETT    Induction: Intravenous.   Maintenance: Balanced.        Consents    Anesthesia Plan(s) and associated risks, benefits, and realistic alternatives discussed. Questions answered and patient/representative(s) expressed understanding.    - Discussed:     - Discussed with:  Patient         Postoperative Care    Pain management: Multi-modal analgesia.   PONV prophylaxis: Ondansetron (or other 5HT-3), Dexamethasone or Solumedrol, Background Propofol Infusion     Comments:                Isael Small MD

## 2022-04-13 NOTE — ANESTHESIA PROCEDURE NOTES
Airway       Patient location during procedure: OR       Procedure Start/Stop Times: 4/13/2022 12:24 PM  Staff -        Anesthesiologist:  Isael Small MD       CRNA: Janelle Fuchs APRN CRNA       Performed By: CRNA  Consent for Airway        Urgency: elective  Indications and Patient Condition       Indications for airway management: kaushik-procedural       Induction type:intravenous       Mask difficulty assessment: 2 - vent by mask + OA or adjuvant +/- NMBA    Final Airway Details       Final airway type: endotracheal airway       Successful airway: ETT - single  Endotracheal Airway Details        ETT size (mm): 8.0       Cuffed: yes       Successful intubation technique: direct laryngoscopy       DL Blade Type: MAC 3       Grade View of Cords: 1       Adjucts: stylet       Position: Right       Measured from: lips       Secured at (cm): 24    Post intubation assessment        Placement verified by: capnometry, equal breath sounds and chest rise        Number of attempts at approach: 1       Secured with: pink tape       Ease of procedure: easy       Dentition: Intact and Unchanged    Medication(s) Administered   Medication Administration Time: 4/13/2022 12:24 PM

## 2022-04-13 NOTE — OP NOTE
General Surgery Operative Note    PREOPERATIVE DIAGNOSIS:  Umbilical hernia without obstruction and without gangrene [K42.9]    POSTOPERATIVE DIAGNOSIS: Umbilical abscess    PROCEDURE:    UMBILICAL EXPLORATION    ANESTHESIA:  General.    PREOPERATIVE MEDICATIONS: Ancef IV    SURGEON:  Hector Higgins M.D.    ASSISTANT:  Marleen Yañez PA-C, Physician assistant first assistant was necessary during this procedure due to expertise in patient positioning, prepping, incisional opening, retraction, exposure, and suctioning.    INDICATIONS: Patient is a 56-year-old gentleman whom I saw in the emergency department last week.  He had a nonreducible umbilical mass that felt consistent with an incarcerated umbilical hernia.  He had no GI symptoms.  He was sent home and was scheduled for hernia repair.  He had been started on antibiotics due to some irritation/inflammation and erythema around the umbilicus itself.  Patient reports in the last 24 hours he had developed some purulent drainage from the umbilicus.  We had discussed his management options as it pertains to hernia repair.  Risks, benefits alternatives were reviewed.  His questions were answered and he consented to proceed.    DESCRIPTION OF PROCEDURE: The patient was taken to the operating suite and placed under general anesthetic. The abdomen was prepped and draped in a sterile fashion. Prophylactic antibiotics were administered.  Surgeon initiated timeout was acknowledged. We made a curvilinear incision just below the umbilicus.  This incision was carried down through the subcutaneous tissues.  As we dissected down to the subcutaneous tissues and mobilized the umbilical skin we encountered a pocket containing hair and a small amount of fluid.  There was not any real significant purulence within this.  There was however significant induration of the tissues in this area extending all the way down to the abdominal wall fascia.  We surgically debrided this pocket  out and removed any inflamed tissue.  This debridement was carried down to the level of the fascia.  I debrided all   the inflamed tissue from the fascia,  there was a small fascial hole that measured approximately 2 mm in diameter.  This fascial hole was closed with a figure-of-eight 0 Vicryl suture.  After debriding the inflamed tissue the umbilical skin was sutured back to the fascia with a 3-0 Vicryl suture.  Local anesthetic was infiltrated into the surgical area.  The deep subcu was closed with 3-0 Vicryl suture.  Skin incision was closed subicular 4-0 Vicryl suture.  Steri-Strips and dressings were applied.  Patient tolerated this well.  He was awakened in the operating room, taken to the recovery room in stable condition.     Estimated blood loss: 5 cc    Hector Higgins MD

## 2022-04-13 NOTE — ANESTHESIA CARE TRANSFER NOTE
Patient: Ricki Blanchard    Procedure: Procedure(s):  UMBILICAL EXPLORATION       Diagnosis: Umbilical hernia without obstruction and without gangrene [K42.9]  Diagnosis Additional Information: No value filed.    Anesthesia Type:   General     Note:    Oropharynx: oropharynx clear of all foreign objects and spontaneously breathing  Level of Consciousness: awake  Oxygen Supplementation: face mask  Level of Supplemental Oxygen (L/min / FiO2): 8  Independent Airway: airway patency satisfactory and stable  Dentition: dentition unchanged  Vital Signs Stable: post-procedure vital signs reviewed and stable  Report to RN Given: handoff report given  Patient transferred to: PACU    Handoff Report: Identifed the Patient, Identified the Reponsible Provider, Reviewed the pertinent medical history, Discussed the surgical course, Reviewed Intra-OP anesthesia mangement and issues during anesthesia, Set expectations for post-procedure period and Allowed opportunity for questions and acknowledgement of understanding      Vitals:  Vitals Value Taken Time   BP     Temp     Pulse 63 04/13/22 1309   Resp 29 04/13/22 1309   SpO2 98 % 04/13/22 1309   Vitals shown include unvalidated device data.    Electronically Signed By: FRANCIE Beatty CRNA  April 13, 2022  1:10 PM

## 2022-04-13 NOTE — OR NURSING
PT states that his umbilicus has been leaking x 4 days. States has been leaking puss like fluid.  pt states has been on an antibiotic.  Surgeon at bedside.

## 2022-04-13 NOTE — ANESTHESIA POSTPROCEDURE EVALUATION
Patient: Ricki Blanchard    Procedure: Procedure(s):  UMBILICAL EXPLORATION       Anesthesia Type:  General    Note:     Postop Pain Control: Uneventful            Sign Out: Well controlled pain   PONV: No   Neuro/Psych: Uneventful            Sign Out: Acceptable/Baseline neuro status   Airway/Respiratory: Uneventful            Sign Out: Acceptable/Baseline resp. status   CV/Hemodynamics: Uneventful            Sign Out: Acceptable CV status; No obvious hypovolemia; No obvious fluid overload   Other NRE: NONE   DID A NON-ROUTINE EVENT OCCUR? No           Last vitals:  Vitals Value Taken Time   /89 04/13/22 1415   Temp 36.4  C (97.5  F) 04/13/22 1415   Pulse 49 04/13/22 1417   Resp 14 04/13/22 1417   SpO2 98 % 04/13/22 1417   Vitals shown include unvalidated device data.    Electronically Signed By: Isael Small MD  April 13, 2022  3:43 PM

## 2022-04-13 NOTE — LETTER
[facility]  [facility]  [address], [address]  [place] [address]  Phone: [phone]      Date: 4/13    To whom it may concern,    RE: Ricki Blanchard    Patient had surgery on 4/13 and may return to work on with the following restriction: no pushing or heavy lifting > 20 lbs until 5/12.  Patient may return to work with no restrictions on 5/13 unless otherwise specified by our office.    Please contact our office for questions or concerns.      Sincerely,      ANGIE Carlisle Dr.

## 2022-04-13 NOTE — INTERVAL H&P NOTE
"I have reviewed the surgical (or preoperative) H&P that is linked to this encounter, and examined the patient. There are no significant changes    Clinical Conditions Present on Arrival:  Clinically Significant Risk Factors Present on Admission                   # Obesity: Estimated body mass index is 31.65 kg/m  as calculated from the following:    Height as of this encounter: 1.854 m (6' 1\").    Weight as of this encounter: 108.8 kg (239 lb 14.4 oz).       "

## 2022-04-19 DIAGNOSIS — E11.9 TYPE 2 DIABETES MELLITUS WITHOUT COMPLICATION, WITHOUT LONG-TERM CURRENT USE OF INSULIN (H): ICD-10-CM

## 2022-04-21 RX ORDER — METFORMIN HCL 500 MG
TABLET, EXTENDED RELEASE 24 HR ORAL
Qty: 180 TABLET | Refills: 1 | Status: SHIPPED | OUTPATIENT
Start: 2022-04-21 | End: 2022-10-03

## 2022-04-25 ENCOUNTER — OFFICE VISIT (OUTPATIENT)
Dept: SURGERY | Facility: CLINIC | Age: 57
End: 2022-04-25
Payer: COMMERCIAL

## 2022-04-25 DIAGNOSIS — Z09 SURGERY FOLLOW-UP EXAMINATION: Primary | ICD-10-CM

## 2022-04-25 PROCEDURE — 99024 POSTOP FOLLOW-UP VISIT: CPT | Performed by: SURGERY

## 2022-04-28 NOTE — PROGRESS NOTES
Patient returns in follow-up after recent umbilical exploration.  He had been seen in the emergency department for periumbilical pain and lump.  He was felt to have a nonreducible umbilical hernia.  He was ultimately taken to surgery on 13 April.  He was found to have a very small umbilical hernia that was repaired primarily.  He did however have what appeared to be a subcutaneous cyst within the umbilical skin.  This was excised.  He is overall felt well.  No real significant ongoing discomfort or pain.  No fevers or chills.  He has completed his course of antibiotics.    On examination: Incision is clean dry and intact.  There is no evidence of infection or recurrence of hernia.    We discussed advancement of activity.  His questions were answered.  At this time I believe he can follow-up with me on an as-needed basis.

## 2022-05-26 ENCOUNTER — OFFICE VISIT (OUTPATIENT)
Dept: SURGERY | Facility: CLINIC | Age: 57
End: 2022-05-26
Payer: COMMERCIAL

## 2022-05-26 DIAGNOSIS — Z09 SURGERY FOLLOW-UP EXAMINATION: Primary | ICD-10-CM

## 2022-05-26 PROCEDURE — 99024 POSTOP FOLLOW-UP VISIT: CPT | Performed by: SURGERY

## 2022-06-02 NOTE — PROGRESS NOTES
Patient seen in ongoing follow-up after recent umbilical exploration.  Found to have likely infected inclusion cyst that was excised.  Overall he seems to be doing well.  He does not have any ongoing significant complaints.  No fevers or chills.  Reports that bowel function is normal.  Has returned to essentially normal activity.    On examination: Incision is well-healed.  There is no erythema or cellulitis.  No evidence of hernia.    Overall he is doing well.  We discussed advancement of activity.  His questions were answered.  At this point I believe he can follow-up with me on an as needed

## 2022-06-04 ENCOUNTER — HEALTH MAINTENANCE LETTER (OUTPATIENT)
Age: 57
End: 2022-06-04

## 2022-10-09 ENCOUNTER — HEALTH MAINTENANCE LETTER (OUTPATIENT)
Age: 57
End: 2022-10-09

## 2022-10-14 ENCOUNTER — LAB (OUTPATIENT)
Dept: LAB | Facility: CLINIC | Age: 57
End: 2022-10-14
Payer: COMMERCIAL

## 2022-10-14 DIAGNOSIS — E11.9 TYPE 2 DIABETES MELLITUS WITHOUT COMPLICATION, WITHOUT LONG-TERM CURRENT USE OF INSULIN (H): ICD-10-CM

## 2022-10-14 DIAGNOSIS — Z13.220 SCREENING FOR HYPERLIPIDEMIA: ICD-10-CM

## 2022-10-14 LAB — HBA1C MFR BLD: 6.3 % (ref 0–5.6)

## 2022-10-14 PROCEDURE — 80048 BASIC METABOLIC PNL TOTAL CA: CPT

## 2022-10-14 PROCEDURE — 80061 LIPID PANEL: CPT

## 2022-10-14 PROCEDURE — 82043 UR ALBUMIN QUANTITATIVE: CPT

## 2022-10-14 PROCEDURE — 36415 COLL VENOUS BLD VENIPUNCTURE: CPT

## 2022-10-14 PROCEDURE — 83036 HEMOGLOBIN GLYCOSYLATED A1C: CPT

## 2022-10-16 LAB
ANION GAP SERPL CALCULATED.3IONS-SCNC: 8 MMOL/L (ref 3–14)
BUN SERPL-MCNC: 13 MG/DL (ref 7–30)
CALCIUM SERPL-MCNC: 8.9 MG/DL (ref 8.5–10.1)
CHLORIDE BLD-SCNC: 106 MMOL/L (ref 94–109)
CHOLEST SERPL-MCNC: 148 MG/DL
CO2 SERPL-SCNC: 22 MMOL/L (ref 20–32)
CREAT SERPL-MCNC: 1.25 MG/DL (ref 0.66–1.25)
CREAT UR-MCNC: 109 MG/DL
FASTING STATUS PATIENT QL REPORTED: YES
GFR SERPL CREATININE-BSD FRML MDRD: 67 ML/MIN/1.73M2
GLUCOSE BLD-MCNC: 120 MG/DL (ref 70–99)
HDLC SERPL-MCNC: 50 MG/DL
LDLC SERPL CALC-MCNC: 81 MG/DL
MICROALBUMIN UR-MCNC: <5 MG/L
MICROALBUMIN/CREAT UR: NORMAL MG/G{CREAT}
NONHDLC SERPL-MCNC: 98 MG/DL
POTASSIUM BLD-SCNC: 4.5 MMOL/L (ref 3.4–5.3)
SODIUM SERPL-SCNC: 136 MMOL/L (ref 133–144)
TRIGL SERPL-MCNC: 84 MG/DL

## 2022-10-19 DIAGNOSIS — E11.9 TYPE 2 DIABETES MELLITUS WITHOUT COMPLICATION, WITHOUT LONG-TERM CURRENT USE OF INSULIN (H): ICD-10-CM

## 2022-10-24 RX ORDER — METFORMIN HCL 500 MG
1000 TABLET, EXTENDED RELEASE 24 HR ORAL
Qty: 180 TABLET | Refills: 1 | OUTPATIENT
Start: 2022-10-24

## 2022-12-29 ENCOUNTER — MYC REFILL (OUTPATIENT)
Dept: INTERNAL MEDICINE | Facility: CLINIC | Age: 57
End: 2022-12-29

## 2022-12-29 DIAGNOSIS — E11.9 TYPE 2 DIABETES MELLITUS WITHOUT COMPLICATION, WITHOUT LONG-TERM CURRENT USE OF INSULIN (H): ICD-10-CM

## 2022-12-30 RX ORDER — METFORMIN HCL 500 MG
1000 TABLET, EXTENDED RELEASE 24 HR ORAL
Qty: 60 TABLET | Refills: 0 | Status: SHIPPED | OUTPATIENT
Start: 2022-12-30 | End: 2023-01-16

## 2022-12-30 NOTE — TELEPHONE ENCOUNTER
Appointments in Next Year    Jan 16, 2023  9:00 AM  (Arrive by 8:40 AM)  Adult Preventative Visit with Ibrahima Campbell MD  [facility] ([facility] ) [phone]   May 05, 2023  9:15 AM  (Arrive by 9:00 AM)  New Visit with Marcelle Horvath PA-C  [facility] ([facility] ) [phone]      Future appt scheduled   Already received lucia tabor

## 2023-01-09 ASSESSMENT — ENCOUNTER SYMPTOMS
DIARRHEA: 0
CHILLS: 0
NERVOUS/ANXIOUS: 0
ARTHRALGIAS: 0
FREQUENCY: 0
EYE PAIN: 0
DIZZINESS: 0
PARESTHESIAS: 0
HEMATURIA: 0
CONSTIPATION: 0
DYSURIA: 0
HEADACHES: 0
HEARTBURN: 0
ABDOMINAL PAIN: 0
HEMATOCHEZIA: 0
COUGH: 0
NAUSEA: 0
WEAKNESS: 0
JOINT SWELLING: 0
FEVER: 0
SORE THROAT: 0
MYALGIAS: 0
PALPITATIONS: 0
SHORTNESS OF BREATH: 0

## 2023-01-16 ENCOUNTER — OFFICE VISIT (OUTPATIENT)
Dept: INTERNAL MEDICINE | Facility: CLINIC | Age: 58
End: 2023-01-16
Payer: COMMERCIAL

## 2023-01-16 VITALS
WEIGHT: 251.6 LBS | OXYGEN SATURATION: 97 % | SYSTOLIC BLOOD PRESSURE: 124 MMHG | HEIGHT: 73 IN | HEART RATE: 58 BPM | BODY MASS INDEX: 33.34 KG/M2 | TEMPERATURE: 98.1 F | DIASTOLIC BLOOD PRESSURE: 88 MMHG

## 2023-01-16 DIAGNOSIS — Z23 NEED FOR SHINGLES VACCINE: ICD-10-CM

## 2023-01-16 DIAGNOSIS — Z23 NEED FOR PNEUMOCOCCAL VACCINE: ICD-10-CM

## 2023-01-16 DIAGNOSIS — E11.9 TYPE 2 DIABETES MELLITUS WITHOUT COMPLICATION, WITHOUT LONG-TERM CURRENT USE OF INSULIN (H): ICD-10-CM

## 2023-01-16 DIAGNOSIS — Z00.01 ENCOUNTER FOR ROUTINE ADULT MEDICAL EXAM WITH ABNORMAL FINDINGS: Primary | ICD-10-CM

## 2023-01-16 DIAGNOSIS — E78.5 HYPERLIPIDEMIA LDL GOAL <160: ICD-10-CM

## 2023-01-16 PROCEDURE — 99396 PREV VISIT EST AGE 40-64: CPT | Mod: 25 | Performed by: INTERNAL MEDICINE

## 2023-01-16 PROCEDURE — 90472 IMMUNIZATION ADMIN EACH ADD: CPT | Performed by: INTERNAL MEDICINE

## 2023-01-16 PROCEDURE — 99214 OFFICE O/P EST MOD 30 MIN: CPT | Mod: 25 | Performed by: INTERNAL MEDICINE

## 2023-01-16 PROCEDURE — 90677 PCV20 VACCINE IM: CPT | Performed by: INTERNAL MEDICINE

## 2023-01-16 PROCEDURE — 90471 IMMUNIZATION ADMIN: CPT | Performed by: INTERNAL MEDICINE

## 2023-01-16 PROCEDURE — 90750 HZV VACC RECOMBINANT IM: CPT | Performed by: INTERNAL MEDICINE

## 2023-01-16 RX ORDER — METFORMIN HCL 500 MG
1000 TABLET, EXTENDED RELEASE 24 HR ORAL
Qty: 180 TABLET | Refills: 1 | Status: SHIPPED | OUTPATIENT
Start: 2023-01-16 | End: 2023-06-09

## 2023-01-16 RX ORDER — ROSUVASTATIN CALCIUM 10 MG/1
10 TABLET, COATED ORAL DAILY
Qty: 90 TABLET | Refills: 3 | Status: SHIPPED | OUTPATIENT
Start: 2023-01-16 | End: 2024-03-25

## 2023-01-16 ASSESSMENT — ENCOUNTER SYMPTOMS
CHILLS: 0
COUGH: 0
HEADACHES: 0
ARTHRALGIAS: 0
DIZZINESS: 0
HEARTBURN: 0
EYE PAIN: 0
ABDOMINAL PAIN: 0
HEMATOCHEZIA: 0
DIARRHEA: 0
FREQUENCY: 0
PALPITATIONS: 0
MYALGIAS: 0
DYSURIA: 0
SORE THROAT: 0
HEMATURIA: 0
FEVER: 0
NAUSEA: 0
PARESTHESIAS: 0
WEAKNESS: 0
CONSTIPATION: 0
SHORTNESS OF BREATH: 0
JOINT SWELLING: 0
NERVOUS/ANXIOUS: 0

## 2023-01-16 ASSESSMENT — PAIN SCALES - GENERAL: PAINLEVEL: NO PAIN (0)

## 2023-01-16 NOTE — PATIENT INSTRUCTIONS
" Continue all medications at the same doses.  Contact your usual pharmacy if you need refills.        Return to see me in 6 months, sooner if needed.  Please get fasting labs done at the [facility] or any other [facility] lab 1-2 days before this appointment (schedule a \"lab appointment\").  If you get the labs done at another clinic, make arrangements with them directly.  The orders will be in place.  Eat nothing for at least 8 hours prior to having these labs drawn.  Use [employer] or Call [phone] to schedule the appointment with me and lab appointment.       The 10-year ASCVD risk score (Lavelle FINE, et al., 2019) is: 17.1%    Values used to calculate the score:      Age: 57 years      Sex: Male      Is Non- : No      Diabetic: Yes      Tobacco smoker: Yes      Systolic Blood Pressure: 124 mmHg      Is BP treated: No      HDL Cholesterol: 50 mg/dL      Total Cholesterol: 148 mg/dL        Based on your cumulative cardiac disease risk factors producing a much higher than average cardiac risk, take a low dose aspirin every day ( 81 mg) unless you develop problems from taking aspirin, such as easy bruising, bleeding gums, etc.         5 GOALS IN MANAGING DIABETES (i.e. to give the best chance to prevent diabetic complications and vascular disease):     1.  Aggressive diabetic management.  The target for A1C (3 months average blood sugar) is ideally below 6.5, preferably below 7.5    Your diabetes is under good control.      Lab Results   Component Value Date    A1C 6.3 10/14/2022    A1C 5.7 10/27/2021    A1C 6.3 05/27/2021    A1C 7.7 02/23/2021    A1C 6.8 02/26/2020    A1C 6.0 09/14/2018    A1C 6.6 02/01/2016    A1C 6.4 10/06/2014    A1C 6.5 04/01/2014       2.  Aggressive blood pressure control, under 130/80 ideally.  Using medications if needed.    Your blood pressure is under good control    BP Readings from Last 4 Encounters:   01/16/23 124/88   04/13/22 (!) 131/93 " "  04/11/22 125/75   04/06/22 (!) 154/74       3.  Aggressive LDL cholesterol (bad cholesterol) lowering as needed.  Your goal is an LDL under 100 for sure, preferably under 70.     *  All patients with diabetes are recommended to be on a \"statin\" cholesterol lowering medication regardless of the cholesterol levels to give the best chance at avoiding vascular disease.      New guidelines identify four high-risk groups who could benefit from statins:   *people with pre-existing heart disease, such as those who have had a heart attack;   *people ages 40 to 75 who have diabetes of any type  *patients ages 40 to 75 with at least a 7.5% risk of developing cardiovascular disease over the next decade, according to a formula described in the guidelines  *patients with the sort of super-high cholesterol that sometimes runs in families, as evidenced by an LDL of 190 milligrams per deciliter or higher    *  Your cholesterol levels are well controlled.    Recent Labs   Lab Test 10/14/22  0755 10/27/21  0840   CHOL 148 175   HDL 50 49   LDL 81 109*   TRIG 84 84       4.  No smoking    5.  Consider daily preventative Aspirin once per day, every day over age 50 unless there is a specific reason that you cannot take aspirin.       *You should take Aspirin 81 mg once per day, unless you have a reason NOT to take aspirin (i.e. side effect, excessive bruising or bleeding, taking another \"blood tinning\" medication, etc.)       DIABETES REMINDERS:   1. Check your blood glucose regularly either with standard glucometer or personal continuous glucose monitor.    2) Your blood sugar goals:  before eating and  two hours after eating.  If using personal continuous glucose monitor, the goal is Time in the Target range ( ) of 70-75% with very few (under 2%) Below target.    3) Always be prepared to treat low blood sugar symptoms should it happen. Keep a sugar-containing beverage or food nearby.  4) When to call your clinic: "     Blood sugar over 400     If you have 2 to 3 low blood sugars (under 70) in a row    Low readings the same time of day several days in a row  5) When to call 911: If your low blood glucose symptoms do not get better with treatment, or if you/someone else is unable to give you treatment.  6) Work with a Certified Diabetes Educator to assist you with your diabetes management  7) Contact us if you have ANY questions about your medications or instructions, or have problems with getting prescriptions filled.

## 2023-01-16 NOTE — PROGRESS NOTES
SUBJECTIVE:   CC: Lance is an 57 year old who presents for preventative health visit.   Patient has been advised of split billing requirements and indicates understanding: Yes  Healthy Habits:     Getting at least 3 servings of Calcium per day:  NO    Bi-annual eye exam:  Yes    Dental care twice a year:  Yes    Sleep apnea or symptoms of sleep apnea:  None    Diet:  Carbohydrate counting    Frequency of exercise:  6-7 days/week    Duration of exercise:  45-60 minutes    Taking medications regularly:  Yes    Barriers to taking medications:  None    Medication side effects:  None    PHQ-2 Total Score: 0    Additional concerns today:  No    1.  Diabetes:  In regards specifically to the patient's diabetes, they reports no unusual symptoms.  Medication compliance: good  Diabetic diet compliance: good    Patient reports no significant episodes of hypo- or hyperglycemia    Diabetic complications: none     Most  recent labs:    Lab Results   Component Value Date    A1C 6.3 10/14/2022    A1C 5.7 10/27/2021    A1C 6.3 05/27/2021    A1C 7.7 02/23/2021    A1C 6.8 02/26/2020    A1C 6.0 09/14/2018    A1C 6.6 02/01/2016    A1C 6.4 10/06/2014    A1C 6.5 04/01/2014            2.    Hypertension:  History of hypertension, on medication.  No reported side effects from medications.    Reviewed last 6 BP readings in chart:  BP Readings from Last 6 Encounters:   01/16/23 124/88   04/13/22 (!) 131/93   04/11/22 125/75   04/06/22 (!) 154/74   10/27/21 130/80   02/23/21 136/88     No active cardiac complaints or symptoms with exercise.     Today's PHQ-2 Score:   PHQ-2 ( 1999 [employer]) 1/9/2023   Q1: Little interest or pleasure in doing things 0   Q2: Feeling down, depressed or hopeless 0   PHQ-2 Score 0   PHQ-2 Total Score (12-17 Years)- Positive if 3 or more points; Administer PHQ-A if positive -   Q1: Little interest or pleasure in doing things Not at all   Q2: Feeling down, depressed or hopeless Not at all   PHQ-2 Score 0       Social  History     Tobacco Use     Smoking status: Every Day     Packs/day: 0.50     Years: 20.00     Pack years: 10.00     Types: Cigarettes     Last attempt to quit: 2011     Years since quittin.1     Smokeless tobacco: Never     Tobacco comments:     - pk daily   Substance Use Topics     Alcohol use: No     Alcohol/week: 0.0 standard drinks     If you drink alcohol do you typically have >3 drinks per day or >7 drinks per week? Not applicable    Alcohol Use 2023   Prescreen: >3 drinks/day or >7 drinks/week? -   Prescreen: >3 drinks/day or >7 drinks/week? Not Applicable     Last PSA:   PSA   Date Value Ref Range Status   2020 0.76 0 - 4 ug/L Final     Comment:     Assay Method:  Chemiluminescence using Siemens Vista analyzer       Reviewed orders with patient. Reviewed health maintenance and updated orders accordingly - Yes      Reviewed and updated as needed this visit by clinical staff   Tobacco  Allergies  Meds    Surg Hx  Fam Hx          Reviewed and updated as needed this visit by Provider        Surg Hx  Fam Hx           **I reviewed the information recorded in the patient's EPIC chart (including but not limited to medical history, surgical history, family history, problem list, medication list, and allergy list) and updated the information as indicated based on the patients reported information.         Review of Systems   Constitutional: Negative for chills and fever.   HENT: Negative for congestion, ear pain, hearing loss and sore throat.    Eyes: Negative for pain and visual disturbance.   Respiratory: Negative for cough and shortness of breath.    Cardiovascular: Negative for chest pain, palpitations and peripheral edema.   Gastrointestinal: Negative for abdominal pain, constipation, diarrhea, heartburn, hematochezia and nausea.   Genitourinary: Negative for dysuria, frequency, genital sores, hematuria, impotence, penile discharge and urgency.   Musculoskeletal: Negative for  "arthralgias, joint swelling and myalgias.   Skin: Negative for rash.   Neurological: Negative for dizziness, weakness, headaches and paresthesias.   Psychiatric/Behavioral: Negative for mood changes. The patient is not nervous/anxious.      CONSTITUTIONAL: NEGATIVE for fever, chills, change in weight  INTEGUMENTARY/SKIN: NEGATIVE for worrisome rashes, moles or lesions  EYES: NEGATIVE for vision changes or irritation  ENT: NEGATIVE for ear, mouth and throat problems  RESP: NEGATIVE for significant cough or SOB  CV: NEGATIVE for chest pain, palpitations or peripheral edema  GI: NEGATIVE for nausea, abdominal pain, heartburn, or change in bowel habits   male: negative for dysuria, hematuria, decreased urinary stream, erectile dysfunction, urethral discharge  MUSCULOSKELETAL: NEGATIVE for significant arthralgias or myalgia  NEURO: NEGATIVE for weakness, dizziness or paresthesias  PSYCHIATRIC: NEGATIVE for changes in mood or affect    OBJECTIVE:   /88   Pulse 58   Temp 98.1  F (36.7  C) (Oral)   Ht 1.854 m (6' 1\")   Wt 114.1 kg (251 lb 9.6 oz)   SpO2 97%   BMI 33.19 kg/m      Physical Exam    GENERAL alert and no distress  EYES:  Normal sclera,conjunctiva, EOMI  HENT: oral and posterior pharynx without lesions or erythema, facies symmetric  NECK: Neck supple. No LAD, without thyroidmegaly.  RESP: Clear to ausculation bilaterally without wheezes or crackles. Normal BS in all fields.  CV: RRR normal S1S2 without murmurs, rubs or gallops.  LYMPH: no cervical lymph adenopathy appreciated  MS: extremities- no gross deformities of the visible extremities noted,   EXT:  no lower extremity edema  PSYCH: Alert and oriented times 3; speech- coherent  SKIN:  No obvious significant skin lesions on visible portions of face     Diagnostic Test Results:  Labs reviewed in Epic    ASSESSMENT/PLAN:     (Z00.01) Encounter for routine adult medical exam with abnormal findings  (primary encounter diagnosis)  Comment: Discussed " cardiac disease risk factor modification including screening, preventing, and treating hypertension, elevated lipids, diabetes, and smoking cessation.    Discussed age appropriate cancer screening recommendations as dictated by age group and past medical history.    Recommended making better food choices as often as possible, including lower carb, lower fat, lower salt diet and moderation in any alcohol intake.    Recommended maintaining regular physical activity/exercise throughout their lifetime.  Recommended safety and injury prevention (i.e. seatbelt use, safety equipment like helmets when biking, etc).    Reviewed preventive health counseling, as reflected in patient instructions       Colorectal cancer screening       Prostate cancer screening   Plan:     (E11.9) Type 2 diabetes mellitus without complication, without long-term current use of insulin (H)  Comment: This condition is currently controlled on the current medical regimen.  Continue current therapy.   R.ecdm   Plan: metFORMIN (GLUCOPHAGE XR) 500 MG 24 hr tablet,         rosuvastatin (CRESTOR) 10 MG tablet, aspirin         (ASA) 81 MG EC tablet            (E78.5) Hyperlipidemia LDL goal <160  Comment: This condition is currently controlled on the current medical regimen.  Continue current therapy.   Discussed guidelines recommending a statin cholesterol lowering medication for any patient with either diabetes and/or vascular disease, aiming for a LDL goal under 100 for sure, ideally under 70, using whatever dose of statin tolerated.    Plan: rosuvastatin (CRESTOR) 10 MG tablet            (Z23) Need for pneumococcal vaccine  Comment:   Plan: Pneumococcal 20 Valent Conjugate (Prevnar 20)            (Z23) Need for shingles vaccine  Comment:   Plan: ZOSTER VACCINE RECOMBINANT ADJUVANTED         (SHINGRIX)                  Patient has been advised of split billing requirements and indicates understanding: Yes      COUNSELING:   Reviewed preventive health  "counseling, as reflected in patient instructions       Regular exercise       Healthy diet/nutrition       Vision screening       Hearing screening       Immunizations    Vaccinated for: Pneumococcal and Zoster and Declined: Covid-19    Will get tdap at next visit (has had at least 2 tetnus vaccines since 2004)               Aspirin prophylaxis        Colorectal cancer screening up to date with normal colonoscopy 2017       Prostate cancer screening      BMI:   Estimated body mass index is 33.19 kg/m  as calculated from the following:    Height as of this encounter: 1.854 m (6' 1\").    Weight as of this encounter: 114.1 kg (251 lb 9.6 oz).         He reports that he has been smoking cigarettes. He has a 10.00 pack-year smoking history. He has never used smokeless tobacco.  Nicotine/Tobacco Cessation Plan:   Information offered: Patient not interested at this time            Ibrahima Campbell MD  [facility]  "

## 2023-02-17 ENCOUNTER — NURSE TRIAGE (OUTPATIENT)
Dept: NURSING | Facility: CLINIC | Age: 58
End: 2023-02-17
Payer: COMMERCIAL

## 2023-02-17 DIAGNOSIS — U07.1 INFECTION DUE TO 2019 NOVEL CORONAVIRUS: Primary | ICD-10-CM

## 2023-02-17 NOTE — TELEPHONE ENCOUNTER
RN COVID TREATMENT VISIT  02/17/23      The patient has been triaged and does not require a higher level of care.    Ricki Blanchard  57 year old  Current weight? 235    Has the patient been seen by a primary care provider at an [facility] within the past two years? Yes.   Have you been in close proximity to/do you have a known exposure to a person with a confirmed case of influenza? No.     General treatment eligibility:  Date of positive COVID test (PCR or at home)?  2/15    Are you or have you been hospitalized for this COVID-19 infection? No.   Have you received monoclonal antibodies or antiviral treatment for COVID-19 since this positive test? No.   Do you have any of the following conditions that place you at risk of being very sick from COVID-19?   - Age 50 years or older  - Diabetes mellitus, type 1 and type 2  Yes, patient has at least one high risk condition as noted above.     Current COVID symptoms:   - new loss of taste or smell  Yes. Patient has at least one symptom as selected.     How many days since symptoms started? 5 days or less. Established patient, 12 years or older weighing at least 88.2 lbs, who has symptoms that started in the past 5 days, has not been hospitalized nor received treatment already, and is at risk for being very sick from COVID-19.     Treatment eligibility by RN:    Are you currently pregnant or nursing? No    Do you have a clinically significant hypersensitivity to nirmatrelvir or ritonavir, or toxic epidermal necrolysis (TEN) or Quintana-Leandro Syndrome? No    Do you have a history of hepatitis, any hepatic impairment on the Problem List (such as Child-Muñoz Class C, cirrhosis, fatty liver disease, alcoholic liver disease), or was the last liver lab (hepatic panel, ALT, AST, ALK Phos, bilirubin) elevated in the past 6 months? No    Do you have any history of severe renal impairment (eGFR < 30mL/min)? No    Is patient eligible to  continue?   Yes, patient meets all eligibility requirements for the RN COVID treatment (as denoted by all no responses above).     Current Outpatient Medications   Medication Sig Dispense Refill     aspirin (ASA) 81 MG EC tablet Take 1 tablet (81 mg) by mouth daily       ibuprofen (ADVIL/MOTRIN) 200 MG capsule Take 1 capsule (200 mg) by mouth every 4 hours as needed for fever       metFORMIN (GLUCOPHAGE XR) 500 MG 24 hr tablet Take 2 tablets (1,000 mg) by mouth daily (with dinner) 180 tablet 1     rosuvastatin (CRESTOR) 10 MG tablet Take 1 tablet (10 mg) by mouth daily 90 tablet 3     sildenafil (VIAGRA) 100 MG tablet Take 0.5-1 tablets ( mg) by mouth daily as needed (erectile dysfunction) 30 tablet 5       Medications from List 1 of the standing order (on medications that exclude the use of Paxlovid) that patient is taking: NONE. Is patient taking Rover's Wort? No  Is patient taking Keli's Wort or any meds from List 1? No.   Medications from List 2 of the standing order (on meds that provider needs to adjust) that patient is taking: NONE. Is patient on any of the meds from List 2? No.   Medications from List 3 of standing order (on meds that a RN needs to adjust) that patient is taking: rosuvastatin (Crestor): Instructed patient to stop rosuvastatin while taking Paxlovid and restart rosuvastatin 1 day after the completion of Paxlovid.   sildenafil (Viagra, Revatio): Is patient using for erectile dysfunction? Yes, instructed patient to stop taking sildenafil while taking Paxlovid and restart sildenafil 3 days after the completion of Paxlovid. Is patient on any meds from List 3? Yes. Patient is on meds from list 3. No meds require a provider visit and at least one med required RN to adjust.     Paxlovid has an approximate 90% reduction in hospitalization. Paxlovid can possibly cause altered sense of taste, diarrhea (loose, watery stools), high blood pressure, muscle aches.     Would patient like a  Paxlovid prescription?   Yes.   Lab Results   Component Value Date    GFRESTIMATED 67 10/14/2022       Was last eGFR reduced? No, eGFR 60 or greater/ No Result on record. Patient can receive the normal renal function dose. Paxlovid Rx sent to [place] [facility]   [facility]   [phone]  [address] [place] [address]    Hours:  Mon-Fri: 8:30a - 6:00p  Sat-Sun: 8:30a - 12:30p    Curbside Delivery: Patient to call [phone] to set up and  at door 2 of [facility]    Temporary change to home medications: rosuvastatin (Crestor): Instructed patient to stop rosuvastatin while taking Paxlovid and restart rosuvastatin 1 day after the completion of Paxlovid.   sildenafil (Viagra, Revatio): Is patient using for erectile dysfunction? Yes, instructed patient to stop taking sildenafil while taking Paxlovid and restart sildenafil 3 days after the completion of Paxlovid.    All medication adjustments (holds, etc) were discussed with the patient and patient was asked to repeat back (teachback) their med adjustment.  Did patient understand med adjustment? Yes, patient repeated back and understood correctly.        Reviewed the following instructions with the patient:    Paxlovid (nimatrelvir and ritonavir)    How it works  Two medicines (nirmatrelvir and ritonavir) are taken together. They stop the virus from growing. Less amount of virus is easier for your body to fight.    How to take    Medicine comes in a daily container with both medicine tablets. Take by mouth twice daily (once in the morning, once at night) for 5 days.    The number of tablets to take varies by patient.    Don't chew or break capsules. Swallow whole.    When to take  Take as soon as possible after positive COVID-19 test result, and within 5 days of your first symptoms.    Possible side effects  Can cause altered sense of taste, diarrhea (loose, watery stools), high blood pressure, muscle aches.    Kashmir Chinchilla RN

## 2023-02-17 NOTE — TELEPHONE ENCOUNTER
Pt is phoning stating that he tested positive for COVID today 02/17/2023    Pt has been symptomatic since Monday 02/13/2023    Temperature 101.3 - pt has run a fever for the past 3 days - pt states that fever is now gone     Pt is unable to taste or smell     Pt is having mild symptoms at this time and not having any breathing problems at this time       COVID Positive/Requesting COVID treatment    Patient is positive for COVID and requesting treatment options.    Date of positive COVID test (PCR or at home)? 02/17/2023  Current COVID symptoms: cough, fatigue, headache and new loss of taste or smell  Date COVID symptoms began: 02/13/2023    Message should be routed to clinic RN pool. Best phone number to use for call back: [phone]    Per disposition: Discuss With PCP And Callback By Nurse Today    Message forwarded to provider     Care advice given per protocol and when to call back. Pt verbalized understanding and agrees to plan of care.    Michelle Stacy RN  [facility] Nurse Advisor  8:35 AM 2/17/2023              Reason for Disposition    Fever present > 3 days (72 hours)    Additional Information    Negative: SEVERE difficulty breathing (e.g., struggling for each breath, speaks in single words)    Negative: Difficult to awaken or acting confused (e.g., disoriented, slurred speech)    Negative: Bluish (or gray) lips or face now    Negative: Shock suspected (e.g., cold/pale/clammy skin, too weak to stand, low BP, rapid pulse)    Negative: Sounds like a life-threatening emergency to the triager    Negative: [1] Diagnosed or suspected COVID-19 AND [2] symptoms lasting 3 or more weeks    Negative: [1] COVID-19 exposure AND [2] no symptoms    Negative: COVID-19 vaccine reaction suspected (e.g., fever, headache, muscle aches) occurring 1 to 3 days after getting vaccine    Negative: COVID-19 vaccine, questions about    Negative: [1] Lives with someone known to have influenza (flu test positive) AND [2] flu-like  symptoms (e.g., cough, runny nose, sore throat, SOB; with or without fever)    Negative: [1] Adult with possible COVID-19 symptoms AND [2] triager concerned about severity of symptoms or other causes    Negative: COVID-19 and breastfeeding, questions about    Negative: SEVERE or constant chest pain or pressure  (Exception: Mild central chest pain, present only when coughing.)    Negative: MODERATE difficulty breathing (e.g., speaks in phrases, SOB even at rest, pulse 100-120)    Negative: Headache and stiff neck (can't touch chin to chest)    Negative: Oxygen level (e.g., pulse oximetry) 90 percent or lower    Negative: Chest pain or pressure  (Exception: MILD central chest pain, present only when coughing)    Negative: Patient sounds very sick or weak to the triager    Negative: MILD difficulty breathing (e.g., minimal/no SOB at rest, SOB with walking, pulse <100)    Negative: Fever > 103 F (39.4 C)    Negative: [1] Fever > 101 F (38.3 C) AND [2] over 60 years of age    Negative: [1] Fever > 100.0 F (37.8 C) AND [2] bedridden (e.g., nursing home patient, CVA, chronic illness, recovering from surgery)    Negative: [1] HIGH RISK for severe COVID complications (e.g., weak immune system, age > 64 years, obesity with BMI of 30 or higher, pregnant, chronic lung disease or other chronic medical condition) AND [2] COVID symptoms (e.g., cough, fever)  (Exceptions: Already seen by PCP and no new or worsening symptoms.)    Negative: [1] HIGH RISK patient AND [2] influenza is widespread in the community AND [3] ONE OR MORE respiratory symptoms: cough, sore throat, runny or stuffy nose    Negative: [1] HIGH RISK patient AND [2] influenza exposure within the last 7 days AND [3] ONE OR MORE respiratory symptoms: cough, sore throat, runny or stuffy nose    Negative: Oxygen level (e.g., pulse oximetry) 91 to 94 percent    Negative: [1] COVID-19 infection suspected by caller or triager AND [2] mild symptoms (cough, fever, or others)  AND [3] negative COVID-19 rapid test    Protocols used: CORONAVIRUS (COVID-19) DIAGNOSED OR [id number]

## 2023-04-01 ENCOUNTER — TRANSFERRED RECORDS (OUTPATIENT)
Dept: MULTI SPECIALTY CLINIC | Facility: CLINIC | Age: 58
End: 2023-04-01

## 2023-04-01 LAB — RETINOPATHY: NORMAL

## 2023-05-05 ENCOUNTER — OFFICE VISIT (OUTPATIENT)
Dept: DERMATOLOGY | Facility: CLINIC | Age: 58
End: 2023-05-05
Payer: COMMERCIAL

## 2023-05-05 DIAGNOSIS — L82.1 SEBORRHEIC KERATOSIS: ICD-10-CM

## 2023-05-05 DIAGNOSIS — L82.0 INFLAMED SEBORRHEIC KERATOSIS: ICD-10-CM

## 2023-05-05 DIAGNOSIS — D22.9 NEVUS: Primary | ICD-10-CM

## 2023-05-05 DIAGNOSIS — L81.4 LENTIGO: ICD-10-CM

## 2023-05-05 PROCEDURE — 99202 OFFICE O/P NEW SF 15 MIN: CPT | Mod: 25 | Performed by: PHYSICIAN ASSISTANT

## 2023-05-05 PROCEDURE — 17110 DESTRUCTION B9 LES UP TO 14: CPT | Performed by: PHYSICIAN ASSISTANT

## 2023-05-05 NOTE — PROGRESS NOTES
HPI:   Chief complaints: Ricki Blanchard is a pleasant 57 year old male who presents for evaluation of a few spots of concern. He has an irritated spot on the left ear which was cut off 10 years ago but has returned. He has dark spots on the back his wife noticed. He does not have a history of skin cancer.       PHYSICAL EXAM:    There were no vitals taken for this visit.  Skin exam performed as follows: Type 3 skin. Mood appropriate  Alert and Oriented X 3. Well developed, well nourished in no distress.  General appearance: Normal  Head including face: Normal  Eyes: conjunctiva and lids: Normal  Mouth: Lips, teeth, gums: Normal  Neck: Normal  Skin: Scalp and body hair: See below    Brown and tan macules on the arms, face, scalp and back  Keratotic papules on the arms face, scalp and back  Inflamed keratotic papule on the left helix x 1    ASSESSMENT/PLAN:     1. Nevi, lentigos, SKs - advised all benign in appearance no treatment needed  2. Inflamed seborrheic keratosis on the left helix x 1. As physically tender cryosurgery performed. Advised on post op care.   3. Photodamage of skin - discussed recommendations for sun protection, sunscreens.           Follow-up: FSE every 2-3 years/PRN sooner  CC:   Scribed By: Marcelle Horvath, MS, ANGIE

## 2023-05-05 NOTE — LETTER
5/5/2023         RE: Ricki Blanchard  [address]  [facility] [address]        Dear Colleague,    Thank you for referring your patient, Ricki Blanchard, to the [facility]. Please see a copy of my visit note below.    HPI:   Chief complaints: Ricki Blanchard is a pleasant 57 year old male who presents for evaluation of a few spots of concern. He has an irritated spot on the left ear which was cut off 10 years ago but has returned. He has dark spots on the back his wife noticed. He does not have a history of skin cancer.       PHYSICAL EXAM:    There were no vitals taken for this visit.  Skin exam performed as follows: Type 3 skin. Mood appropriate  Alert and Oriented X 3. Well developed, well nourished in no distress.  General appearance: Normal  Head including face: Normal  Eyes: conjunctiva and lids: Normal  Mouth: Lips, teeth, gums: Normal  Neck: Normal  Skin: Scalp and body hair: See below    Brown and tan macules on the arms, face, scalp and back  Keratotic papules on the arms face, scalp and back  Inflamed keratotic papule on the left helix x 1    ASSESSMENT/PLAN:     1. Nevi, lentigos, SKs - advised all benign in appearance no treatment needed  2. Inflamed seborrheic keratosis on the left helix x 1. As physically tender cryosurgery performed. Advised on post op care.   3. Photodamage of skin - discussed recommendations for sun protection, sunscreens.           Follow-up: FSE every 2-3 years/PRN sooner  CC:   Scribed By: Marcelle Horvath, MS, PANORY          Again, thank you for allowing me to participate in the care of your patient.        Sincerely,        Marcelle Horvath PA-C

## 2023-05-27 ENCOUNTER — HEALTH MAINTENANCE LETTER (OUTPATIENT)
Age: 58
End: 2023-05-27

## 2023-06-09 ENCOUNTER — OFFICE VISIT (OUTPATIENT)
Dept: INTERNAL MEDICINE | Facility: CLINIC | Age: 58
End: 2023-06-09
Payer: COMMERCIAL

## 2023-06-09 VITALS
BODY MASS INDEX: 33 KG/M2 | HEIGHT: 73 IN | SYSTOLIC BLOOD PRESSURE: 134 MMHG | HEART RATE: 61 BPM | DIASTOLIC BLOOD PRESSURE: 76 MMHG | WEIGHT: 249 LBS | TEMPERATURE: 97.7 F | RESPIRATION RATE: 16 BRPM

## 2023-06-09 DIAGNOSIS — Z12.5 SCREENING FOR PROSTATE CANCER: ICD-10-CM

## 2023-06-09 DIAGNOSIS — E11.9 TYPE 2 DIABETES MELLITUS WITHOUT COMPLICATION, WITHOUT LONG-TERM CURRENT USE OF INSULIN (H): Primary | ICD-10-CM

## 2023-06-09 DIAGNOSIS — E78.5 HYPERLIPIDEMIA LDL GOAL <100: ICD-10-CM

## 2023-06-09 DIAGNOSIS — E66.9 OBESITY (BMI 30-39.9): ICD-10-CM

## 2023-06-09 DIAGNOSIS — E78.5 HYPERLIPIDEMIA LDL GOAL <160: ICD-10-CM

## 2023-06-09 LAB
ALT SERPL W P-5'-P-CCNC: 26 U/L (ref 10–50)
ANION GAP SERPL CALCULATED.3IONS-SCNC: 11 MMOL/L (ref 7–15)
BUN SERPL-MCNC: 13.7 MG/DL (ref 8–23)
CALCIUM SERPL-MCNC: 9.6 MG/DL (ref 8.6–10)
CHLORIDE SERPL-SCNC: 100 MMOL/L (ref 98–107)
CREAT SERPL-MCNC: 1.2 MG/DL (ref 0.67–1.17)
DEPRECATED HCO3 PLAS-SCNC: 23 MMOL/L (ref 22–29)
GFR SERPL CREATININE-BSD FRML MDRD: 70 ML/MIN/1.73M2
GLUCOSE SERPL-MCNC: 116 MG/DL (ref 70–99)
HBA1C MFR BLD: 6.4 % (ref 0–5.6)
POTASSIUM SERPL-SCNC: 4.7 MMOL/L (ref 3.4–5.3)
SODIUM SERPL-SCNC: 134 MMOL/L (ref 136–145)

## 2023-06-09 PROCEDURE — 36415 COLL VENOUS BLD VENIPUNCTURE: CPT | Performed by: INTERNAL MEDICINE

## 2023-06-09 PROCEDURE — 83036 HEMOGLOBIN GLYCOSYLATED A1C: CPT | Performed by: INTERNAL MEDICINE

## 2023-06-09 PROCEDURE — 84460 ALANINE AMINO (ALT) (SGPT): CPT | Performed by: INTERNAL MEDICINE

## 2023-06-09 PROCEDURE — 99213 OFFICE O/P EST LOW 20 MIN: CPT | Performed by: INTERNAL MEDICINE

## 2023-06-09 PROCEDURE — 80048 BASIC METABOLIC PNL TOTAL CA: CPT | Performed by: INTERNAL MEDICINE

## 2023-06-09 RX ORDER — METFORMIN HCL 500 MG
1000 TABLET, EXTENDED RELEASE 24 HR ORAL
Qty: 180 TABLET | Refills: 2 | Status: SHIPPED | OUTPATIENT
Start: 2023-06-09 | End: 2024-03-25

## 2023-06-09 RX ORDER — BLOOD-GLUCOSE SENSOR
1 EACH MISCELLANEOUS
Qty: 2 EACH | Refills: 11 | Status: SHIPPED | OUTPATIENT
Start: 2023-06-09 | End: 2024-04-19

## 2023-06-09 ASSESSMENT — PAIN SCALES - GENERAL: PAINLEVEL: NO PAIN (0)

## 2023-06-09 NOTE — PROGRESS NOTES
"  Assessment & Plan     (E11.9) Type 2 diabetes mellitus without complication, without long-term current use of insulin (H)  (primary encounter diagnosis)  Comment: This condition is currently controlled on the current medical regimen.  Continue current therapy.   recehck labs. Continue diet.   Reviewed indication for GLP-1 meds, but his insurance will not likely cover them.   Discussed importance of aggressive management of diabetes, including aggressive low cabr diet (one of the most powerful ways to control type II DM), performing regular glucose monitoring, (either with standard glucometer, or preferably personal continuous glucose monitor), medication compliance, regular laboratory monitoring at least every 6 months (or possibly more often if diabetes not at goal), and attending regular follow up appointments as ordered.    Aggressive diabetes management of diabetes will greatly reduce the future risk of diabetes related complications such as CAD, CVA, PVD, and retinopathy/neuropathy/nephropathy.  Based on level of diabetes control: testing frequency ONE TIME PER DAY     I recommended that the patient investigate personal continuous glucose monitoring systems (such as Freestyle Terence or DexCom, etc.) as an alternative to the standard glucometer.  These systems allow for continuous glucose monitoring for 7-14 days and will provide much more data regarding glucose fluctuations, giving real time feedback on the effect of lifestyle and diet interventions, and medication effect.  I described the basics on how these devices operate and the potentially huge benefit that comes with closer observation of glucose levels.  Discussed the goal of \"time in the target range\" of 70-75% with very few , if any, low glucose readings (below 2% ideally)     Plan: Continuous Blood Gluc Sensor (FREESTYLE TERENCE 3        SENSOR) MISC, metFORMIN (GLUCOPHAGE XR) 500 MG         24 hr tablet, Hemoglobin A1c, Basic metabolic         panel, " "ALT            (E78.5) Hyperlipidemia LDL goal <100  Comment: This condition is currently controlled on the current medical regimen.  Continue current therapy.   Discussed guidelines recommending a statin cholesterol lowering medication for any patient with either diabetes and/or vascular disease, aiming for a LDL goal under 100 for sure, ideally under 70, using whatever dose of statin tolerated.    Plan:     (E66.9) Obesity (BMI 30-39.9)  Comment: obesity contirbutes to DM II and lipids, doing great in weight loss, lost the most amount of weight with keto diet, but that was too extreme.   Recommend paleo diet, perhaps 8:16 intermittent fasting.   Current BMI is: Body mass index is 32.85 kg/m ..  Reviewed weight patterns.   Obesity as a biological preventable and treatable disease, which is associated with significantly increased risk of many acute and chronic health conditions.   Obesity has now been recognized as a chronic disease by the American Medical Association.  Obesity has serious co-morbidities associated with obesity including increased risk for hypertension, stroke, coronary artery disease, dyslipidemia, Type II diabetes, depression, sleep apnea, cancers of the colon, breast and endometrium, obstructive sleep apnea, osteoarthritis and female infertility.  Recommended regular aerobic exercise, recommended improved diet aiming at lowering amount of processed foods, lower sugars, moderation/reduction of simple carbs and fat in the diet, establishing more regular meal times, always eating breakfast, front loading some of the calories and adding more protein to the diet.        Plan:            BMI:   Estimated body mass index is 32.85 kg/m  as calculated from the following:    Height as of this encounter: 1.854 m (6' 1\").    Weight as of this encounter: 112.9 kg (249 lb).           Ibrahima Campbell MD  [facility] SystemTIMMY Lucas is a 57 year old, presenting for " the following health issues:  RECHECK         View : No data to display.              HPI       1.  Diabetes:  In regards specifically to the patient's diabetes, they reports no unusual symptoms.  Medication compliance: good  Diabetic diet compliance: good    Patient reports no significant episodes of hypo- or hyperglycemia    Diabetic complications: none     Most  recent labs:    Lab Results   Component Value Date    A1C 6.4 06/09/2023    A1C 6.3 10/14/2022    A1C 5.7 10/27/2021    A1C 6.3 05/27/2021    A1C 7.7 02/23/2021    A1C 6.8 02/26/2020    A1C 6.0 09/14/2018    A1C 6.6 02/01/2016    A1C 6.4 10/06/2014    A1C 6.5 04/01/2014            2.    The patient has had a history of ongoing obesity.  Reviewed the weigth curves.   Their current BMI is:  Body mass index is 32.85 kg/m .  Reviewed previous attempts at weight loss which have not been successful in producing prolonged weigth loss.   Discussed current eating and exercise habits.     Reviewed weights in chart:  Wt Readings from Last 10 Encounters:   06/09/23 112.9 kg (249 lb)   01/16/23 114.1 kg (251 lb 9.6 oz)   04/13/22 108.8 kg (239 lb 14.4 oz)   04/11/22 112 kg (247 lb)   04/06/22 104.3 kg (230 lb)   10/27/21 105.2 kg (232 lb)   02/23/21 120.6 kg (265 lb 12.8 oz)   02/26/20 117.3 kg (258 lb 11.2 oz)   12/20/18 114.3 kg (251 lb 14.4 oz)   09/14/18 107.8 kg (237 lb 9.6 oz)         3.  Hyperlipidemia:  Has history of hyperlipidemia.    The patient is taking a medication for this.  Denies any significant side effects from his medication.      Latest labs reviewed:    Recent Labs   Lab Test 10/14/22  0755 10/27/21  0840   CHOL 148 175   HDL 50 49   LDL 81 109*   TRIG 84 84        Lab Results   Component Value Date    AST 31 04/06/2022    AST 30 05/27/2021           **I reviewed the information recorded in the patient's EPIC chart (including but not limited to medical history, surgical history, family history, problem list, medication list, and allergy list) and  "updated the information as indicated based on the patients reported information.           Review of Systems   Constitutional, HEENT, cardiovascular, pulmonary, gi and gu systems are negative, except as otherwise noted.      Objective    /76   Pulse 61   Temp 97.7  F (36.5  C) (Temporal)   Resp 16   Ht 1.854 m (6' 1\")   Wt 112.9 kg (249 lb)   BMI 32.85 kg/m    Body mass index is 32.85 kg/m .  Physical Exam   GENERAL alert and no distress  EYES:  Normal sclera,conjunctiva, EOMI  HENT: facies symmetric  MS: extremities- no gross deformities of the visible extremities noted,   PSYCH: Alert and oriented times 3; speech- coherent  SKIN:  No obvious significant skin lesions on visible portions of face   NEURO:  Normal facial movements.  Appears to move normally                 "

## 2023-06-09 NOTE — PATIENT INSTRUCTIONS
" Continue all medications at the same doses.  Contact your usual pharmacy if you need refills.      We are rechecking your labs.  I will let you know if the results indicate any changes in your therapy.  Unless you hear otherwise, continue all medications at the same doses.  Contact your usual pharmacy if you need refills.     Assuming your labs look good, then continue all medications at the same doses.  Contact your usual pharmacy if you need refills.      Return to see me in approximately 9 months, sooner if needed.  Please get fasting labs done at the [facility] or any other [facility] lab 1-2 days before this appointment (schedule a \"lab appointment\").  If you get the labs done at another clinic, make arrangements with them directly.  The orders will be in place.  Eat nothing for at least 8 hours prior to having these labs drawn.  Use [employer] or Call [phone] to schedule the appointment with me and lab appointment.          Investigate a personal continuous glucose monitoring  system (most common devices are Freestyle Terence and DexCom).  These systems allow for continuous glucose monitoring all throughout the day for 7-14 days and will show you the ways your glucose will fluctuate over this period by measuring your glucose every minute.  The sensor is applied to the back of your arm, and sits in place for up to 7-14 days, depending on which version you get.  You can really see how your diet and lifestyle changes affect your glucose levels.    If covered or reasonable expense, please consider getting it.      *  The goal for your diabetes control is to keep the glucose between , 70-75% of the time with very few low readings below 70.    Do NOT be alarmed if you see the glucose go very high, it always goes down.      *  Go to the \"settings\" button and Set the \"Target Range\" between .     *  At the end of the day, review the glucose readings and see if it was a good day for the " "diabetes or not.  If it was a good day ( time in target range 75% or better), then do more of the things that made it a good day.  If it was not a good day for the diabetes (time in target range LESS than 75% of the time), then review why it may have been a bad day for the diabetes and reconsider and change those variables moving forward.     *  The glucose readings from the continuous glucose monitor will not directly compare with those from a standard glucometer because they measure the glucose in slightly different ways, they are usually about 30-60 minutes later from what you would see on a standard glucometer.     Check their web sites for more details:      --Freestyle Terence: https://www.[employer].us/  For the FreestyleLIbre system, Check for eligibility for a voucher from the  for discounted Terence prices: 1 (800) 642-9580  (may cost no more than $75/month if you are eligible)    --Dexcom:  https://www.dexcom.[employer]/    In case you are worried about keeping the CGM sensor in place, the following are all patches meant to cover/secure medical devices.  All of them have device specific options (e.g., the patches for Dexcom have a rectangular hole cut, Terence and Guardian have no hole, etc.)  The first three are all available on Amazon.  Sugar Patch is ordered direct.  Sugar Patch is popular because it comes in many patterns and designs for those that want to use the patch as a fashion accessory.    * SimPatch  http://[employer].Sugar [employer] Media/  * GrifGrips  https://www.grifBridgewater Systems.com/  *Skin   https://theskingrip.com/  *Sugar Patch  https://theMadhouse Mediagarpatch.shop  *Hypafix is a good, all-purpose tape to cover most anything.  Waterproof and most people don't get any irritation.  I use the 2\" - 2 pack of 2\" x 30' rolls is $13.  https://www.[employer]/s?k=hypafix+tape        5 GOALS IN MANAGING DIABETES (i.e. to give the best chance to prevent diabetic complications and vascular disease):     1.  Aggressive " "diabetic management.  The target for A1C (3 months average blood sugar) is ideally below 6.5, preferably below 7.5    Your diabetes is under good control.      Lab Results   Component Value Date    A1C 6.3 10/14/2022    A1C 5.7 10/27/2021    A1C 6.3 05/27/2021    A1C 7.7 02/23/2021    A1C 6.8 02/26/2020    A1C 6.0 09/14/2018    A1C 6.6 02/01/2016    A1C 6.4 10/06/2014    A1C 6.5 04/01/2014       2.  Aggressive blood pressure control, under 130/80 ideally.  Using medications if needed.    Your blood pressure is under good control    BP Readings from Last 4 Encounters:   06/09/23 134/76   01/16/23 124/88   04/13/22 (!) 131/93   04/11/22 125/75       3.  Aggressive LDL cholesterol (bad cholesterol) lowering as needed.  Your goal is an LDL under 100 for sure, preferably under 70.     *  All patients with diabetes are recommended to be on a \"statin\" cholesterol lowering medication regardless of the cholesterol levels to give the best chance at avoiding vascular disease.      New guidelines identify four high-risk groups who could benefit from statins:   *people with pre-existing heart disease, such as those who have had a heart attack;   *people ages 40 to 75 who have diabetes of any type  *patients ages 40 to 75 with at least a 7.5% risk of developing cardiovascular disease over the next decade, according to a formula described in the guidelines  *patients with the sort of super-high cholesterol that sometimes runs in families, as evidenced by an LDL of 190 milligrams per deciliter or higher    *  Your cholesterol levels are well controlled.    Recent Labs   Lab Test 10/14/22  0755 10/27/21  0840   CHOL 148 175   HDL 50 49   LDL 81 109*   TRIG 84 84       4.  No smoking    5.  Consider daily preventative Aspirin once per day, every day over age 50 unless there is a specific reason that you cannot take aspirin.       *You should take Aspirin 81 mg once per day, unless you have a reason NOT to take aspirin (i.e. side " "effect, excessive bruising or bleeding, taking another \"blood tinning\" medication, etc.)       DIABETES REMINDERS:   1. Check your blood glucose regularly either with standard glucometer or personal continuous glucose monitor.    2) Your blood sugar goals:  before eating and  two hours after eating.  If using personal continuous glucose monitor, the goal is Time in the Target range ( ) of 70-75% with very few (under 2%) Below target.    3) Always be prepared to treat low blood sugar symptoms should it happen. Keep a sugar-containing beverage or food nearby.  4) When to call your clinic:     Blood sugar over 400     If you have 2 to 3 low blood sugars (under 70) in a row    Low readings the same time of day several days in a row  5) When to call 911: If your low blood glucose symptoms do not get better with treatment, or if you/someone else is unable to give you treatment.  6) Work with a Certified Diabetes Educator to assist you with your diabetes management  7) Contact us if you have ANY questions about your medications or instructions, or have problems with getting prescriptions filled.     "

## 2023-12-31 ENCOUNTER — MYC MEDICAL ADVICE (OUTPATIENT)
Dept: INTERNAL MEDICINE | Facility: CLINIC | Age: 58
End: 2023-12-31
Payer: COMMERCIAL

## 2023-12-31 ENCOUNTER — MYC REFILL (OUTPATIENT)
Dept: INTERNAL MEDICINE | Facility: CLINIC | Age: 58
End: 2023-12-31
Payer: COMMERCIAL

## 2023-12-31 DIAGNOSIS — N52.9 ERECTILE DYSFUNCTION, UNSPECIFIED ERECTILE DYSFUNCTION TYPE: ICD-10-CM

## 2024-01-03 ENCOUNTER — VIRTUAL VISIT (OUTPATIENT)
Dept: INTERNAL MEDICINE | Facility: CLINIC | Age: 59
End: 2024-01-03
Payer: COMMERCIAL

## 2024-01-03 DIAGNOSIS — J20.9 ACUTE BRONCHITIS WITH COEXISTING CONDITION REQUIRING PROPHYLACTIC TREATMENT: Primary | ICD-10-CM

## 2024-01-03 PROCEDURE — 99214 OFFICE O/P EST MOD 30 MIN: CPT | Mod: 95 | Performed by: INTERNAL MEDICINE

## 2024-01-03 RX ORDER — ALBUTEROL SULFATE 90 UG/1
2 AEROSOL, METERED RESPIRATORY (INHALATION) EVERY 6 HOURS PRN
Qty: 18 G | Refills: 1 | Status: SHIPPED | OUTPATIENT
Start: 2024-01-03 | End: 2024-02-28

## 2024-01-03 RX ORDER — AZITHROMYCIN 250 MG/1
TABLET, FILM COATED ORAL
Qty: 6 TABLET | Refills: 0 | Status: SHIPPED | OUTPATIENT
Start: 2024-01-03 | End: 2024-01-08

## 2024-01-03 RX ORDER — SILDENAFIL 100 MG/1
50-100 TABLET, FILM COATED ORAL DAILY PRN
Qty: 30 TABLET | Refills: 0 | Status: SHIPPED | OUTPATIENT
Start: 2024-01-03 | End: 2024-04-19

## 2024-01-03 ASSESSMENT — ENCOUNTER SYMPTOMS: COUGH: 1

## 2024-01-03 NOTE — PROGRESS NOTES
"Lance is a 58 year old who is being evaluated via a billable video visit.      How would you like to obtain your AVS? {AVS Preference:904445}  If the video visit is dropped, the invitation should be resent by: {video visit invitation (Optional) :094063}  Will anyone else be joining your video visit? {:887521}  {If patient encounters technical issues they should call [phone] :343907}        {PROVIDER CHARTING PREFERENCE:270847}    Subjective   Lance is a 58 year old, presenting for the following health issues:  No chief complaint on file.      History of Present Illness       Reason for visit:  Nagging illness  Symptom onset:  1-2 weeks ago  Symptoms include:  Stuffy.congested .cough .plugged ringing ears   Tired  Symptom intensity:  Moderate  Symptom progression:  Staying the same  Had these symptoms before:  No  What makes it better:  Claritan D    He eats 0-1 servings of fruits and vegetables daily.He consumes 0 sweetened beverage(s) daily.He exercises with enough effort to increase his heart rate 30 to 60 minutes per day.  He exercises with enough effort to increase his heart rate 6 days per week.   He is taking medications regularly.       {SUPERLIST (Optional):880367}  {additonal problems for provider to add (Optional):380853}      Review of Systems   {ROS COMP (Optional):447388}      Objective           Vitals:  No vitals were obtained today due to virtual visit.    Physical Exam   {video visit exam brief selected:707432}    {Diagnostic Test Results (Optional):628858}    {AMBULATORY ATTESTATION (Optional):753521}        Video-Visit Details    Type of service:  Video Visit     Originating Location (pt. Location): {video visit patient location:607608::\"Home\"}  {PROVIDER LOCATION On-site should be selected for visits conducted from your clinic location or adjoining [facility] hospital, academic office, or other nearby [facility] building. Off-site should be selected for all other provider locations, including " "home:311762}  Distant Location (provider location):  {virtual location provider:085774}  Platform used for Video Visit: {Virtual Visit Platforms:390547::\"[email or web address].[email or web address]\"}      "

## 2024-01-03 NOTE — PROGRESS NOTES
"Lance is a 58 year old who is being evaluated via a billable video visit.      How would you like to obtain your AVS? MyChart  If the video visit is dropped, the invitation should be resent by: Text to cell phone: [phone]  Will anyone else be joining your video visit? No          Assessment & Plan     Acute bronchitis with coexisting condition requiring prophylactic treatment  Current smoker with wheezing, most likely COPD  Zithromax prescribed  Albuterol prescribed  Continue OTC cough medications               Nicotine/Tobacco Cessation:  He reports that he has been smoking cigarettes. He has a 10 pack-year smoking history. He has never used smokeless tobacco.  Nicotine/Tobacco Cessation Plan:   Information offered: Patient not interested at this time      BMI:   Estimated body mass index is 32.85 kg/m  as calculated from the following:    Height as of 6/9/23: 1.854 m (6' 1\").    Weight as of 6/9/23: 112.9 kg (249 lb).           Kathi Shannon MD  [employer]    Subjective   Lance is a 58 year old, presenting for the following health issues:  Cough, Nasal Congestion, and Ear Problem      Cough  Associated symptoms include ear pain.   Ear Problem  Associated symptoms include cough.   History of Present Illness       Reason for visit:  Nagging illness  Symptom onset:  1-2 weeks ago  Symptoms include:  Stuffy.congested .cough .plugged ringing ears   Tired  Symptom intensity:  Moderate  Symptom progression:  Staying the same  Had these symptoms before:  No  What makes it better:  Claritan D    He eats 0-1 servings of fruits and vegetables daily.He consumes 0 sweetened beverage(s) daily.He exercises with enough effort to increase his heart rate 30 to 60 minutes per day.  He exercises with enough effort to increase his heart rate 6 days per week.   He is taking medications regularly.     Home COVID test was negative, Patient  has been having nausea.    Complaining of mild left leg " pain for 1 week.    Symptoms started over 2 weeks ago with a cold and runny nose.  Has been having cough with clear phlegm for the last few days , current smoker but denies asthma or COPD.  Has been noticing wheezing, not currently using inhalers.  Denies any shortness of breath or chest pain      Using OTC NyQuil    COVID-negative now, had COVID infection January 2020                Review of Systems   HENT:  Positive for ear pain.    Respiratory:  Positive for cough.       Constitutional, HEENT, cardiovascular, pulmonary, gi and gu systems are negative, except as otherwise noted.      Objective           Vitals:  No vitals were obtained today due to virtual visit.    Physical Exam   GENERAL: Healthy, alert and no distress  EYES: Eyes grossly normal to inspection.  No discharge or erythema, or obvious scleral/conjunctival abnormalities.  RESP: No audible wheeze, cough, or visible cyanosis.  No visible retractions or increased work of breathing.    SKIN: Visible skin clear. No significant rash, abnormal pigmentation or lesions.  NEURO: Cranial nerves grossly intact.  Mentation and speech appropriate for age.  PSYCH: Mentation appears normal, affect normal/bright, judgement and insight intact, normal speech and appearance well-groomed.                Video-Visit Details    Type of service:  Video Visit     Originating Location (pt. Location): Home    Distant Location (provider location):  On-site  Platform used for Video Visit: Erik

## 2024-01-04 ENCOUNTER — PATIENT OUTREACH (OUTPATIENT)
Dept: CARE COORDINATION | Facility: CLINIC | Age: 59
End: 2024-01-04
Payer: COMMERCIAL

## 2024-01-05 NOTE — TELEPHONE ENCOUNTER
Needs to be seen in virtual video appointment to review his diabetes and discuss medication changes

## 2024-01-06 ENCOUNTER — HEALTH MAINTENANCE LETTER (OUTPATIENT)
Age: 59
End: 2024-01-06

## 2024-01-18 ENCOUNTER — PATIENT OUTREACH (OUTPATIENT)
Dept: CARE COORDINATION | Facility: CLINIC | Age: 59
End: 2024-01-18
Payer: COMMERCIAL

## 2024-02-28 DIAGNOSIS — J20.9 ACUTE BRONCHITIS WITH COEXISTING CONDITION REQUIRING PROPHYLACTIC TREATMENT: ICD-10-CM

## 2024-02-28 RX ORDER — ALBUTEROL SULFATE 90 UG/1
2 AEROSOL, METERED RESPIRATORY (INHALATION) EVERY 6 HOURS PRN
Qty: 18 G | Refills: 1 | Status: SHIPPED | OUTPATIENT
Start: 2024-02-28

## 2024-03-16 ENCOUNTER — HEALTH MAINTENANCE LETTER (OUTPATIENT)
Age: 59
End: 2024-03-16

## 2024-03-23 DIAGNOSIS — E66.9 OBESITY (BMI 30-39.9): ICD-10-CM

## 2024-03-23 DIAGNOSIS — E11.9 TYPE 2 DIABETES MELLITUS WITHOUT COMPLICATION, WITHOUT LONG-TERM CURRENT USE OF INSULIN (H): ICD-10-CM

## 2024-03-23 DIAGNOSIS — E78.5 HYPERLIPIDEMIA LDL GOAL <100: Primary | ICD-10-CM

## 2024-03-23 DIAGNOSIS — Z12.5 SCREENING FOR PROSTATE CANCER: ICD-10-CM

## 2024-03-23 DIAGNOSIS — E78.5 HYPERLIPIDEMIA LDL GOAL <160: ICD-10-CM

## 2024-03-25 RX ORDER — METFORMIN HCL 500 MG
1000 TABLET, EXTENDED RELEASE 24 HR ORAL
Qty: 180 TABLET | Refills: 0 | Status: SHIPPED | OUTPATIENT
Start: 2024-03-25 | End: 2024-04-19

## 2024-03-25 RX ORDER — ROSUVASTATIN CALCIUM 10 MG/1
10 TABLET, COATED ORAL DAILY
Qty: 90 TABLET | Refills: 0 | Status: SHIPPED | OUTPATIENT
Start: 2024-03-25 | End: 2024-04-19

## 2024-04-19 ENCOUNTER — OFFICE VISIT (OUTPATIENT)
Dept: INTERNAL MEDICINE | Facility: CLINIC | Age: 59
End: 2024-04-19
Payer: COMMERCIAL

## 2024-04-19 VITALS
RESPIRATION RATE: 16 BRPM | OXYGEN SATURATION: 99 % | HEIGHT: 73 IN | BODY MASS INDEX: 31.98 KG/M2 | DIASTOLIC BLOOD PRESSURE: 80 MMHG | WEIGHT: 241.3 LBS | TEMPERATURE: 97.4 F | HEART RATE: 59 BPM | SYSTOLIC BLOOD PRESSURE: 134 MMHG

## 2024-04-19 DIAGNOSIS — Z23 NEED FOR TDAP VACCINATION: ICD-10-CM

## 2024-04-19 DIAGNOSIS — E78.5 HYPERLIPIDEMIA LDL GOAL <160: ICD-10-CM

## 2024-04-19 DIAGNOSIS — Z87.891 HISTORY OF TOBACCO USE, PRESENTING HAZARDS TO HEALTH: ICD-10-CM

## 2024-04-19 DIAGNOSIS — E11.9 TYPE 2 DIABETES MELLITUS WITHOUT COMPLICATION, WITHOUT LONG-TERM CURRENT USE OF INSULIN (H): Primary | ICD-10-CM

## 2024-04-19 DIAGNOSIS — M25.472 LEFT ANKLE SWELLING: ICD-10-CM

## 2024-04-19 DIAGNOSIS — N52.9 ERECTILE DYSFUNCTION, UNSPECIFIED ERECTILE DYSFUNCTION TYPE: ICD-10-CM

## 2024-04-19 DIAGNOSIS — Z12.5 SCREENING FOR PROSTATE CANCER: ICD-10-CM

## 2024-04-19 PROBLEM — M25.462 EFFUSION OF LEFT KNEE: Status: ACTIVE | Noted: 2021-01-29

## 2024-04-19 PROBLEM — S83.232A COMPLEX TEAR OF MEDIAL MENISCUS OF LEFT KNEE AS CURRENT INJURY: Status: ACTIVE | Noted: 2021-02-12

## 2024-04-19 PROBLEM — Z13.228 ENCOUNTER FOR SCREENING FOR OTHER METABOLIC DISORDERS: Status: ACTIVE | Noted: 2024-04-19

## 2024-04-19 PROBLEM — M25.562 ACUTE PAIN OF LEFT KNEE: Status: ACTIVE | Noted: 2021-01-29

## 2024-04-19 PROBLEM — T14.8XXA CONTUSION OF BONE: Status: ACTIVE | Noted: 2021-02-12

## 2024-04-19 LAB
HBA1C MFR BLD: 6.8 % (ref 0–5.6)
HGB BLD-MCNC: 16.5 G/DL (ref 13.3–17.7)

## 2024-04-19 PROCEDURE — 82570 ASSAY OF URINE CREATININE: CPT | Performed by: INTERNAL MEDICINE

## 2024-04-19 PROCEDURE — 80048 BASIC METABOLIC PNL TOTAL CA: CPT | Performed by: INTERNAL MEDICINE

## 2024-04-19 PROCEDURE — 83036 HEMOGLOBIN GLYCOSYLATED A1C: CPT | Performed by: INTERNAL MEDICINE

## 2024-04-19 PROCEDURE — G0103 PSA SCREENING: HCPCS | Performed by: INTERNAL MEDICINE

## 2024-04-19 PROCEDURE — 90715 TDAP VACCINE 7 YRS/> IM: CPT | Performed by: INTERNAL MEDICINE

## 2024-04-19 PROCEDURE — 80061 LIPID PANEL: CPT | Performed by: INTERNAL MEDICINE

## 2024-04-19 PROCEDURE — 85018 HEMOGLOBIN: CPT | Performed by: INTERNAL MEDICINE

## 2024-04-19 PROCEDURE — 36415 COLL VENOUS BLD VENIPUNCTURE: CPT | Performed by: INTERNAL MEDICINE

## 2024-04-19 PROCEDURE — 84460 ALANINE AMINO (ALT) (SGPT): CPT | Performed by: INTERNAL MEDICINE

## 2024-04-19 PROCEDURE — 90471 IMMUNIZATION ADMIN: CPT | Performed by: INTERNAL MEDICINE

## 2024-04-19 PROCEDURE — 99214 OFFICE O/P EST MOD 30 MIN: CPT | Mod: 25 | Performed by: INTERNAL MEDICINE

## 2024-04-19 PROCEDURE — 82043 UR ALBUMIN QUANTITATIVE: CPT | Performed by: INTERNAL MEDICINE

## 2024-04-19 RX ORDER — METFORMIN HCL 500 MG
1000 TABLET, EXTENDED RELEASE 24 HR ORAL
Qty: 180 TABLET | Refills: 1 | Status: SHIPPED | OUTPATIENT
Start: 2024-04-19

## 2024-04-19 RX ORDER — ROSUVASTATIN CALCIUM 10 MG/1
10 TABLET, COATED ORAL DAILY
Qty: 90 TABLET | Refills: 1 | Status: SHIPPED | OUTPATIENT
Start: 2024-04-19

## 2024-04-19 RX ORDER — SILDENAFIL 100 MG/1
50-100 TABLET, FILM COATED ORAL DAILY PRN
Qty: 30 TABLET | Refills: 5 | Status: SHIPPED | OUTPATIENT
Start: 2024-04-19

## 2024-04-19 ASSESSMENT — PAIN SCALES - GENERAL: PAINLEVEL: MILD PAIN (3)
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
## 2024-04-19 NOTE — PATIENT INSTRUCTIONS
" We are rechecking your labs.  I will let you know if the results indicate any changes in your therapy.  Unless you hear otherwise, continue all medications at the same doses.  Contact your usual pharmacy if you need refills.     Assuming your labs look good, then continue all medications at the same doses.  Contact your usual pharmacy if you need refills.      Return to see me in approximately 6 months, sooner if needed.  Please get nonfasting labs done in the [facility] or at any other [facility] lab 1-2 days before this appointment.  If you get the labs done at another clinic, make arrangements with that clinic directly.  The orders will be in place.  Use [employer] or Call [phone] to schedule the appointment with me and lab appointment.           5 GOALS IN MANAGING DIABETES (i.e. to give the best chance to prevent diabetic complications and vascular disease):     1.  Aggressive diabetic management.  The target for A1C (3 months average blood sugar) is ideally below 6.5, preferably below 7.5    Your diabetes is under good control.      Lab Results   Component Value Date    A1C 6.4 06/09/2023    A1C 6.3 10/14/2022    A1C 5.7 10/27/2021    A1C 6.3 05/27/2021    A1C 7.7 02/23/2021    A1C 6.8 02/26/2020    A1C 6.0 09/14/2018    A1C 6.6 02/01/2016    A1C 6.4 10/06/2014    A1C 6.5 04/01/2014       2.  Aggressive blood pressure control, under 130/80 ideally.  Using medications if needed.    Your blood pressure is under good control    BP Readings from Last 4 Encounters:   04/19/24 134/80   06/09/23 134/76   01/16/23 124/88   04/13/22 (!) 131/93       3.  Aggressive LDL cholesterol (bad cholesterol) lowering as needed.  Your goal is an LDL under 100 for sure, preferably under 70.     *  All patients with diabetes are recommended to be on a \"statin\" cholesterol lowering medication regardless of the cholesterol levels to give the best chance at avoiding vascular disease.      New guidelines identify four " "high-risk groups who could benefit from statins:   *people with pre-existing heart disease, such as those who have had a heart attack;   *people ages 40 to 75 who have diabetes of any type  *patients ages 40 to 75 with at least a 7.5% risk of developing cardiovascular disease over the next decade, according to a formula described in the guidelines  *patients with the sort of super-high cholesterol that sometimes runs in families, as evidenced by an LDL of 190 milligrams per deciliter or higher    *  Your cholesterol levels are well controlled.    Recent Labs   Lab Test 10/14/22  0755 10/27/21  0840   CHOL 148 175   HDL 50 49   LDL 81 109*   TRIG 84 84       4.  No smoking    5.  Consider daily preventative Aspirin once per day, every day over age 50 unless there is a specific reason that you cannot take aspirin.       *You should take Aspirin 81 mg once per day, unless you have a reason NOT to take aspirin (i.e. side effect, excessive bruising or bleeding, taking another \"blood tinning\" medication, etc.)       DIABETES REMINDERS:   1. Check your blood glucose regularly either with standard glucometer or personal continuous glucose monitor.    2) Your blood sugar goals:  before eating and  two hours after eating.  If using personal continuous glucose monitor, the goal is Time in the Target range ( ) of 70-75% with very few (under 2%) Below target.    3) Always be prepared to treat low blood sugar symptoms should it happen. Keep a sugar-containing beverage or food nearby.  4) When to call your clinic:     Blood sugar over 400     If you have 2 to 3 low blood sugars (under 70) in a row    Low readings the same time of day several days in a row  5) When to call 911: If your low blood glucose symptoms do not get better with treatment, or if you/someone else is unable to give you treatment.  6) Work with a Certified Diabetes Educator to assist you with your diabetes management  7) Contact us if you have " ANY questions about your medications or instructions, or have problems with getting prescriptions filled.

## 2024-04-20 LAB
ALT SERPL W P-5'-P-CCNC: 29 U/L (ref 0–70)
ANION GAP SERPL CALCULATED.3IONS-SCNC: 11 MMOL/L (ref 7–15)
BUN SERPL-MCNC: 12.9 MG/DL (ref 8–23)
CALCIUM SERPL-MCNC: 9.6 MG/DL (ref 8.8–10.2)
CHLORIDE SERPL-SCNC: 100 MMOL/L (ref 98–107)
CHOLEST SERPL-MCNC: 152 MG/DL
CREAT SERPL-MCNC: 1.02 MG/DL (ref 0.67–1.17)
CREAT UR-MCNC: 48.6 MG/DL
DEPRECATED HCO3 PLAS-SCNC: 25 MMOL/L (ref 22–29)
EGFRCR SERPLBLD CKD-EPI 2021: 84 ML/MIN/1.73M2
FASTING STATUS PATIENT QL REPORTED: YES
GLUCOSE SERPL-MCNC: 126 MG/DL (ref 70–99)
HDLC SERPL-MCNC: 46 MG/DL
LDLC SERPL CALC-MCNC: 87 MG/DL
MICROALBUMIN UR-MCNC: <12 MG/L
MICROALBUMIN/CREAT UR: NORMAL MG/G{CREAT}
NONHDLC SERPL-MCNC: 106 MG/DL
POTASSIUM SERPL-SCNC: 4.7 MMOL/L (ref 3.4–5.3)
PSA SERPL DL<=0.01 NG/ML-MCNC: 0.84 NG/ML (ref 0–4.5)
SODIUM SERPL-SCNC: 136 MMOL/L (ref 135–145)
TRIGL SERPL-MCNC: 95 MG/DL

## 2024-07-14 ENCOUNTER — PATIENT OUTREACH (OUTPATIENT)
Dept: CARE COORDINATION | Facility: CLINIC | Age: 59
End: 2024-07-14
Payer: COMMERCIAL

## 2024-09-12 ENCOUNTER — TRANSFERRED RECORDS (OUTPATIENT)
Dept: MULTI SPECIALTY CLINIC | Facility: CLINIC | Age: 59
End: 2024-09-12

## 2024-09-12 LAB — RETINOPATHY: NORMAL

## 2024-12-04 ENCOUNTER — OFFICE VISIT (OUTPATIENT)
Dept: INTERNAL MEDICINE | Facility: CLINIC | Age: 59
End: 2024-12-04
Payer: COMMERCIAL

## 2024-12-04 VITALS
OXYGEN SATURATION: 98 % | RESPIRATION RATE: 17 BRPM | HEIGHT: 72 IN | SYSTOLIC BLOOD PRESSURE: 130 MMHG | WEIGHT: 255 LBS | HEART RATE: 87 BPM | DIASTOLIC BLOOD PRESSURE: 60 MMHG | BODY MASS INDEX: 34.54 KG/M2 | TEMPERATURE: 98.5 F

## 2024-12-04 DIAGNOSIS — Z00.00 ENCOUNTER FOR GENERAL ADULT MEDICAL EXAMINATION W/O ABNORMAL FINDINGS: Primary | ICD-10-CM

## 2024-12-04 DIAGNOSIS — E11.9 TYPE 2 DIABETES MELLITUS WITHOUT COMPLICATION, WITHOUT LONG-TERM CURRENT USE OF INSULIN (H): ICD-10-CM

## 2024-12-04 DIAGNOSIS — Z87.891 PERSONAL HISTORY OF TOBACCO USE: ICD-10-CM

## 2024-12-04 DIAGNOSIS — E78.5 HYPERLIPIDEMIA LDL GOAL <160: ICD-10-CM

## 2024-12-04 LAB
ANION GAP SERPL CALCULATED.3IONS-SCNC: 10 MMOL/L (ref 7–15)
BUN SERPL-MCNC: 19.6 MG/DL (ref 8–23)
CALCIUM SERPL-MCNC: 9.8 MG/DL (ref 8.8–10.4)
CHLORIDE SERPL-SCNC: 100 MMOL/L (ref 98–107)
CREAT SERPL-MCNC: 1.02 MG/DL (ref 0.67–1.17)
EGFRCR SERPLBLD CKD-EPI 2021: 84 ML/MIN/1.73M2
EST. AVERAGE GLUCOSE BLD GHB EST-MCNC: 166 MG/DL
GLUCOSE SERPL-MCNC: 134 MG/DL (ref 70–99)
HBA1C MFR BLD: 7.4 % (ref 0–5.6)
HCO3 SERPL-SCNC: 26 MMOL/L (ref 22–29)
POTASSIUM SERPL-SCNC: 4.3 MMOL/L (ref 3.4–5.3)
SODIUM SERPL-SCNC: 136 MMOL/L (ref 135–145)

## 2024-12-04 PROCEDURE — 99213 OFFICE O/P EST LOW 20 MIN: CPT | Mod: 25 | Performed by: INTERNAL MEDICINE

## 2024-12-04 PROCEDURE — G0296 VISIT TO DETERM LDCT ELIG: HCPCS | Performed by: INTERNAL MEDICINE

## 2024-12-04 PROCEDURE — 36415 COLL VENOUS BLD VENIPUNCTURE: CPT | Performed by: INTERNAL MEDICINE

## 2024-12-04 PROCEDURE — 99396 PREV VISIT EST AGE 40-64: CPT | Performed by: INTERNAL MEDICINE

## 2024-12-04 PROCEDURE — 83036 HEMOGLOBIN GLYCOSYLATED A1C: CPT | Performed by: INTERNAL MEDICINE

## 2024-12-04 PROCEDURE — 80048 BASIC METABOLIC PNL TOTAL CA: CPT | Performed by: INTERNAL MEDICINE

## 2024-12-04 RX ORDER — ROSUVASTATIN CALCIUM 10 MG/1
10 TABLET, COATED ORAL DAILY
Qty: 90 TABLET | Refills: 3 | Status: SHIPPED | OUTPATIENT
Start: 2024-12-04

## 2024-12-04 RX ORDER — METFORMIN HYDROCHLORIDE 500 MG/1
1000 TABLET, EXTENDED RELEASE ORAL
Qty: 180 TABLET | Refills: 3 | Status: SHIPPED | OUTPATIENT
Start: 2024-12-04

## 2024-12-04 SDOH — HEALTH STABILITY: PHYSICAL HEALTH: ON AVERAGE, HOW MANY DAYS PER WEEK DO YOU ENGAGE IN MODERATE TO STRENUOUS EXERCISE (LIKE A BRISK WALK)?: 0 DAYS

## 2024-12-04 ASSESSMENT — SOCIAL DETERMINANTS OF HEALTH (SDOH): HOW OFTEN DO YOU GET TOGETHER WITH FRIENDS OR RELATIVES?: ONCE A WEEK

## 2024-12-04 NOTE — PROGRESS NOTES
Lung Cancer Screening Shared Decision Making Visit     Ricki Blanchard, a 59 year old male, is eligible for lung cancer screening    History   Smoking Status     Every Day     Packs/day: 0.50     Years: 20.00     Types: Cigarettes     Last attempt to quit: 12/12/2011   Smokeless Tobacco     Never       I have discussed with patient the risks and benefits of screening for lung cancer with low-dose CT.     The risks include:    radiation exposure: one low dose chest CT has as much ionizing radiation as about 15 chest x-rays, or 6 months of background radiation living in [place]      false positives: most findings/nodules are NOT cancer, but some might still require additional diagnostic evaluation, including biopsy    over-diagnosis: some slow growing cancers that might never have been clinically significant will be detected and treated unnecessarily     The benefit of early detection of lung cancer is contingent upon adherence to annual screening or more frequent follow up if indicated.     Furthermore, to benefit from screening, Ricki must be willing and able to undergo diagnostic procedures, if indicated. Although no specific guide is available for determining severity of comorbidities, it is reasonable to withhold screening in patients who have greater mortality risk from other diseases.     We did discuss that the best way to prevent lung cancer is to not smoke.    Some patients may value a numeric estimation of lung cancer risk when evaluating if lung cancer screening is right for them, here is one calculator:    ShouldIScreen

## 2024-12-04 NOTE — PATIENT INSTRUCTIONS
Lung Cancer Screening   Frequently Asked Questions  If you are at high-risk for lung cancer, getting screened with low-dose computed tomography (LDCT) every year can help save your life. This handout offers answers to some of the most common questions about lung cancer screening. If you have other questions, please call [phone] ([phone]).     What is it?  Lung cancer screening uses special X-ray technology to create an image of your lung tissue. The exam is quick and easy and takes less than 10 seconds. We don t give you any medicine or use any needles. You can eat before and after the exam. You don t need to change your clothes as long as the clothing on your chest doesn t contain metal. But, you do need to be able to hold your breath for at least 6 seconds during the exam.    What is the goal of lung cancer screening?  The goal of lung cancer screening is to save lives. Many times, lung cancer is not found until a person starts having physical symptoms. Lung cancer screening can help detect lung cancer in the earliest stages when it may be easier to treat.    Who should be screened for lung cancer?  We suggest lung cancer screening for anyone who is at high-risk for lung cancer. You are in the high-risk group if you:      are between the ages of 55 and 79, and    have smoked at least 1 pack of cigarettes a day for 20 or more years, and    still smoke or have quit within the past 15 years.    However, if you have a new cough or shortness of breath, you should talk to your doctor before being screened.    Why does it matter if I have symptoms?  Certain symptoms can be a sign that you have a condition in your lungs that should be checked and treated by your doctor. These symptoms include fever, chest pain, a new or changing cough, shortness of breath that you have never felt before, coughing up blood or unexplained weight loss. Having any of these symptoms can greatly affect the results of lung  cancer screening.       Should all smokers get an LDCT lung cancer screening exam?  It depends. Lung cancer screening is for a very specific group of men and women who have a history of heavy smoking over a long period of time (see  Who should be screened for lung cancer  above).  I am in the high-risk group, but have been diagnosed with cancer in the past. Is LDCT lung cancer screening right for me?  In some cases, you should not have LDCT lung screening, such as when your doctor is already following your cancer with CT scan studies. Your doctor will help you decide if LDCT lung screening is right for you.  Do I need to have a screening exam every year?  Yes. If you are in the high-risk group described earlier, you should get an LDCT lung cancer screening exam every year until you are 79, or are no longer willing or able to undergo screening and possible procedures to diagnose and treat lung cancer.  How effective is LDCT at preventing death from lung cancer?  Studies have shown that LDCT lung cancer screening can lower the risk of death from lung cancer by 20 percent in people who are at high-risk.  What are the risks?  There are some risks and limitations of LDCT lung cancer screening. We want to make sure you understand the risks and benefits, so please let us know if you have any questions. Your doctor may want to talk with you more about these risks.    Radiation exposure: As with any exam that uses radiation, there is a very small increased risk of cancer. The amount of radiation in LDCT is small--about the same amount a person would get from a mammogram. Your doctor orders the exam when he or she feels the potential benefits outweigh the risks.    False negatives: No test is perfect, including LDCT. It is possible that you may have a medical condition, including lung cancer, that is not found during your exam. This is called a false negative result.    False positives and more testing: LDCT very often finds  something in the lung that could be cancer, but in fact is not. This is called a false positive result. False positive tests often cause anxiety. To make sure these findings are not cancer, you may need to have more tests. These tests will be done only if you give us permission. Sometimes patients need a treatment that can have side effects, such as a biopsy. For more information on false positives, see  What can I expect from the results?     Findings not related to lung cancer: Your LDCT exam also takes pictures of areas of your body next to your lungs. In a very small number of cases, the CT scan will show an abnormal finding in one of these areas, such as your kidneys, adrenal glands, liver or thyroid. This finding may not be serious, but you may need more tests. Your doctor can help you decide what other tests you may need, if any.  What can I expect from the results?  About 1 out of 4 LDCT exams will find something that may need more tests. Most of the time, these findings are lung nodules. Lung nodules are very small collections of tissue in the lung. These nodules are very common, and the vast majority--more than 97 percent--are not cancer (benign). Most are normal lymph nodes or small areas of scarring from past infections.  But, if a small lung nodule is found to be cancer, the cancer can be cured more than 90 percent of the time. To know if the nodule is cancer, we may need to get more images before your next yearly screening exam. If the nodule has suspicious features (for example, it is large, has an odd shape or grows over time), we will refer you to a specialist for further testing.  Will my doctor also get the results?  Yes. Your doctor will get a copy of your results.  Is it okay to keep smoking now that there s a cancer screening exam?  No. Tobacco is one of the strongest cancer-causing agents. It causes not only lung cancer, but other cancers and cardiovascular (heart) diseases as well. The damage  caused by smoking builds over time. This means that the longer you smoke, the higher your risk of disease. While it is never too late to quit, the sooner you quit, the better.  Where can I find help to quit smoking?  The best way to prevent lung cancer is to stop smoking. If you have already quit smoking, congratulations and keep it up! For help on quitting smoking, please call [employer] at 6-281-QUITNOW (1-471.304.7185) or the American [employer] Society at 1-668.939.2977 to find local resources near you.  One-on-one health coaching:  If you d prefer to work individually with a health care provider on tobacco cessation, we offer:      Medication Therapy Management:  Our specially trained pharmacists work closely with you and your doctor to help you quit smoking.  Call [phone] or [phone] (toll free).

## 2024-12-04 NOTE — PROGRESS NOTES
Preventive Care Visit  [facility]  Chato Mcelroy MD, Internal Medicine  Dec 4, 2024      Assessment & Plan     Encounter for general adult medical examination w/o abnormal findings  Discussed cardiac disease risk factor modification including screening for and treating HTN, lipids, DM, and smoking cessation.  Also discussed age appropriate cancer screening recommendations including testicular, prostate, colon and lung cancer as dictated by age group.  Recommended low fat, low salt diet and moderation in any alcohol intake.  Recommended always using seatbelts when in a car.  Recommended never driving after drinking or riding with someone who has been drinking as well.     Type 2 diabetes mellitus without complication, without long-term current use of insulin (H)  Discussed options, will add low-dose Ozempic.  Recheck glycosylated hemoglobin today.  Administration and potential side effects of Ozempic reviewed with patient.  Recheck virtually in 1 month to assess progress.  - HEMOGLOBIN A1C; Future  - BASIC METABOLIC PANEL; Future  - semaglutide (OZEMPIC) 2 MG/3ML pen; Inject 0.25 mg subcutaneously every 7 days.  - metFORMIN (GLUCOPHAGE XR) 500 MG 24 hr tablet; Take 2 tablets (1,000 mg) by mouth daily (with dinner).  - rosuvastatin (CRESTOR) 10 MG tablet; Take 1 tablet (10 mg) by mouth daily.  - HEMOGLOBIN A1C  - BASIC METABOLIC PANEL    Hyperlipidemia LDL goal <160  Reasonably well-controlled  - rosuvastatin (CRESTOR) 10 MG tablet; Take 1 tablet (10 mg) by mouth daily.    Personal history of tobacco use  Agrees to lung cancer screening  - Prof fee: Shared Decision Making for Lung Cancer Screening  - CT Chest Lung Cancer Scrn Low Dose wo; Future    Patient has been advised of split billing requirements and indicates understanding: Yes        Nicotine/Tobacco Cessation  He reports that he has been smoking cigarettes. He started smoking about 33 years ago. He has a 10 pack-year smoking  history. He has never used smokeless tobacco.  Nicotine/Tobacco Cessation Plan  Information offered: Patient not interested at this time      BMI  Estimated body mass index is 34.58 kg/m  as calculated from the following:    Height as of this encounter: 1.829 m (6').    Weight as of this encounter: 115.7 kg (255 lb).   Weight management plan: Discussed healthy diet and exercise guidelines    Counseling  Appropriate preventive services were addressed with this patient via screening, questionnaire, or discussion as appropriate for fall prevention, nutrition, physical activity, Tobacco-use cessation, social engagement, weight loss and cognition.  Checklist reviewing preventive services available has been given to the patient.  Reviewed patient's diet, addressing concerns and/or questions.           Pako Lucas is a 59 year old, presenting for the following:  Physical (Not fasting)         Via the Health Maintenance questionnaire, the patient has reported the following services have been completed -Eye Exam: [email or web address] 2024-09-12, this information has been sent to the abstraction team.    History of Present Illness       Diabetes:   He presents for follow up of diabetes.  He is checking home blood glucose a few times a month.   He checks blood glucose before meals.  Blood glucose is never over 200 and never under 70. He is aware of hypoglycemia symptoms including dizziness.    He has no concerns regarding his diabetes at this time.  He is having weight gain.  The patient has not had a diabetic eye exam in the last 12 months.          He eats 0-1 servings of fruits and vegetables daily.He consumes 0 sweetened beverage(s) daily.He exercises with enough effort to increase his heart rate 10 to 19 minutes per day.  He exercises with enough effort to increase his heart rate 3 or less days per week.   He is taking medications regularly.      Pt would like to switch metformin to an injection like  ozempic.    As above.  Also due for surveillance labs today.  Continues on metformin monotherapy for type 2 diabetes, has gained some weight recently.          Health Care Directive  Patient does not have a Health Care Directive: Discussed advance care planning with patient; information given to patient to review.      12/4/2024   General Health   How would you rate your overall physical health? (!) FAIR   Feel stress (tense, anxious, or unable to sleep) Only a little      (!) STRESS CONCERN      12/4/2024   Nutrition   Three or more servings of calcium each day? (!) NO   Diet: Carbohydrate counting   How many servings of fruit and vegetables per day? (!) 0-1   How many sweetened beverages each day? 0-1            12/4/2024   Exercise   Days per week of moderate/strenous exercise 0 days      (!) EXERCISE CONCERN      12/4/2024   Social Factors   Frequency of gathering with friends or relatives Once a week   Worry food won't last until get money to buy more No   Food not last or not have enough money for food? No   Do you have housing? (Housing is defined as stable permanent housing and does not include staying ouside in a car, in a tent, in an abandoned building, in an overnight shelter, or couch-surfing.) Yes   Are you worried about losing your housing? No   Lack of transportation? No   Unable to get utilities (heat,electricity)? No            12/4/2024   Fall Risk   Fallen 2 or more times in the past year? No    Trouble with walking or balance? No        Patient-reported          12/4/2024   Dental   Dentist two times every year? Yes            12/4/2024   TB Screening   Were you born outside of the [place]? No                  12/4/2024   Substance Use   Alcohol more than 3/day or more than 7/wk No   Do you use any other substances recreationally? No        Social History     Tobacco Use    Smoking status: Every Day     Current packs/day: 0.00     Average packs/day: 0.5 packs/day for 20.0 years (10.0 ttl pk-yrs)      Types: Cigarettes     Start date: 1991     Last attempt to quit: 2011     Years since quittin.9    Smokeless tobacco: Never    Tobacco comments:     1/2-1 pk daily   Vaping Use    Vaping status: Never Used   Substance Use Topics    Alcohol use: No     Alcohol/week: 0.0 standard drinks of alcohol    Drug use: No           2024   STI Screening   New sexual partner(s) since last STI/HIV test? No      Last PSA:   PSA   Date Value Ref Range Status   2020 0.76 0 - 4 ug/L Final     Comment:     Assay Method:  Chemiluminescence using Siemens Vista analyzer     Prostate Specific Antigen Screen   Date Value Ref Range Status   2024 0.84 0.00 - 4.50 ng/mL Final     ASCVD Risk   The 10-year ASCVD risk score (Lavelle FINE, et al., 2019) is: 22.5%    Values used to calculate the score:      Age: 59 years      Sex: Male      Is Non- : No      Diabetic: Yes      Tobacco smoker: Yes      Systolic Blood Pressure: 130 mmHg      Is BP treated: No      HDL Cholesterol: 46 mg/dL      Total Cholesterol: 152 mg/dL           Reviewed and updated as needed this visit by Provider                          Review of Systems  Constitutional, HEENT, cardiovascular, pulmonary, gi and gu systems are negative, except as otherwise noted.     Objective    Exam  /60 (BP Location: Left arm, Patient Position: Sitting, Cuff Size: Adult Large)   Pulse 87   Temp 98.5  F (36.9  C) (Temporal)   Resp 17   Ht 1.829 m (6')   Wt 115.7 kg (255 lb)   SpO2 98%   BMI 34.58 kg/m     Estimated body mass index is 34.58 kg/m  as calculated from the following:    Height as of this encounter: 1.829 m (6').    Weight as of this encounter: 115.7 kg (255 lb).    Physical Exam  GENERAL: alert and no distress  NECK: no adenopathy, no asymmetry, masses, or scars  RESP: lungs clear to auscultation - no rales, rhonchi or wheezes  CV: regular rate and rhythm, normal S1 S2, no S3 or S4, no murmur, click or  rub, no peripheral edema  ABDOMEN: soft, nontender, no hepatosplenomegaly, no masses and bowel sounds normal  MS: no gross musculoskeletal defects noted, no edema        Signed Electronically by: Chato Mcelroy MD

## 2024-12-09 ENCOUNTER — TELEPHONE (OUTPATIENT)
Dept: INTERNAL MEDICINE | Facility: CLINIC | Age: 59
End: 2024-12-09
Payer: COMMERCIAL

## 2024-12-10 DIAGNOSIS — E11.9 TYPE 2 DIABETES MELLITUS WITHOUT COMPLICATION, WITHOUT LONG-TERM CURRENT USE OF INSULIN (H): ICD-10-CM

## 2024-12-10 NOTE — TELEPHONE ENCOUNTER
Please see telephone encounter 12/10/24.     Yuridia VILLASENOR RN  [facility] Triage Team

## 2024-12-10 NOTE — TELEPHONE ENCOUNTER
Medication Question or Refill    Contacts       Contact Date/Time Type Contact Phone/Fax    12/10/2024 08:03 AM CST Phone (Incoming) Lance Blanchard (Self) [phone] (M)            What medication are you calling about (include dose and sig)?: Semaglutide 2MG    Preferred Pharmacy:   [facility]/pharmacy #[id number] [facility] [address] [address]  Phone: [phone] Fax: [phone]        Controlled Substance Agreement on file:   CSA -- Patient Level:    CSA: None found at the patient level.       Who prescribed the medication?: Dr. Mcelroy    Do you need a refill? Yes, Pharmacy said prescription was written wrong. Need to be written for 9 for 90 or 3 for 30.     When did you use the medication last? Has not started yet.     Patient offered an appointment? No    Do you have any questions or concerns?  Yes: Please see above regarding quantity.       Could we send this information to you in [employer] or would you prefer to receive a phone call?:   Patient would prefer a phone call   Okay to leave a detailed message?: Yes at Home number on file [phone] (home)

## 2024-12-10 NOTE — TELEPHONE ENCOUNTER
Patient called the clinic and stated that he was told be the pharmacy that the prescription for Ozempic was written wrong. The pharmacy told him the it needs to be written as 3 for 30 or 9 for 90. The prescription was written to be dispensed at 3 ml. Triage to call the pharmacy for clarification on what is needed.     Yuridia VILLASENOR RN  [facility] Triage Team

## 2024-12-10 NOTE — TELEPHONE ENCOUNTER
Dr. Mcelroy, please advise.    Triage nurse spoke to pharmacy staff - staff indicated that the order details needed to include the mention of how many weeks the pt would be injecting the 0.25mg and a plan to increase to 0.5mg, so that insurance can confirm that the order is reaching therapeutic level.    Triage nurse updated order notes - please review CAREFULLY.    Marisel Salter RN

## 2024-12-11 ENCOUNTER — MYC MEDICAL ADVICE (OUTPATIENT)
Dept: INTERNAL MEDICINE | Facility: CLINIC | Age: 59
End: 2024-12-11
Payer: COMMERCIAL

## 2024-12-12 NOTE — TELEPHONE ENCOUNTER
Please edit sig to include number of weeks at each dose so pharmacy can fill.    Susy Carmona RN

## 2024-12-13 ENCOUNTER — ANCILLARY PROCEDURE (OUTPATIENT)
Dept: CT IMAGING | Facility: CLINIC | Age: 59
End: 2024-12-13
Attending: INTERNAL MEDICINE
Payer: COMMERCIAL

## 2024-12-13 DIAGNOSIS — Z87.891 PERSONAL HISTORY OF TOBACCO USE: ICD-10-CM

## 2024-12-13 PROCEDURE — 71271 CT THORAX LUNG CANCER SCR C-: CPT

## 2024-12-16 NOTE — TELEPHONE ENCOUNTER
Please see 12-10-24 message.     Manuela Freeman RN  [facility] Triage

## 2024-12-23 NOTE — PROGRESS NOTES
No inducible VT nor SVT. 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 SVT may be cause of syncope. Given normal LV function and no evidence of CAD no need for loop recorder at this point in time. Home after 4 hours of bedrest.   Orders Placed This Encounter    LORazepam (ATIVAN) 0.5 MG tablet

## 2025-01-13 ENCOUNTER — VIRTUAL VISIT (OUTPATIENT)
Dept: INTERNAL MEDICINE | Facility: CLINIC | Age: 60
End: 2025-01-13
Attending: INTERNAL MEDICINE
Payer: COMMERCIAL

## 2025-01-13 DIAGNOSIS — J20.9 ACUTE BRONCHITIS WITH COEXISTING CONDITION REQUIRING PROPHYLACTIC TREATMENT: ICD-10-CM

## 2025-01-13 DIAGNOSIS — E11.9 TYPE 2 DIABETES MELLITUS WITHOUT COMPLICATION, WITHOUT LONG-TERM CURRENT USE OF INSULIN (H): Primary | ICD-10-CM

## 2025-01-13 PROCEDURE — 98005 SYNCH AUDIO-VIDEO EST LOW 20: CPT | Performed by: INTERNAL MEDICINE

## 2025-01-13 RX ORDER — ALBUTEROL SULFATE 90 UG/1
2 INHALANT RESPIRATORY (INHALATION) EVERY 6 HOURS PRN
Qty: 18 G | Refills: 1 | Status: CANCELLED | OUTPATIENT
Start: 2025-01-13

## 2025-01-13 NOTE — PROGRESS NOTES
Lance is a 59 year old who is being evaluated via a billable video visit.    How would you like to obtain your AVS? [employer]  If the video visit is dropped, the invitation should be resent by: Text to cell phone: [phone]  Will anyone else be joining your video visit? No      Assessment & Plan     Type 2 diabetes mellitus without complication, without long-term current use of insulin (H)  Semaglutide going well thus far, he is up to 0.5 mg weekly.  Continue at current dose, contact me via [employer] in 4 weeks, at which point we will increase further to 1.0 mg weekly if still tolerated well.    Acute bronchitis with coexisting condition requiring prophylactic treatment                  Subjective   Lance is a 59 year old, presenting for the following health issues:  RECHECK and Recheck Medication      1/13/2025    12:48 PM   Additional Questions   Roomed by giovanni stokes    He is tolerating medication well thus far, without any side effects.  Has lost approximately 6 pounds.      Review of Systems  Constitutional, HEENT, cardiovascular, pulmonary, gi and gu systems are negative, except as otherwise noted.      Objective           Vitals:  No vitals were obtained today due to virtual visit.    Physical Exam   GENERAL: alert and no distress  EYES: Eyes grossly normal to inspection.  No discharge or erythema, or obvious scleral/conjunctival abnormalities.  RESP: No audible wheeze, cough, or visible cyanosis.    SKIN: Visible skin clear. No significant rash, abnormal pigmentation or lesions.  NEURO: Cranial nerves grossly intact.  Mentation and speech appropriate for age.  PSYCH: Appropriate affect, tone, and pace of words          Video-Visit Details    Type of service:  Video Visit   Originating Location (pt. Location): Home    Distant Location (provider location):  On-site  Platform used for Video Visit: Erik  Signed Electronically by: Chato Mcelroy MD

## 2025-02-04 ENCOUNTER — MYC MEDICAL ADVICE (OUTPATIENT)
Dept: INTERNAL MEDICINE | Facility: CLINIC | Age: 60
End: 2025-02-04
Payer: COMMERCIAL

## 2025-02-04 DIAGNOSIS — E11.9 TYPE 2 DIABETES MELLITUS WITHOUT COMPLICATION, WITHOUT LONG-TERM CURRENT USE OF INSULIN (H): Primary | ICD-10-CM

## 2025-03-03 ENCOUNTER — MYC MEDICAL ADVICE (OUTPATIENT)
Dept: INTERNAL MEDICINE | Facility: CLINIC | Age: 60
End: 2025-03-03
Payer: COMMERCIAL

## 2025-03-03 DIAGNOSIS — E11.9 TYPE 2 DIABETES MELLITUS WITHOUT COMPLICATION, WITHOUT LONG-TERM CURRENT USE OF INSULIN (H): Primary | ICD-10-CM

## 2025-04-02 ENCOUNTER — LAB (OUTPATIENT)
Dept: LAB | Facility: CLINIC | Age: 60
End: 2025-04-02
Attending: INTERNAL MEDICINE
Payer: COMMERCIAL

## 2025-04-02 DIAGNOSIS — E78.5 HYPERLIPIDEMIA LDL GOAL <100: Primary | ICD-10-CM

## 2025-04-02 DIAGNOSIS — E11.9 TYPE 2 DIABETES MELLITUS WITHOUT COMPLICATION, WITHOUT LONG-TERM CURRENT USE OF INSULIN (H): ICD-10-CM

## 2025-04-02 DIAGNOSIS — Z12.5 SCREENING FOR PROSTATE CANCER: ICD-10-CM

## 2025-04-02 LAB
ALBUMIN SERPL BCG-MCNC: 4.3 G/DL (ref 3.5–5.2)
ALP SERPL-CCNC: 61 U/L (ref 40–150)
ALT SERPL W P-5'-P-CCNC: 25 U/L (ref 0–70)
ANION GAP SERPL CALCULATED.3IONS-SCNC: 11 MMOL/L (ref 7–15)
AST SERPL W P-5'-P-CCNC: 26 U/L (ref 0–45)
BILIRUB SERPL-MCNC: 0.5 MG/DL
BUN SERPL-MCNC: 10.3 MG/DL (ref 8–23)
CALCIUM SERPL-MCNC: 9.5 MG/DL (ref 8.8–10.4)
CHLORIDE SERPL-SCNC: 98 MMOL/L (ref 98–107)
CHOLEST SERPL-MCNC: 127 MG/DL
CREAT SERPL-MCNC: 0.96 MG/DL (ref 0.67–1.17)
CREAT UR-MCNC: 107 MG/DL
EGFRCR SERPLBLD CKD-EPI 2021: 90 ML/MIN/1.73M2
EST. AVERAGE GLUCOSE BLD GHB EST-MCNC: 123 MG/DL
FASTING STATUS PATIENT QL REPORTED: YES
FASTING STATUS PATIENT QL REPORTED: YES
GLUCOSE SERPL-MCNC: 100 MG/DL (ref 70–99)
HBA1C MFR BLD: 5.9 % (ref 0–5.6)
HCO3 SERPL-SCNC: 24 MMOL/L (ref 22–29)
HDLC SERPL-MCNC: 38 MG/DL
LDLC SERPL CALC-MCNC: 66 MG/DL
MICROALBUMIN UR-MCNC: <12 MG/L
MICROALBUMIN/CREAT UR: NORMAL MG/G{CREAT}
NONHDLC SERPL-MCNC: 89 MG/DL
POTASSIUM SERPL-SCNC: 4.4 MMOL/L (ref 3.4–5.3)
PROT SERPL-MCNC: 6.9 G/DL (ref 6.4–8.3)
PSA SERPL DL<=0.01 NG/ML-MCNC: 0.79 NG/ML (ref 0–4.5)
SODIUM SERPL-SCNC: 133 MMOL/L (ref 135–145)
TRIGL SERPL-MCNC: 117 MG/DL

## 2025-04-02 PROCEDURE — 82043 UR ALBUMIN QUANTITATIVE: CPT

## 2025-04-02 PROCEDURE — 36415 COLL VENOUS BLD VENIPUNCTURE: CPT

## 2025-04-02 PROCEDURE — G0103 PSA SCREENING: HCPCS

## 2025-04-02 PROCEDURE — 83036 HEMOGLOBIN GLYCOSYLATED A1C: CPT

## 2025-04-02 PROCEDURE — 80061 LIPID PANEL: CPT

## 2025-04-02 PROCEDURE — 82570 ASSAY OF URINE CREATININE: CPT

## 2025-04-02 PROCEDURE — 80053 COMPREHEN METABOLIC PANEL: CPT

## 2025-04-04 ENCOUNTER — NURSE TRIAGE (OUTPATIENT)
Dept: INTERNAL MEDICINE | Facility: CLINIC | Age: 60
End: 2025-04-04

## 2025-04-07 NOTE — TELEPHONE ENCOUNTER
"S-(situation): please see indrahart. Pt reporting intermittent acid reflux symptoms worsening for the past few weeks. Pt has been waking from sleep due to burning sensation in esophagus and upper abdomen. Requesting rx. Pt has never been dx with heartburn but has had symptom for several years.     B-(background): pt reporting \"burning\" from reflux has been waking him from sleep on and off for a few weeks. Pt has to get out of bed and take prilosec for symptom relief.     A-(assessment): MD please review and advise. Pt triaged and dispo given to see pt in office within two weeks. Pt wondering if provider ok to send order as requested or should he schedule an appointment/e-visit?    R-(recommendations): discussed prilosec information with pt. Pt verbalizes understanding and will wait to hear from provider regarding plan of care.       1. LOCATION: \"Where does it hurt?\"       Burning in esophagus and upper abdomen - prilosec helps   2. RADIATION: \"Does the pain shoot anywhere else?\" (e.g., chest, back)      Denies   3. ONSET: \"When did the pain begin?\" (Minutes, hours or days ago)       Few months ago   4. SUDDEN: \"Gradual or sudden onset?\"      Gradual, few years, but worsening over the past few weeks. Pt will now sometimes wake up for GI discomfort.   5. PATTERN \"Does the pain come and go, or is it constant?\"      Comes and goes   6. SEVERITY: \"How bad is the pain?\"  (e.g., Scale 1-10; mild, moderate, or severe)      Annoying  7. RECURRENT SYMPTOM: \"Have you ever had this type of stomach pain before?\" If Yes, ask: \"When was the last time?\" and \"What happened that time?\"       Tums worked in past, but not now. Prilosec helping somewhat now, but pt is waking up overnight   8. CAUSE: \"What do you think is causing the stomach pain?\"      Heart burn   9. RELIEVING/AGGRAVATING FACTORS: \"What makes it better or worse?\" (e.g., antacids, bending or twisting motion, bowel movement)      Antacids provide partial relief. Worse " "when pt is laying flat   10. OTHER SYMPTOMS: \"Do you have any other symptoms?\" (e.g., back pain, diarrhea, fever, urination pain, vomiting)        Denies    Can we leave a detailed message on this number? YES  Phone number patient can be reached at: Home number on file [phone] (home)    Andreia Florez RN  [facility] Triage      Reason for Disposition   Abdominal pain is a chronic symptom (recurrent or ongoing AND lasting > 4 weeks)    Additional Information   Negative: Passed out (e.g., fainted, lost consciousness, blacked out and was not responding)   Negative: Shock suspected (e.g., cold/pale/clammy skin, too weak to stand, low BP, rapid pulse)   Negative: Sounds like a life-threatening emergency to the triager   Negative: Followed an abdomen (stomach) injury   Negative: Chest pain   Negative: Pain is mainly in upper abdomen (if needed ask: 'is it mainly above the belly button?')   Negative: Abdomen bloating or swelling are main symptoms   Negative: SEVERE abdominal pain (e.g., excruciating)   Negative: Vomiting red blood or black (coffee ground) material   Negative: Blood in bowel movements  (Exception: Blood on surface of BM with constipation.)   Negative: Black or tarry bowel movements  (Exception: Chronic-unchanged black-grey BMs AND is taking iron pills or Pepto-Bismol.)   Negative: Unable to urinate (or only a few drops) and bladder feels very full   Negative: Pain in scrotum persists > 1 hour   Negative: MILD TO MODERATE constant pain lasting > 2 hours   Negative: MILD TO MODERATE constant pain lasting > 2 hours, and age > 60 years   Negative: Vomiting bile (green color)   Negative: Patient sounds very sick or weak to the triager   Negative: Vomiting and abdomen looks much more swollen than usual   Negative: White of the eyes have turned yellow (i.e., jaundice)   Negative: Blood in urine (red, pink, or tea-colored)   Negative: Fever > 103 F (39.4 C)   Negative: Fever > 101 F (38.3 C) and " over 60 years of age   Negative: Fever > 100 F (37.8 C) and has diabetes mellitus or a weak immune system (e.g., HIV positive, cancer chemotherapy, organ transplant, splenectomy, chronic steroids)   Negative: Fever > 100 F (37.8 C) and bedridden (e.g., CVA, chronic illness, recovering from surgery)   Negative: MODERATE pain (e.g., interferes with normal activities) that comes and goes (cramps) lasts > 24 hours  (Exception: Pain with Vomiting or Diarrhea - see that Protocol.)   Negative: Patient wants to be seen   Negative: MILD pain (e.g., does not interfere with normal activities) and pain comes and goes (cramps) lasts > 48 hours  (Exception: This same abdominal pain is a chronic symptom recurrent or ongoing AND present > 4 weeks.)    Protocols used: Abdominal Pain - Male-A-[place]

## 2025-08-25 ENCOUNTER — OFFICE VISIT (OUTPATIENT)
Dept: INTERNAL MEDICINE | Facility: CLINIC | Age: 60
End: 2025-08-25
Payer: COMMERCIAL

## 2025-08-25 VITALS
DIASTOLIC BLOOD PRESSURE: 76 MMHG | HEIGHT: 72 IN | OXYGEN SATURATION: 98 % | TEMPERATURE: 98.4 F | RESPIRATION RATE: 18 BRPM | HEART RATE: 52 BPM | BODY MASS INDEX: 30.91 KG/M2 | WEIGHT: 228.2 LBS | SYSTOLIC BLOOD PRESSURE: 130 MMHG

## 2025-08-25 DIAGNOSIS — E11.9 TYPE 2 DIABETES MELLITUS WITHOUT COMPLICATION, WITHOUT LONG-TERM CURRENT USE OF INSULIN (H): ICD-10-CM

## 2025-08-25 DIAGNOSIS — Z01.818 PREOP GENERAL PHYSICAL EXAM: Primary | ICD-10-CM

## 2025-08-25 LAB
ANION GAP SERPL CALCULATED.3IONS-SCNC: 12 MMOL/L (ref 7–15)
BUN SERPL-MCNC: 13.5 MG/DL (ref 8–23)
CALCIUM SERPL-MCNC: 10.5 MG/DL (ref 8.8–10.4)
CHLORIDE SERPL-SCNC: 100 MMOL/L (ref 98–107)
CREAT SERPL-MCNC: 1.14 MG/DL (ref 0.67–1.17)
EGFRCR SERPLBLD CKD-EPI 2021: 73 ML/MIN/1.73M2
EST. AVERAGE GLUCOSE BLD GHB EST-MCNC: 114 MG/DL
GLUCOSE SERPL-MCNC: 105 MG/DL (ref 70–99)
HBA1C MFR BLD: 5.6 % (ref 0–5.6)
HCO3 SERPL-SCNC: 25 MMOL/L (ref 22–29)
HGB BLD-MCNC: 15.3 G/DL (ref 13.3–17.7)
MCV RBC AUTO: 85.5 FL (ref 78–100)
POTASSIUM SERPL-SCNC: 4 MMOL/L (ref 3.4–5.3)
SODIUM SERPL-SCNC: 137 MMOL/L (ref 135–145)

## 2025-08-25 PROCEDURE — 99214 OFFICE O/P EST MOD 30 MIN: CPT | Performed by: INTERNAL MEDICINE

## 2025-08-25 PROCEDURE — 80048 BASIC METABOLIC PNL TOTAL CA: CPT | Performed by: INTERNAL MEDICINE

## 2025-08-25 PROCEDURE — 36415 COLL VENOUS BLD VENIPUNCTURE: CPT | Performed by: INTERNAL MEDICINE

## 2025-08-25 PROCEDURE — 3044F HG A1C LEVEL LT 7.0%: CPT | Performed by: INTERNAL MEDICINE

## 2025-08-25 PROCEDURE — 83036 HEMOGLOBIN GLYCOSYLATED A1C: CPT | Performed by: INTERNAL MEDICINE

## 2025-08-25 PROCEDURE — 85018 HEMOGLOBIN: CPT | Performed by: INTERNAL MEDICINE

## 2025-08-25 PROCEDURE — 93000 ELECTROCARDIOGRAM COMPLETE: CPT | Performed by: INTERNAL MEDICINE

## 2025-08-25 PROCEDURE — 3075F SYST BP GE 130 - 139MM HG: CPT | Performed by: INTERNAL MEDICINE

## 2025-08-25 PROCEDURE — 3078F DIAST BP <80 MM HG: CPT | Performed by: INTERNAL MEDICINE

## (undated) DEVICE — SU VICRYL 3-0 SH 27" J316H

## (undated) DEVICE — DRAPE LAP W/ARMBOARD 29410

## (undated) DEVICE — PREP CHLORAPREP 26ML TINTED ORANGE  260815

## (undated) DEVICE — SU VICRYL 4-0 PS-2 18" UND J496H

## (undated) DEVICE — ESU ELEC BLADE NN-STCK PLUMEPEN PRO 360D 10FT PLPRO4020

## (undated) DEVICE — ESU ELEC BLADE 2.75" COATED/INSULATED E1455

## (undated) DEVICE — PACK MINOR SBA15MIFSE

## (undated) DEVICE — GOWN IMPERVIOUS SPECIALTY XLG/XLONG 32474

## (undated) DEVICE — DRSG STERI STRIP 1/2X4" R1547

## (undated) DEVICE — SU VICRYL 0 UR-6 27" J603H

## (undated) DEVICE — SOL WATER IRRIG 1000ML BOTTLE 2F7114

## (undated) DEVICE — LINEN TOWEL PACK X5 5464

## (undated) DEVICE — SYR 10ML LL W/O NDL 302995

## (undated) DEVICE — GLOVE PROTEXIS W/NEU-THERA 8.0  2D73TE80

## (undated) RX ORDER — LIDOCAINE HYDROCHLORIDE 10 MG/ML
INJECTION, SOLUTION EPIDURAL; INFILTRATION; INTRACAUDAL; PERINEURAL
Status: DISPENSED
Start: 2017-09-25

## (undated) RX ORDER — PROPOFOL 10 MG/ML
INJECTION, EMULSION INTRAVENOUS
Status: DISPENSED
Start: 2022-04-13

## (undated) RX ORDER — HYDROMORPHONE HCL IN WATER/PF 6 MG/30 ML
PATIENT CONTROLLED ANALGESIA SYRINGE INTRAVENOUS
Status: DISPENSED
Start: 2022-04-13

## (undated) RX ORDER — HEPARIN SODIUM 1000 [USP'U]/ML
INJECTION, SOLUTION INTRAVENOUS; SUBCUTANEOUS
Status: DISPENSED
Start: 2017-09-25

## (undated) RX ORDER — BUPIVACAINE HYDROCHLORIDE 2.5 MG/ML
INJECTION, SOLUTION EPIDURAL; INFILTRATION; INTRACAUDAL
Status: DISPENSED
Start: 2017-09-25

## (undated) RX ORDER — HYDROCODONE BITARTRATE AND ACETAMINOPHEN 5; 325 MG/1; MG/1
TABLET ORAL
Status: DISPENSED
Start: 2022-04-13

## (undated) RX ORDER — FENTANYL CITRATE 50 UG/ML
INJECTION, SOLUTION INTRAMUSCULAR; INTRAVENOUS
Status: DISPENSED
Start: 2022-04-13

## (undated) RX ORDER — CEFAZOLIN SODIUM/WATER 2 G/20 ML
SYRINGE (ML) INTRAVENOUS
Status: DISPENSED
Start: 2022-04-13

## (undated) RX ORDER — REGADENOSON 0.08 MG/ML
INJECTION, SOLUTION INTRAVENOUS
Status: DISPENSED
Start: 2017-08-28

## (undated) RX ORDER — FENTANYL CITRATE 0.05 MG/ML
INJECTION, SOLUTION INTRAMUSCULAR; INTRAVENOUS
Status: DISPENSED
Start: 2022-04-13

## (undated) RX ORDER — FENTANYL CITRATE 50 UG/ML
INJECTION, SOLUTION INTRAMUSCULAR; INTRAVENOUS
Status: DISPENSED
Start: 2017-09-25

## (undated) RX ORDER — AMINOPHYLLINE 25 MG/ML
INJECTION, SOLUTION INTRAVENOUS
Status: DISPENSED
Start: 2017-08-28